# Patient Record
Sex: FEMALE | Race: WHITE | NOT HISPANIC OR LATINO | Employment: PART TIME | URBAN - METROPOLITAN AREA
[De-identification: names, ages, dates, MRNs, and addresses within clinical notes are randomized per-mention and may not be internally consistent; named-entity substitution may affect disease eponyms.]

---

## 2017-03-30 ENCOUNTER — GENERIC CONVERSION - ENCOUNTER (OUTPATIENT)
Dept: OTHER | Facility: OTHER | Age: 52
End: 2017-03-30

## 2017-08-15 ENCOUNTER — GENERIC CONVERSION - ENCOUNTER (OUTPATIENT)
Dept: OTHER | Facility: OTHER | Age: 52
End: 2017-08-15

## 2017-08-23 ENCOUNTER — ALLSCRIPTS OFFICE VISIT (OUTPATIENT)
Dept: OTHER | Facility: OTHER | Age: 52
End: 2017-08-23

## 2017-08-23 DIAGNOSIS — Z12.31 ENCOUNTER FOR SCREENING MAMMOGRAM FOR MALIGNANT NEOPLASM OF BREAST: ICD-10-CM

## 2017-08-23 DIAGNOSIS — R79.89 OTHER SPECIFIED ABNORMAL FINDINGS OF BLOOD CHEMISTRY: ICD-10-CM

## 2017-08-23 DIAGNOSIS — R32 URINARY INCONTINENCE: ICD-10-CM

## 2017-08-23 DIAGNOSIS — E03.9 HYPOTHYROIDISM: ICD-10-CM

## 2017-08-23 DIAGNOSIS — Z13.6 ENCOUNTER FOR SCREENING FOR CARDIOVASCULAR DISORDERS: ICD-10-CM

## 2017-09-05 ENCOUNTER — ALLSCRIPTS OFFICE VISIT (OUTPATIENT)
Dept: OTHER | Facility: OTHER | Age: 52
End: 2017-09-05

## 2017-09-05 PROCEDURE — G0145 SCR C/V CYTO,THINLAYER,RESCR: HCPCS | Performed by: NURSE PRACTITIONER

## 2017-09-05 PROCEDURE — 87624 HPV HI-RISK TYP POOLED RSLT: CPT | Performed by: NURSE PRACTITIONER

## 2017-09-06 ENCOUNTER — LAB REQUISITION (OUTPATIENT)
Dept: LAB | Facility: HOSPITAL | Age: 52
End: 2017-09-06
Payer: COMMERCIAL

## 2017-09-06 DIAGNOSIS — Z01.419 ENCOUNTER FOR GYNECOLOGICAL EXAMINATION WITHOUT ABNORMAL FINDING: ICD-10-CM

## 2017-09-08 LAB — HPV RRNA GENITAL QL NAA+PROBE: NORMAL

## 2017-09-12 LAB
LAB AP GYN PRIMARY INTERPRETATION: NORMAL
Lab: NORMAL

## 2017-09-13 ENCOUNTER — GENERIC CONVERSION - ENCOUNTER (OUTPATIENT)
Dept: OTHER | Facility: OTHER | Age: 52
End: 2017-09-13

## 2017-09-13 ENCOUNTER — LAB CONVERSION - ENCOUNTER (OUTPATIENT)
Dept: OTHER | Facility: OTHER | Age: 52
End: 2017-09-13

## 2017-09-13 LAB
25(OH)D3 SERPL-MCNC: 34 NG/ML (ref 30–100)
A/G RATIO (HISTORICAL): 1.7 (CALC) (ref 1–2.5)
ALBUMIN SERPL BCP-MCNC: 4.5 G/DL (ref 3.6–5.1)
ALP SERPL-CCNC: 116 U/L (ref 33–130)
ALT SERPL W P-5'-P-CCNC: 22 U/L (ref 6–29)
AST SERPL W P-5'-P-CCNC: 22 U/L (ref 10–35)
BILIRUB SERPL-MCNC: 0.6 MG/DL (ref 0.2–1.2)
BUN SERPL-MCNC: 14 MG/DL (ref 7–25)
BUN/CREA RATIO (HISTORICAL): NORMAL (CALC) (ref 6–22)
CALCIUM SERPL-MCNC: 10.2 MG/DL (ref 8.6–10.4)
CHLORIDE SERPL-SCNC: 103 MMOL/L (ref 98–110)
CO2 SERPL-SCNC: 31 MMOL/L (ref 20–31)
CREAT SERPL-MCNC: 0.76 MG/DL (ref 0.5–1.05)
EGFR AFRICAN AMERICAN (HISTORICAL): 105 ML/MIN/1.73M2
EGFR-AMERICAN CALC (HISTORICAL): 91 ML/MIN/1.73M2
GAMMA GLOBULIN (HISTORICAL): 2.7 G/DL (CALC) (ref 1.9–3.7)
GLUCOSE (HISTORICAL): 90 MG/DL (ref 65–99)
POTASSIUM SERPL-SCNC: 4.1 MMOL/L (ref 3.5–5.3)
SODIUM SERPL-SCNC: 139 MMOL/L (ref 135–146)
TOTAL PROTEIN (HISTORICAL): 7.2 G/DL (ref 6.1–8.1)

## 2017-09-14 ENCOUNTER — GENERIC CONVERSION - ENCOUNTER (OUTPATIENT)
Dept: OTHER | Facility: OTHER | Age: 52
End: 2017-09-14

## 2018-01-12 NOTE — RESULT NOTES
Discussion/Summary   Normal labs     Verified Results  (1) COMPREHENSIVE METABOLIC PANEL [id number] 91:04YY Jaime Brandon     Test Name Result Flag Reference   GLUCOSE 90 mg/dL  65-99   Fasting reference interval   UREA NITROGEN (BUN) 14 mg/dL  7-25   CREATININE 0 76 mg/dL  0 50-1 05   For patients >52years of age, the reference limit  for Creatinine is approximately 13% higher for people  identified as -American  eGFR NON-AFR  AMERICAN 91 mL/min/1 73m2  > OR = 60   eGFR AFRICAN AMERICAN 105 mL/min/1 73m2  > OR = 60   BUN/CREATININE RATIO   9-04   NOT APPLICABLE (calc)   SODIUM 139 mmol/L  135-146   POTASSIUM 4 1 mmol/L  3 5-5 3   CHLORIDE 103 mmol/L     CARBON DIOXIDE 31 mmol/L  20-31   CALCIUM 10 2 mg/dL  8 6-10 4   PROTEIN, TOTAL 7 2 g/dL  6 1-8 1   ALBUMIN 4 5 g/dL  3 6-5 1   GLOBULIN 2 7 g/dL (calc)  1 9-3 7   ALBUMIN/GLOBULIN RATIO 1 7 (calc)  1 0-2 5   BILIRUBIN, TOTAL 0 6 mg/dL  0 2-1 2   ALKALINE PHOSPHATASE 116 U/L     AST 22 U/L  10-35   ALT 22 U/L  6-29     (Q) CBC (H/H, RBC, INDICES, WBC, PLT) 12Sep2017 08:44AM Jade Everett     Test Name Result Flag Reference   WHITE BLOOD CELL COUNT 6 2 Thousand/uL  3 8-10 8   RED BLOOD CELL COUNT 4 53 Million/uL  3 80-5 10   HEMOGLOBIN 13 9 g/dL  11 7-15 5   HEMATOCRIT 42 0 %  35 0-45 0   MCV 92 7 fL  80 0-100 0   MCH 30 7 pg  27 0-33 0   MCHC 33 1 g/dL  32 0-36 0   RDW 12 5 %  11 0-15 0   PLATELET COUNT 098 Thousand/uL  140-400   WE RECEIVED YOUR HANDWRITTEN TEST ORDER AND  PERFORMED A HEMOGRAM WITH A PLATELET WITHOUT  A DIFFERENTIAL  IF THIS IS NOT WHAT YOU INTENDED  TO ORDER, PLEASE CONTACT YOUR LOCAL CLIENT SERVICE  REPRESENTATIVE IMMEDIATELY SO THAT WE CAN   ADJUST OUR BILLING APPROPRIATELY  YOU MAY ALSO   INQUIRE ABOUT ALTERNATIVE OR ADDITIONAL TESTING     MPV 10 2 fL  7 5-12 5           *(Q) VITAMIN D, 25-HYDROXY, LC/MS/MS 12Sep2017 08:44AM Jade Everett   REPORT COMMENT:  FASTING:YES     Test Name Result Flag Reference   VITAMIN D, 25-OH, TOTAL 34 ng/mL     Vitamin D Status         25-OH Vitamin D:     Deficiency:                    <20 ng/mL  Insufficiency:             20 - 29 ng/mL  Optimal:                 > or = 30 ng/mL     For 25-OH Vitamin D testing on patients on   D2-supplementation and patients for whom quantitation   of D2 and D3 fractions is required, the QuestAssureD(TM)  25-OH VIT D, (D2,D3), LC/MS/MS is recommended: order   code 61385 (patients >2yrs)  For more information on this test, go to:  http://education  [employer]/faq/YTY545  (This link is being provided for   informational/educational purposes only )

## 2018-01-13 VITALS
HEIGHT: 65 IN | OXYGEN SATURATION: 96 % | HEART RATE: 75 BPM | DIASTOLIC BLOOD PRESSURE: 74 MMHG | BODY MASS INDEX: 23.82 KG/M2 | WEIGHT: 143 LBS | SYSTOLIC BLOOD PRESSURE: 122 MMHG

## 2018-01-15 NOTE — RESULT NOTES
Verified Results  (1) THIN PREP PAP WITH IMAGING [id number] 11:32AM Mor Schofield     Test Name Result Flag Reference   LAB AP CASE REPORT (Report)     Gynecologic Cytology Report            Case: [id number]                  Authorizing Provider: UMM Payne   Collected:      09/05/2017           First Screen:     RANDI Infante    Received:      09/07/2017 6102        Specimen:  LIQUID-BASED PAP, SCREENING, Cervix   HPV HIGH RISK RESULT (Report)     HPV, High Risk: HPV NEG, HPV16 NEG, HPV18 NEG      Other High Risk HPV Negative, HPV 16 Negative, HPV 18 Negative  HPV types: 16,18,31,33,35,39,45,51,52,56,58,59,66 and 68 DNA are undetectable or below the pre-set threshold  [employer]???s FDA approved Leesa 4800 is utilized with strict adherence to the ???s instruction  manual to test for the presence of High-Risk HPV DNA, as well as HPV 16 and HPV 18  This instrument  has been validated by our laboratory and/or by the   A negative result does not preclude the presence of HPV infection because results depend on adequate  specimen collection, absence of inhibitors and sufficient DNA to be detected  Additionally, HPV negative  results are not intended to prevent women from proceeding to colposcopy if clinically warranted  Positive HPV test results indicate the presence of any one or more of the high risk types, but since patients  are often co-infected with low-risk types it does not rule out the presence of low-risk types in patients  with mixed infections  LAB AP GYN PRIMARY INTERPRETATION      Negative for intraepithelial lesion or malignancy  Electronically signed by RANDI Infante on 9/12/2017 at 11:32 AM   LAB AP GYN SPECIMEN ADEQUACY      Satisfactory for evaluation  Endocervical/transformation zone component present     LAB AP GYN ADDITIONAL INFORMATION (Report)     [employer]'s FDA approved ,  and ThinPrep Imaging System are   utilized with strict adherence to the 's instruction manual to   prepare gynecologic and non-gynecologic cytology specimens for the   production of ThinPrep slides as well as for gynecologic ThinPrep imaging  These processes have been validated by our laboratory and/or by the     The Pap test is not a diagnostic procedure and should not be used as the   sole means to detect cervical cancer  It is only a screening procedure to   aid in the detection of cervical cancer and its precursors  Both   false-negative and false-positive results have been experienced  Your   patient's test result should be interpreted in this context together with   the history and clinical findings

## 2018-01-16 NOTE — PROGRESS NOTES
Assessment    1  Annual physical exam (V70 0) (Z00 00)   2  Screening for colon cancer (V76 51) (Z12 11)   3  Hypothyroidism (244 9) (E03 9)   4  Encounter for screening for cardiovascular disorders (V81 2) (Z13 6)   5  Low serum vitamin D (790 6) (R79 89)   6  History of allergic rhinitis (V12 69) (Z87 09)   7  Allergic rhinitis (477 9) (J30 9)   8  Right foot pain (729 5) (M79 671)    Plan  Allergic rhinitis    · Montelukast Sodium 10 MG Oral Tablet (Singulair); 1 Tab daily  Annual physical exam    · Begin a limited exercise program ; Status:Complete;   Done: 17AFU8295   · Drink plenty of fluids ; Status:Complete;   Done: 21LLT6355   · Eat a low fat and low cholesterol diet ; Status:Complete;   Done: 47ZUE4209   · Eat foods that are high in calcium ; Status:Complete;   Done: 10URW7500   · Stretch and warm up your muscles during the first 10 minutes , then cool down your  muscles for the last 10 minutes of exercise ; Status:Complete;   Done: 08VBI6754   · We recommend a colonoscopy ; Status:Complete;   Done: 03WFH7995   · We recommend routine visits to a dentist ; Status:Complete;   Done: 35OEC7195   · We recommend that you follow these steps to lower your risk of osteoporosis  ;  Status:Complete;   Done: 04LRD2770   · We recommend that you get 400 IU of Vitamin D each day ; Status:Complete;   Done:  91HMC4020  Encounter for screening for cardiovascular disorders    · (1) COMPREHENSIVE METABOLIC PANEL; Status:Active; Requested for:75Bsh8454;    · (1) LIPID PANEL, FASTING; Status:Canceled;   Encounter for screening for cardiovascular disorders, Hypothyroidism    · (1) CBC/ PLT (NO DIFF); Status:Active; Requested for:69Jsl3300;    · (1) TSH; Status:Canceled; Low serum vitamin D, Right foot pain    · (1) VITAMIN D 25-HYDROXY; Status:Active; Requested for:75Val6779;   Screening for colon cancer    · COLONOSCOPY; Status:Active;  Requested for:60Sgd8073;    · *1 -  [facility]  * Status: Active   Requested for: 87Zuo7060  Care Summary provided  : Yes    Discussion/Summary  health maintenance visit Currently, she eats a healthy diet  cervical cancer screening is current Breast cancer screening: mammogram is current  Colorectal cancer screening: the risks and benefits of colorectal cancer screening were discussed and colonoscopy has been ordered  Screening lab work includes hemoglobin, glucose and 25-hydroxyvitamin D  The immunizations are up to date  She was advised to be evaluated by [facility] ophthalmologist regularly  Advice and education were given regarding nutrition, aerobic exercise, calcium supplements, vitamin D supplements and sunscreen use  Patient discussion: discussed with the patient  She has allergies which are not well controlled with nasal spray and her nasal mucosa is inflamed  And she has watery eyes  I will start her on Singulair and continue nasal spray  She will have her labs, TSH will be done through endocrinologist and lipid through her insurance  Her labs reviewed from last year which are normal    The patient was counseled regarding instructions for management, risk factor reductions, impressions, importance of compliance with treatment  Chief Complaint  NEW PT ESTABLSIH      History of Present Illness  HM, Adult Female: The patient is being seen for a health maintenance evaluation  Social History: Work status: working part-time and occupation: Pediatric nurse  The patient has never smoked cigarettes  She reports never drinking alcohol  She has never used illicit drugs  General Health:   Screening:   HPI: New patient physical  She follows endocrinologist Dr Bashir Thomas, for hypothyroidism  She has been evaluated from cardiologist for PVCs  And had a normal stress test and does not need any treatment    She has seasonal allergies which are now constant all year round, she has tried over-the-counter Claritin and Allegra and now she is using nasal spray and she still has itchy watery eyes and nasal congestion  She is up-to-date on mammogram and Pap smear   she follows gynecologist   She gets her lipid panel and blood sugar through her 's insurance, and she get her TSH checked from endocrinologist, and they are stable  She complains of bilateral feet pain on and off and in the past she saw a orthopedic and was diagnosed vitamin D deficiency and was treated with high-dose of vitamin D,  Currently she takes multivitamin for age 48 and above  And still has some on and off bilateral feet pain      Review of Systems    Constitutional: No fever, no chills, feels well, no tiredness, no recent weight gain or weight loss  Eyes: No complaints of eye pain, no red eyes, no eyesight problems, no discharge, no dry eyes, no itching of eyes  ENT: as noted in HPI  Cardiovascular: No complaints of slow heart rate, no fast heart rate, no chest pain, no palpitations, no leg claudication, no lower extremity edema  Respiratory: No complaints of shortness of breath, no wheezing, no cough, no SOB on exertion, no orthopnea, no PND  Gastrointestinal: No complaints of abdominal pain, no constipation, no nausea or vomiting, no diarrhea, no bloody stools  Genitourinary: No complaints of dysuria, no incontinence, no pelvic pain, no dysmenorrhea, no vaginal discharge or bleeding  Musculoskeletal: No complaints of arthralgias, no myalgias, no joint swelling or stiffness, no limb pain or swelling  Integumentary: No complaints of skin rash or lesions, no itching, no skin wounds, no breast pain or lump  Neurological: No complaints of headache, no confusion, no convulsions, no numbness, no dizziness or fainting, no tingling, no limb weakness, no difficulty walking  Psychiatric: Not suicidal, no sleep disturbance, no anxiety or depression, no change in personality, no emotional problems     Endocrine: No complaints of proptosis, no hot flashes, no muscle weakness, no deepening of the voice, no feelings of weakness  Hematologic/Lymphatic: No complaints of swollen glands, no swollen glands in the neck, does not bleed easily, does not bruise easily  ROS reviewed  Active Problems    1  Acute upper respiratory infection (465 9) (J06 9)   2  Dysuria (788 1) (R30 0)   3  Flank pain (789 09) (R10 9)   4  Hypothyroidism (244 9) (E03 9)   5  Irregular heart beats (427 9) (I49 9)   6  Joint pain, knee (719 46) (M25 569)   7  Microscopic hematuria (599 72) (R31 29)   8  Nontoxic Nodular Goiter (241 9)   9  Palpitations (785 1) (R00 2)   10  Premature atrial complex (427 61) (I49 1)   11  Premature ventricular contraction (427 69) (I49 3)   12  Screening for breast cancer (V76 10) (Z12 31)   13  Shortness of breath (786 05) (R06 02)   14  Tear Of Lateral Cartilage Of The Left Knee (836 1)   15  Vaginal burning (625 8) (N94 9)   16  Vertigo (780 4) (R42)   17  Visit for routine gyn exam (V72 31) (Z01 419)   18   Vulvar irritation (624 8) (N90 89)    Past Medical History    · History of Cervical neuritis (723 4) (M54 12)   · History of allergic rhinitis (V12 69) (Z87 09)   · History of Hashimoto thyroiditis (V12 29) (Z86 39)   · History of Vitamin D deficiency (268 9) (E55 9)    Surgical History    · History of Arthrotomy Of Knee With Open Meniscus Repair   · History of  Section    Family History  Mother    · Family history of Hyperlipidemia   · Family history of Hypertension (V17 49)   · Family history of Hyperthyroidism  Father    · Family history of acute myocardial infarction (V17 3) (Z80 55)  Son    · Family history of heart block (V17 49) (Z82 49)  Family History    · Family history of Arthritis (V17 7)   · Family history of Osteoporosis (V17 81)    Social History    · Daily Cola Consumption (___ Cans/Day)   · Daily Tea Consumption (1  Cups/Day)   · Does not drink alcohol (V49 89) (Z78 9)   · Does not exercise (V69 0) (Z72 3)   · Educational Level - Completed Bachelors Degree   · Marital History - Currently    · 2 children   · Never A Smoker   · Not currently sexually active   · 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   · Two children    Current Meds   1  Flonase Allergy Relief 50 MCG/ACT Nasal Suspension; 2 Sprays in each nostril daily at   night; Therapy: 12Kau8989 to Recorded   2  Synthroid 112 MCG Oral Tablet; Therapy: 98GHO0207 to (Last Rx:10Jun2010)  Requested for: 30WVR5286 Ordered   3  Viactiv Multi-Vitamin CHEW;   Therapy: (Recorded:02Ipu8984) to Recorded    Allergies    1  No Known Drug Allergies    Vitals   Recorded: 23Aug2017 09:42AM Recorded: 23Aug2017 09:08AM   Heart Rate 70 103   Respiration  16   Systolic  333   Diastolic  72   Height  5 ft 5 in   Weight  143 lb    BMI Calculated  23 8   BSA Calculated  1 72     Physical Exam    Constitutional   General appearance: No acute distress, well appearing and well nourished  Head and Face   Head and face: Normal     Palpation of the face and sinuses: No sinus tenderness  Eyes   Conjunctiva and lids: No swelling, erythema or discharge  Pupils and irises: Equal, round, reactive to light  Ears, Nose, Mouth, and Throat   External inspection of ears and nose: Normal     Otoscopic examination: Tympanic membranes translucent with normal light reflex  Canals patent without erythema  Hearing: Normal     Nasal mucosa, septum, and turbinates: Abnormal   The bilateral nasal mucosa was red  Lips, teeth, and gums: Normal, good dentition  Oropharynx: Normal with no erythema, edema, exudate or lesions  Neck   Neck: Supple, symmetric, trachea midline, no masses  Thyroid: Normal, no thyromegaly  Pulmonary   Respiratory effort: No increased work of breathing or signs of respiratory distress  Percussion of chest: Normal     Auscultation of lungs: Clear to auscultation  Cardiovascular   Auscultation of heart: Normal rate and rhythm, normal S1 and S2, no murmurs  Abdomen   Abdomen: Non-tender, no masses      Liver and spleen: No hepatomegaly or splenomegaly  Lymphatic   Palpation of lymph nodes in neck: No lymphadenopathy  Musculoskeletal   Gait and station: Normal     Digits and nails: Normal without clubbing or cyanosis  Joints, bones, and muscles: Normal     Range of motion: Normal     Stability: Normal     Muscle strength/tone: Normal     Skin   Skin and subcutaneous tissue: Normal without rashes or lesions  Palpation of skin and subcutaneous tissue: Normal turgor  Neurologic   Cranial nerves: Cranial nerves II-XII intact  Sensation: No sensory loss  Psychiatric   Judgment and insight: Normal     Orientation to person, place, and time: Normal     Recent and remote memory: Intact  Mood and affect: Normal        Results/Data  PHQ-2 Adult Depression Screening 90Qhc6444 09:12AM User, Ahs     Test Name Result Flag Reference   PHQ-2 Adult Depression Score 0     Over the last two weeks, how often have you been bothered by any of the following problems?   Little interest or pleasure in doing things: Not at all - 0  Feeling down, depressed, or hopeless: Not at all - 0   PHQ-2 Adult Depression Screening Negative         Procedure    Procedure:   Results: 20/20 in the right eye with corrective device, 20/20 in the left eye with corrective device SEES DR STEEN      Future Appointments    Date/Time Provider Specialty Site   09/05/2017 10:00 AM Haim Pinto, [address] [facility]  [facility]   Electronically signed by : ARIS Narayan ; Aug 23 2017  9:50AM EST                       (Author)

## 2018-01-22 VITALS
RESPIRATION RATE: 16 BRPM | HEIGHT: 65 IN | HEART RATE: 70 BPM | WEIGHT: 143 LBS | SYSTOLIC BLOOD PRESSURE: 122 MMHG | BODY MASS INDEX: 23.82 KG/M2 | DIASTOLIC BLOOD PRESSURE: 72 MMHG

## 2018-07-31 ENCOUNTER — OFFICE VISIT (OUTPATIENT)
Dept: OBGYN CLINIC | Facility: CLINIC | Age: 53
End: 2018-07-31
Payer: COMMERCIAL

## 2018-07-31 ENCOUNTER — APPOINTMENT (OUTPATIENT)
Dept: RADIOLOGY | Facility: CLINIC | Age: 53
End: 2018-07-31
Payer: COMMERCIAL

## 2018-07-31 VITALS
WEIGHT: 144 LBS | DIASTOLIC BLOOD PRESSURE: 67 MMHG | SYSTOLIC BLOOD PRESSURE: 127 MMHG | BODY MASS INDEX: 23.14 KG/M2 | HEART RATE: 76 BPM | HEIGHT: 66 IN

## 2018-07-31 DIAGNOSIS — M71.21 BAKER'S CYST OF KNEE, RIGHT: ICD-10-CM

## 2018-07-31 DIAGNOSIS — M17.11 PRIMARY LOCALIZED OSTEOARTHRITIS OF RIGHT KNEE: ICD-10-CM

## 2018-07-31 DIAGNOSIS — M25.561 RIGHT KNEE PAIN, UNSPECIFIED CHRONICITY: ICD-10-CM

## 2018-07-31 DIAGNOSIS — M25.561 RIGHT KNEE PAIN, UNSPECIFIED CHRONICITY: Primary | ICD-10-CM

## 2018-07-31 PROCEDURE — 73562 X-RAY EXAM OF KNEE 3: CPT

## 2018-07-31 PROCEDURE — 99204 OFFICE O/P NEW MOD 45 MIN: CPT | Performed by: ORTHOPAEDIC SURGERY

## 2018-07-31 RX ORDER — LEVOTHYROXINE SODIUM 88 MCG
TABLET ORAL
COMMUNITY
Start: 2018-07-27 | End: 2019-10-03

## 2018-07-31 RX ORDER — MELOXICAM 7.5 MG/1
7.5 TABLET ORAL DAILY
Qty: 30 TABLET | Refills: 1 | Status: SHIPPED | OUTPATIENT
Start: 2018-07-31 | End: 2018-10-04 | Stop reason: ALTCHOICE

## 2018-07-31 NOTE — PROGRESS NOTES
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 for a complaint of right knee fullness and stiffness  After thorough history, clinical exam, review of her x-rays I feel that she has localized patellofemoral osteoarthritis that is mild in her right knee  We discussed use of a daily anti-inflammatory as currently she is taking 3-4 doses of Motrin a day and this has helped her swelling since she noticed its onset  Today she does not have an effusion but she does have a small Baker cyst as well  We discussed the relationship between patellofemoral osteoarthritis and a Baker cyst   She does have some calf sharpness that is likely due to the radiation of pain from the Baker cyst as it is not associated with activity and does not raise clinical concern for a DVT at this point  She has no identifiable risk factors for DVT  To address her knee issues I recommended that she start taking a daily anti-inflammatory for Mobic, use a lateral J brace and modify her activities  If these are ineffective at relieving her discomfort we may consider adding physical therapy for her knee  All of her questions were addressed  Like to see her back in approximately 6 weeks time we will further delineate her plan of care from there  The patient may call the office at anytime if any questions or concerns should arise  Subjective:  Right knee pain    Patient ID: Fermín Valentine is a 47 y o  female  HPI  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 of approximately 1 weeks worth of anterior knee discomfort and tightness    She states that she got home from vacation in [place] last week and a few days towards the end of her vacation and after she got home she noticed a increase in right knee swelling and stiffness  She also had an intermittent sharp calf pain that could not be related to activity that was present after her return home  She has this calf pain that is intermittent as well as a fullness behind the knee  She states the pain can be 8/10 on the pain scale and is not affected by activity or rest   She states the frequency of this discomfort has been decreasing since she has got home  Her other complaint is mostly in her knee and she states that it has been swollen over the last few days and has been feeling tight  She states today is least that is been swollen and she is actually feeling slightly better  She denies any mechanical symptoms  She has been taking Motrin approximately 3 to 4 times a day to help with the swelling in her right knee  She did have a similar incident before in her left knee which underwent meniscectomy and a Baker cyst was also present during that time  This was managed by Dr Kong Chand 5 years ago  Review of Systems   Constitutional: Positive for activity change  HENT: Negative  Eyes: Negative  Respiratory: Negative  Cardiovascular: Negative  Gastrointestinal: Negative  Endocrine: Negative  Musculoskeletal: Positive for arthralgias  Skin: Negative  Neurological: Negative  Psychiatric/Behavioral: Negative  Past Medical History:   Diagnosis Date    Disease of thyroid gland     Primary localized osteoarthritis of right knee 2018       Past Surgical History:   Procedure Laterality Date     SECTION      KNEE SURGERY         Family History   Problem Relation Age of Onset    Hypertension Mother     Hyperlipidemia Mother     Osteoarthritis Mother     Thyroid disease Mother     Heart attack Father        Social History     Occupational History    Not on file       Social History Main Topics  Smoking status: Never Smoker    Smokeless tobacco: Never Used    Alcohol use No    Drug use: No    Sexual activity: Not on file         Current Outpatient Prescriptions:     Fluticasone Propionate (FLONASE NA), into each nostril, Disp: , Rfl:     Multiple Vitamins-Calcium (VIACTIV MULTI-VITAMIN) CHEW, Chew, Disp: , Rfl:     SYNTHROID 88 MCG tablet, , Disp: , Rfl:     meloxicam (MOBIC) 7 5 mg tablet, Take 1 tablet (7 5 mg total) by mouth daily, Disp: 30 tablet, Rfl: 1    No Known Allergies    Objective:  Vitals:    07/31/18 1412   BP: 127/67   Pulse: 76       Body mass index is 23 24 kg/m²  Right Knee Exam     Tenderness   The patient is experiencing tenderness in the medial retinaculum, patella and lateral retinaculum (Tenderness palpation in lateral facet of the patella)  Range of Motion   Extension: 0   Flexion: 130     Tests   Sandy:  Medial - negative Lateral - negative  Drawer:       Anterior - negative      Varus: negative  Valgus: negative  Patellar Apprehension: negative    Other   Erythema: absent  Sensation: normal  Pulse: present  Swelling: none  Other tests: no effusion present    Comments:  Positive patellofemoral grind  No erythema effusion or warmth associated with the knee  Small posterior Baker cyst palpable  No calf pain on compression  Negative Brendan sign  Knee is stable ligamentous exam with mild parapatellar crepitance          Observations     Right Knee   Negative for effusion  Physical Exam   Constitutional: She is oriented to person, place, and time  She appears well-developed  HENT:   Head: Atraumatic  Eyes: EOM are normal    Neck: Neck supple  Cardiovascular: Normal rate  Pulmonary/Chest: Effort normal    Musculoskeletal:        Right knee: She exhibits no effusion  See orthopedic exam   Neurological: She is alert and oriented to person, place, and time  Skin: Skin is warm and dry  Psychiatric: She has a normal mood and affect     Nursing note and vitals reviewed  I have personally reviewed pertinent films in PACS  X-ray of the right knee obtained here today demonstrates mild patellofemoral osteoarthritis with slight lateral joint space asymmetry and sclerosis  Tibial femoral joint spaces are well maintained  No significant osteophytes present at this point  No lytic or blastic lesions  Iron CURTIS    Division of Adult Reconstruction  Department of [facility]

## 2018-08-01 ENCOUNTER — TELEPHONE (OUTPATIENT)
Dept: OBGYN CLINIC | Facility: CLINIC | Age: 53
End: 2018-08-01

## 2018-08-01 NOTE — TELEPHONE ENCOUNTER
Call pt to inform that Friday she can come in to be fitted for a brace for her right knee  She complied

## 2018-08-13 ENCOUNTER — TELEPHONE (OUTPATIENT)
Dept: OBGYN CLINIC | Facility: HOSPITAL | Age: 53
End: 2018-08-13

## 2018-08-13 DIAGNOSIS — M71.21 BAKER'S CYST OF KNEE, RIGHT: ICD-10-CM

## 2018-08-13 DIAGNOSIS — M25.561 RIGHT KNEE PAIN, UNSPECIFIED CHRONICITY: ICD-10-CM

## 2018-08-13 DIAGNOSIS — M17.11 PRIMARY LOCALIZED OSTEOARTHRITIS OF RIGHT KNEE: Primary | ICD-10-CM

## 2018-08-13 NOTE — TELEPHONE ENCOUNTER
Patient sees Dr Mike Zimmer   [phone]    Patient is calling to let you know that she is still having pain in her right knee  Patient saw you on 7/31/18  Patient was told to let you know if it was not feeling better so that you could order further testing  Patient states that the sharp calf pain is gone but the knee pain is worse  She is stating that she cannot fully extend the knee & it feels weak, above & below  Patient feels that it won't support her  Patient also states that she has numbness in her foot  Please advise

## 2018-08-13 NOTE — TELEPHONE ENCOUNTER
MRI ordered  She can follow up in the office after it's completed  If it is not approved, she will have to do PT and wear the brace before we resubmit, or we can try a cortisone injection in the office

## 2018-08-13 NOTE — TELEPHONE ENCOUNTER
The next step for her is formal physical therapy to help with her patellofemoral arthritis and symptoms  She should continue with the knee brace while active and the anti-inflammatories as well, as it often takes a few weeks for these treatments to be effective  Physical therapy will help the quadriceps function in unison, which will help take stress off of the knee  She can set up an appointment at her convenience and we will see her back as planned

## 2018-08-13 NOTE — TELEPHONE ENCOUNTER
Called patient to advise, she stated that she believes she has torn her meniscus and doesn't want to start PT until she is sure there is no further damage, possibly going for an MRI  Sabaelsukumar Vasquez also stated she has not been using her knee brace instead she has been wrapping the knee in ace bandage as the brace was putting too much pressure on her calf  She does have a appointment scheduled for 9/11/18, please advise if she should schedule a closer appointment

## 2018-08-16 ENCOUNTER — HOSPITAL ENCOUNTER (OUTPATIENT)
Dept: MRI IMAGING | Facility: HOSPITAL | Age: 53
Discharge: HOME | End: 2018-08-16
Payer: COMMERCIAL

## 2018-08-16 DIAGNOSIS — M25.561 RIGHT KNEE PAIN, UNSPECIFIED CHRONICITY: ICD-10-CM

## 2018-08-16 DIAGNOSIS — M71.21 BAKER'S CYST OF KNEE, RIGHT: ICD-10-CM

## 2018-08-16 PROCEDURE — 73721 MRI JNT OF LWR EXTRE W/O DYE: CPT

## 2018-08-20 ENCOUNTER — TELEPHONE (OUTPATIENT)
Dept: OBGYN CLINIC | Facility: CLINIC | Age: 53
End: 2018-08-20

## 2018-08-21 ENCOUNTER — OFFICE VISIT (OUTPATIENT)
Dept: OBGYN CLINIC | Facility: CLINIC | Age: 53
End: 2018-08-21
Payer: COMMERCIAL

## 2018-08-21 VITALS
HEIGHT: 66 IN | BODY MASS INDEX: 22.98 KG/M2 | WEIGHT: 143 LBS | HEART RATE: 76 BPM | SYSTOLIC BLOOD PRESSURE: 122 MMHG | DIASTOLIC BLOOD PRESSURE: 76 MMHG

## 2018-08-21 DIAGNOSIS — M17.11 PATELLOFEMORAL ARTHRITIS OF RIGHT KNEE: Primary | ICD-10-CM

## 2018-08-21 DIAGNOSIS — M25.561 CHRONIC PAIN OF RIGHT KNEE: ICD-10-CM

## 2018-08-21 DIAGNOSIS — G89.29 CHRONIC PAIN OF RIGHT KNEE: ICD-10-CM

## 2018-08-21 DIAGNOSIS — M17.11 PRIMARY LOCALIZED OSTEOARTHRITIS OF RIGHT KNEE: ICD-10-CM

## 2018-08-21 PROCEDURE — 99213 OFFICE O/P EST LOW 20 MIN: CPT | Performed by: ORTHOPAEDIC SURGERY

## 2018-08-21 RX ORDER — LEVOTHYROXINE SODIUM 0.1 MG/1
88 TABLET ORAL
COMMUNITY
Start: 2018-08-08 | End: 2020-10-12
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
## 2018-08-23 ENCOUNTER — EVALUATION (OUTPATIENT)
Dept: PHYSICAL THERAPY | Facility: CLINIC | Age: 53
End: 2018-08-23
Payer: COMMERCIAL

## 2018-08-23 DIAGNOSIS — M25.561 CHRONIC PAIN OF RIGHT KNEE: ICD-10-CM

## 2018-08-23 DIAGNOSIS — G89.29 CHRONIC PAIN OF RIGHT KNEE: ICD-10-CM

## 2018-08-23 DIAGNOSIS — M17.11 PRIMARY LOCALIZED OSTEOARTHRITIS OF RIGHT KNEE: ICD-10-CM

## 2018-08-23 DIAGNOSIS — M17.11 PATELLOFEMORAL ARTHRITIS OF RIGHT KNEE: Primary | ICD-10-CM

## 2018-08-23 PROCEDURE — G8990 OTHER PT/OT CURRENT STATUS: HCPCS | Performed by: PHYSICAL THERAPIST

## 2018-08-23 PROCEDURE — G8991 OTHER PT/OT GOAL STATUS: HCPCS | Performed by: PHYSICAL THERAPIST

## 2018-08-23 PROCEDURE — 97161 PT EVAL LOW COMPLEX 20 MIN: CPT | Performed by: PHYSICAL THERAPIST

## 2018-08-23 NOTE — PROGRESS NOTES
PT Evaluation     Today's date: 2018  Patient name: Libra Camilo  : 1965  MRN: [id number]  Referring provider: Matthew Salas  Dx:   Encounter Diagnosis     ICD-10-CM    1  Patellofemoral arthritis of right knee M17 11 Ambulatory referral to Physical Therapy   2  Primary localized osteoarthritis of right knee M17 11 Ambulatory referral to Physical Therapy   3  Chronic pain of right knee M25 561 Ambulatory referral to Physical Therapy    G89 29        Start Time: 0800  Stop Time: 0845  Total time in clinic (min): 45 minutes    Assessment  Impairments: abnormal muscle firing, abnormal or restricted ROM, abnormal movement, activity intolerance, impaired physical strength, lacks appropriate home exercise program, pain with function, weight-bearing intolerance and poor body mechanics    Assessment details: Libra Camilo is a 47 y o  female who presents to the clinic with signs and symptoms consistent with right patella femoral pain syndrome  The patient reports increased pain while seated, going up and down stairs, and with prolonged standing  The patient presents with the above listed impairments  She is highly motivated to return to her prior function and should benefit from skilled physical therapy  Thank you for the referral       Understanding of Dx/Px/POC: good   Prognosis: good    Goals  Impairment Goals:  1  Patient will decrease complaints of pain to 5/10 at worst in 3 weeks  2  Patient will increase LE MMT grossly to 4/5    Functional Goals:  1  Patient will be independent with HEP in 6 weeks  2  Patient will safely ascend/descend a flight of stairs in 6 weeks  3  Patient will sit for longer than 1 hour with pain complaints of 5/10 or less in 6 weeks  4  Patient will stand for longer than 1 hour with pain complaints of 5/10 or less in 6 weeks  5  Patient will return to gardening in 6 weeks  6    Patient will safely complete a squat and complete it with her exercise routine in 3 weeks  7  Patient will return to her walking exercise program in 6 weeks      Plan  Patient would benefit from: skilled physical therapy  Planned modality interventions: cryotherapy, TENS and thermotherapy: hydrocollator packs  Planned therapy interventions: abdominal trunk stabilization, activity modification, ADL training, balance/weight bearing training, flexibility, functional ROM exercises, gait training, graded activity, graded exercise, graded motor, home exercise program, work reintegration, transfer training, therapeutic training, therapeutic exercise, therapeutic activities, stretching, strengthening, patient education, postural training, neuromuscular re-education, muscle pump exercises, Fisher taping, massage, manual therapy, joint mobilization, body mechanics training and community reintegration  Frequency: 2x week  Duration in weeks: 6  Treatment plan discussed with: patient        Subjective Evaluation    History of Present Illness  Mechanism of injury: Patient reported having knee pain mid July with increased swelling  A week later patient noted some pain in her calf which has resolved  She still has complaints of right knee pain  X-ray was (+) for arthritis and bakers cyst   MRI displayed medial meniscal tear  Patient was then referred to skilled physical therapy    Patient notes some paresthesia into the right foot in both sitting and standing  Pain Location:  Generalized weakness right knee with some increased sensitivity medial patella   Occupation:  Pediatric Nurse   Prior Functional Limitations:  No prior limitations, gardened and walked 2-3 miles 3x per week     AGG:  Going up and down stairs, standing for > 30 minutes , sitting > 1 hour, squatting, gardening, walking > 1 mile  Ease:  Elevate, Ice, Rest   Patient Goals:  "I would love to get back a good range of motion and get back to my prior function and walking "    Pain  Current pain ratin  At best pain ratin  At worst pain ratin  Quality: throbbing ("Pressure and unsteady")    Patient Goals  Patient goal: I would love to get back a good range of motion and get back to my prior function and walking        Objective     Special Questions  Negative for night pain, disturbed sleep, bladder dysfunction, bowel dysfunction and saddle (S4) numbness    Tenderness     Right Knee   Tenderness in the medial patella  Neurological Testing     Sensation     Knee   Left Knee   Intact: light touch    Right Knee   Intact: light touch     Additional Neurological Details  Neurovascular Intact     Active Range of Motion   Left Knee   Flexion: 145 degrees   Extension: 0 degrees     Right Knee   Flexion: 142 degrees   Extension: 0 degrees     Additional Active Range of Motion Details  Unable to assess prone flexion ROM due to patient having pain while prone  Strength/Myotome Testing     Left Hip   Planes of Motion   Flexion: 4  Extension: 3  Abduction: 3+    Right Hip   Planes of Motion   Flexion: 4  Extension: 3  Abduction: 3+    Left Knee   Flexion: 4  Extension: 4+    Right Knee   Flexion: 4  Extension: 4-    Left Ankle/Foot   Dorsiflexion: 4+    Right Ankle/Foot   Dorsiflexion: 4+    Tests     Right Knee   Positive patellar compression  Negative lateral Sandy and medial Sandy         Flowsheet Rows      Most Recent Value   PT/OT G-Codes   Current Score  50   Projected Score  62   FOTO information reviewed  Yes   Assessment Type  Evaluation   G code set  Other PT/OT Primary   Other PT Primary Current Status ()  CK   Other PT Primary Goal Status ()  CH          Precautions:  Possible Meniscus Tear right knee     Daily Treatment Diary     Manuals             PROM             Mobs                                       Exercise Diary              Bike             Recumbent Bike             Quad Sets x10            SLR             SL Hip ABD             Clamshells             Hamstring Curls             Deondre Ray Mini-Squats             Terminal Knee Extension             Hamstring S             Prone Quad S                                                                                                                                               Modalities             CP PRN

## 2018-08-28 ENCOUNTER — OFFICE VISIT (OUTPATIENT)
Dept: PHYSICAL THERAPY | Facility: CLINIC | Age: 53
End: 2018-08-28
Payer: COMMERCIAL

## 2018-08-28 DIAGNOSIS — M17.11 PATELLOFEMORAL ARTHRITIS OF RIGHT KNEE: Primary | ICD-10-CM

## 2018-08-28 DIAGNOSIS — G89.29 CHRONIC PAIN OF RIGHT KNEE: ICD-10-CM

## 2018-08-28 DIAGNOSIS — M25.561 CHRONIC PAIN OF RIGHT KNEE: ICD-10-CM

## 2018-08-28 DIAGNOSIS — M17.11 PRIMARY LOCALIZED OSTEOARTHRITIS OF RIGHT KNEE: ICD-10-CM

## 2018-08-28 PROCEDURE — 97110 THERAPEUTIC EXERCISES: CPT | Performed by: PHYSICAL THERAPIST

## 2018-08-28 PROCEDURE — 97140 MANUAL THERAPY 1/> REGIONS: CPT | Performed by: PHYSICAL THERAPIST

## 2018-08-28 PROCEDURE — 97112 NEUROMUSCULAR REEDUCATION: CPT | Performed by: PHYSICAL THERAPIST

## 2018-08-28 NOTE — PROGRESS NOTES
Daily Note     Today's date: 2018  Patient name: Marlyn Karimi  : 1965  MRN: [id number]  Referring provider: Anabel Bartlett  Dx:   Encounter Diagnosis     ICD-10-CM    1  Patellofemoral arthritis of right knee M17 11    2  Primary localized osteoarthritis of right knee M17 11    3  Chronic pain of right knee M25 561     G89 29        Start Time: 0800  Stop Time: 0842  Total time in clinic (min): 42 minutes    Subjective:  "The pain is more in my kneecap than in the inside of the knee  Sitting is the worst for me and I have the most pain when I stand up "      Objective: See treatment diary below      Assessment: Tolerated treatment well  Patient would benefit from continued PT  Patient with fatigue during all exercises today  Patient needs increased control of her quadriceps and hip rotators  Patient with more focal pain underneath patella, indicative of PFPS  Patient will see increased progress and performance with continued strengthening of her R LE  Plan: Progress treatment as tolerated          Precautions:  Possible Meniscus Tear right knee     Daily Treatment Diary     Manuals            PROM  Hamstring S           Mobs  Patellar Mobs                                      Exercise Diary              Bike             Recumbent Bike             Quad Sets x10            SLR  2x10           SL Hip ABD  2x10           Clamshells  Red 3x10           Hamstring Curls             X-Walks  Red 6' x 2           Mini-Squats             Terminal Knee Extension             Hamstring S             Prone Quad S             Bridges   2x10           Lateral Step Down   2x10                                                                                                                   Modalities             CP PRN

## 2018-08-30 ENCOUNTER — OFFICE VISIT (OUTPATIENT)
Dept: PHYSICAL THERAPY | Facility: CLINIC | Age: 53
End: 2018-08-30
Payer: COMMERCIAL

## 2018-08-30 DIAGNOSIS — M17.11 PRIMARY LOCALIZED OSTEOARTHRITIS OF RIGHT KNEE: ICD-10-CM

## 2018-08-30 DIAGNOSIS — G89.29 CHRONIC PAIN OF RIGHT KNEE: ICD-10-CM

## 2018-08-30 DIAGNOSIS — M17.11 PATELLOFEMORAL ARTHRITIS OF RIGHT KNEE: Primary | ICD-10-CM

## 2018-08-30 DIAGNOSIS — M25.561 CHRONIC PAIN OF RIGHT KNEE: ICD-10-CM

## 2018-08-30 PROCEDURE — 97140 MANUAL THERAPY 1/> REGIONS: CPT | Performed by: PHYSICAL THERAPIST

## 2018-08-30 PROCEDURE — 97110 THERAPEUTIC EXERCISES: CPT | Performed by: PHYSICAL THERAPIST

## 2018-08-30 PROCEDURE — 97112 NEUROMUSCULAR REEDUCATION: CPT | Performed by: PHYSICAL THERAPIST

## 2018-08-30 NOTE — PROGRESS NOTES
Daily Note     Today's date: 2018  Patient name: Sonam Romero  : 1965  MRN: [id number]  Referring provider: Hanna Parra  Dx:   Encounter Diagnosis     ICD-10-CM    1  Patellofemoral arthritis of right knee M17 11    2  Primary localized osteoarthritis of right knee M17 11    3  Chronic pain of right knee M25 561     G89 29        Start Time: 0800  Stop Time: 0845  Total time in clinic (min): 45 minutes    Subjective:  "My pain is a 4 today  I still feel it the most going down the steps  It was pretty sore after last visit "      Objective: See treatment diary below      Assessment: Tolerated treatment well  Patient would benefit from continued PT  Patient with complaints of numbness in her foot during hamstring stretching  Patient was shown seated sciatic nerve glide  Patient had complaints of soreness after last session which could be attributed to muscle soreness  Patient's knee was taped today which should hopefully provide relief as patient still has complaints of pain when going down steps  Patient with increased crepitus during attempted recumbent bicycle which was stopped after 30 seconds  We will continue to help decrease patient's pain while increasing function  Plan: Potential discharge next visit  Progress treatment as tolerated          Precautions:  Possible Meniscus Tear right knee     Daily Treatment Diary     Manuals           PROM  Hamstring S Hamstring S / Calf S          Mobs  Patellar Mobs  Patellar Mobs           Taping    Fat Pad                       Exercise Diary              Bike             Recumbent Bike             Quad Sets x10  2x10          SLR  2x10 NP          SL Hip ABD  2x10 2x10          Clamshells  Red 3x10 Red 2x10          Hamstring Curls             X-Walks  Red 6' x 2 NP          Mini-Squats             Terminal Knee Extension             Hamstring S             Prone Quad S             Bridges   2x10 NP          Lateral Step Down   2x10 NP          Step Ups   x5                                                                                                     Modalities             CP PRN

## 2018-09-04 ENCOUNTER — OFFICE VISIT (OUTPATIENT)
Dept: PHYSICAL THERAPY | Facility: CLINIC | Age: 53
End: 2018-09-04
Payer: COMMERCIAL

## 2018-09-04 DIAGNOSIS — G89.29 CHRONIC PAIN OF RIGHT KNEE: ICD-10-CM

## 2018-09-04 DIAGNOSIS — M17.11 PRIMARY LOCALIZED OSTEOARTHRITIS OF RIGHT KNEE: ICD-10-CM

## 2018-09-04 DIAGNOSIS — M25.561 CHRONIC PAIN OF RIGHT KNEE: ICD-10-CM

## 2018-09-04 DIAGNOSIS — M17.11 PATELLOFEMORAL ARTHRITIS OF RIGHT KNEE: Primary | ICD-10-CM

## 2018-09-04 PROCEDURE — 97112 NEUROMUSCULAR REEDUCATION: CPT | Performed by: PHYSICAL THERAPIST

## 2018-09-04 PROCEDURE — 97140 MANUAL THERAPY 1/> REGIONS: CPT | Performed by: PHYSICAL THERAPIST

## 2018-09-04 PROCEDURE — 97110 THERAPEUTIC EXERCISES: CPT | Performed by: PHYSICAL THERAPIST

## 2018-09-04 NOTE — PROGRESS NOTES
Daily Note     Today's date: 2018  Patient name: Saray Soto  : 1965  MRN: [id number]  Referring provider: Monica Mo  Dx:   Encounter Diagnosis     ICD-10-CM    1  Patellofemoral arthritis of right knee M17 11    2  Primary localized osteoarthritis of right knee M17 11    3  Chronic pain of right knee M25 561     G89 29        Start Time: 845  Stop Time: 930  Total time in clinic (min): 45 minutes    Subjective:  "My knee feels good today  My foot still goes numb a decent amount when I'm either sitting or standing "      Objective: See treatment diary below      Assessment: Tolerated treatment well  Patient would benefit from continued PT  Patient with most pain complaints today medial aspect of the knee and joint line with biggest functional deficit being descending stairs  She continues to have most of her pain with prolonged weight bearing and prolonged sitting  Patient remains weak in her hip musculature which may be contributing to her symptoms  We will continue to increase PREs as able  Plan: Progress treatment as tolerated          Precautions:  Possible Meniscus Tear right knee     Daily Treatment Diary     Manuals          PROM  Hamstring S Hamstring S / Calf S Hamstring S / Calf S         Mobs  Patellar Mobs  Patellar Mobs  NP         Taping    Fat Pad NP                      Exercise Diary              Bike             Recumbent Bike             Quad Sets x10  2x10 3x10         SLR  2x10 NP NP         SL Hip ABD  2x10 2x10 3x10, 1#         Clamshells  Red 3x10 Red 2x10 Red 3x10         Hamstring Curls    Seated, red tband 3x10         X-Walks  Red 6' x 2 NP NP         Mini-Squats             Terminal Knee Extension             Hamstring S             Prone Quad S             Bridges   2x10 NP 3x10         Lateral Step Down   2x10 NP NP         Step Ups   x5 NP Modalities             CP PRN

## 2018-09-06 ENCOUNTER — APPOINTMENT (OUTPATIENT)
Dept: PHYSICAL THERAPY | Facility: CLINIC | Age: 53
End: 2018-09-06
Payer: COMMERCIAL

## 2018-09-07 ENCOUNTER — APPOINTMENT (OUTPATIENT)
Dept: PHYSICAL THERAPY | Facility: CLINIC | Age: 53
End: 2018-09-07
Payer: COMMERCIAL

## 2018-09-11 ENCOUNTER — OFFICE VISIT (OUTPATIENT)
Dept: PHYSICAL THERAPY | Facility: CLINIC | Age: 53
End: 2018-09-11
Payer: COMMERCIAL

## 2018-09-11 DIAGNOSIS — G89.29 CHRONIC PAIN OF RIGHT KNEE: ICD-10-CM

## 2018-09-11 DIAGNOSIS — M17.11 PRIMARY LOCALIZED OSTEOARTHRITIS OF RIGHT KNEE: ICD-10-CM

## 2018-09-11 DIAGNOSIS — M17.11 PATELLOFEMORAL ARTHRITIS OF RIGHT KNEE: Primary | ICD-10-CM

## 2018-09-11 DIAGNOSIS — M25.561 CHRONIC PAIN OF RIGHT KNEE: ICD-10-CM

## 2018-09-11 PROCEDURE — 97140 MANUAL THERAPY 1/> REGIONS: CPT | Performed by: PHYSICAL THERAPIST

## 2018-09-11 PROCEDURE — 97110 THERAPEUTIC EXERCISES: CPT | Performed by: PHYSICAL THERAPIST

## 2018-09-11 PROCEDURE — 97112 NEUROMUSCULAR REEDUCATION: CPT | Performed by: PHYSICAL THERAPIST

## 2018-09-11 NOTE — PROGRESS NOTES
Daily Note     Today's date: 2018  Patient name: Dell Hassan  : 1965  MRN: [id number]  Referring provider: Michael Healy  Dx:   Encounter Diagnosis     ICD-10-CM    1  Patellofemoral arthritis of right knee M17 11    2  Primary localized osteoarthritis of right knee M17 11    3  Chronic pain of right knee M25 561     G89 29        Start Time: 1015  Stop Time: 1100  Total time in clinic (min): 45 minutes    Subjective:  "My knee is actually feeling much better  I was able to stand at work and not have much pain  It's definitely getting better "      Objective: See treatment diary below      Assessment: Tolerated treatment well  Patient would benefit from continued PT  Patient tender to palpation at medial joint line with some possible pes anserinus tenderness  Patient did not report any pain during therapeutic exercise today  Some occasional complaints of tenderness underneath her kneecap  She should continue to do well with therapy  Plan: Progress treatment as tolerated          Precautions:  Possible Meniscus Tear right knee     Daily Treatment Diary     Manuals         PROM  Hamstring S Hamstring S / Calf S Hamstring S / Calf S Hamstring / Adductor         Mobs  Patellar Mobs  Patellar Mobs  NP Patellar Mobs         Taping    Fat Pad NP NP                     Exercise Diary              Bike             Recumbent Bike             Quad Sets x10  2x10 3x10 NP        SLR  2x10 NP NP 3x10        SL Hip ABD  2x10 2x10 3x10, 1# 1 5# 2x10        Clamshells  Red 3x10 Red 2x10 Red 3x10 Green 3x10        Hamstring Curls    Seated, red tband 3x10 NP        X-Walks  Red 6' x 2 NP NP NP        Mini-Squats             Terminal Knee Extension             Hamstring S             Prone Quad S             Bridges   2x10 NP 3x10 1x10; 2x10 on ball / 2x5 hamstring on ball        Lateral Step Down   2x10 NP NP         Step Ups   x5 NP Modalities             CP PRN

## 2018-09-13 ENCOUNTER — APPOINTMENT (OUTPATIENT)
Dept: PHYSICAL THERAPY | Facility: CLINIC | Age: 53
End: 2018-09-13
Payer: COMMERCIAL

## 2018-09-14 ENCOUNTER — OFFICE VISIT (OUTPATIENT)
Dept: PHYSICAL THERAPY | Facility: CLINIC | Age: 53
End: 2018-09-14
Payer: COMMERCIAL

## 2018-09-14 DIAGNOSIS — M17.11 PATELLOFEMORAL ARTHRITIS OF RIGHT KNEE: Primary | ICD-10-CM

## 2018-09-14 DIAGNOSIS — M17.11 PRIMARY LOCALIZED OSTEOARTHRITIS OF RIGHT KNEE: ICD-10-CM

## 2018-09-14 DIAGNOSIS — G89.29 CHRONIC PAIN OF RIGHT KNEE: ICD-10-CM

## 2018-09-14 DIAGNOSIS — M25.561 CHRONIC PAIN OF RIGHT KNEE: ICD-10-CM

## 2018-09-14 PROCEDURE — 97110 THERAPEUTIC EXERCISES: CPT | Performed by: PHYSICAL THERAPIST

## 2018-09-14 PROCEDURE — 97140 MANUAL THERAPY 1/> REGIONS: CPT | Performed by: PHYSICAL THERAPIST

## 2018-09-14 PROCEDURE — 97112 NEUROMUSCULAR REEDUCATION: CPT | Performed by: PHYSICAL THERAPIST

## 2018-09-14 NOTE — PROGRESS NOTES
Daily Note     Today's date: 2018  Patient name: Autumn Pinto  : 1965  MRN: [id number]  Referring provider: Gelacio Coyle  Dx:   Encounter Diagnosis     ICD-10-CM    1  Patellofemoral arthritis of right knee M17 11    2  Primary localized osteoarthritis of right knee M17 11    3  Chronic pain of right knee M25 561     G89 29        Start Time: 0850  Stop Time: 935  Total time in clinic (min): 45 minutes    Subjective:  "My knee is feeling really good  I don't have any pain today  I definitely think it's getting better "      Objective: See treatment diary below      Assessment: Tolerated treatment well  Patient would benefit from continued PT  Patient was able to tolerate increased PREs today without any increase in pain  She was able to achieve muscular fatigue s/p session today  Good form present with all exercises including leg press and x-walks  Patient will be re-evaluated at the end of next week  Plan: Progress treatment as tolerated          Precautions:  Possible Meniscus Tear right knee     Daily Treatment Diary     Manuals        PROM  Hamstring S Hamstring S / Calf S Hamstring S / Calf S Hamstring / Adductor  Hamstrings       Mobs  Patellar Mobs  Patellar Mobs  NP Patellar Mobs  NP       Taping    Fat Pad NP NP NP                    Exercise Diary              Bike             Recumbent Bike             Quad Sets x10  2x10 3x10 NP NP       SLR  2x10 NP NP 3x10 2# 2x10       SL Hip ABD  2x10 2x10 3x10, 1# 1 5# 2x10 2# 2x10       Clamshells  Red 3x10 Red 2x10 Red 3x10 Green 3x10 Green 3x10       Hamstring Curls    Seated, red tband 3x10 NP NP       X-Walks  Red 6' x 2 NP NP NP Yellow 20' x 1       Mini-Squats             Leg Press       30# 2x10       Terminal Knee Extension             Hamstring S             Prone Quad S             Bridges   2x10 NP 3x10 1x10; 2x10 on ball / 2x5 hamstring on ball 3x10 glutes / 3x10 hamstrings       Lateral Step Down   2x10 NP NP  NP       Step Ups   x5 NP  NP                                                                                                  Modalities             CP PRN

## 2018-09-18 ENCOUNTER — OFFICE VISIT (OUTPATIENT)
Dept: PHYSICAL THERAPY | Facility: CLINIC | Age: 53
End: 2018-09-18
Payer: COMMERCIAL

## 2018-09-18 DIAGNOSIS — M25.561 CHRONIC PAIN OF RIGHT KNEE: ICD-10-CM

## 2018-09-18 DIAGNOSIS — M17.11 PRIMARY LOCALIZED OSTEOARTHRITIS OF RIGHT KNEE: ICD-10-CM

## 2018-09-18 DIAGNOSIS — M17.11 PATELLOFEMORAL ARTHRITIS OF RIGHT KNEE: Primary | ICD-10-CM

## 2018-09-18 DIAGNOSIS — G89.29 CHRONIC PAIN OF RIGHT KNEE: ICD-10-CM

## 2018-09-18 PROCEDURE — 97140 MANUAL THERAPY 1/> REGIONS: CPT | Performed by: PHYSICAL THERAPIST

## 2018-09-18 PROCEDURE — 97110 THERAPEUTIC EXERCISES: CPT | Performed by: PHYSICAL THERAPIST

## 2018-09-18 PROCEDURE — 97112 NEUROMUSCULAR REEDUCATION: CPT | Performed by: PHYSICAL THERAPIST

## 2018-09-18 NOTE — PROGRESS NOTES
Daily Note     Today's date: 2018  Patient name: Merlinda Grimes  : 1965  MRN: [id number]  Referring provider: Rm Garcia  Dx:   Encounter Diagnosis     ICD-10-CM    1  Patellofemoral arthritis of right knee M17 11    2  Primary localized osteoarthritis of right knee M17 11    3  Chronic pain of right knee M25 561     G89 29        Start Time: 3512  Stop Time: 0742  Total time in clinic (min): 44 minutes    Subjective:  "I walked a mile over the weekend and it felt OK  It was a little achey the next day, but I took some motrin and it felt fine  The knee held up well over the weekend "      Objective: See treatment diary below      Assessment: Tolerated treatment well  Patient would benefit from continued PT  Patient's reps were increased today without any increased complaints of pain, just muscular fatigue  The patient was able to demonstrate proper step ups and step downs without any increase in pain  Patient reports general buildup of pressure behind the patella which may very well be stemming from increased arthritis of the patellafemoral joint  Patient will be re-evaluated next visit with a possible discharge  Plan: Potential discharge next visit  Progress treatment as tolerated          Precautions:  Possible Meniscus Tear right knee     Daily Treatment Diary     Manuals       PROM  Hamstring S Hamstring S / Calf S Hamstring S / Calf S Hamstring / Adductor  Hamstrings Hamstrings      Mobs  Patellar Mobs  Patellar Mobs  NP Patellar Mobs  NP NP      Taping    Fat Pad NP NP NP NP                   Exercise Diary              Bike             Recumbent Bike             Quad Sets x10  2x10 3x10 NP NP NP      SLR  2x10 NP NP 3x10 2# 2x10 2# 3x10      SL Hip ABD  2x10 2x10 3x10, 1# 1 5# 2x10 2# 2x10 2# 3x10      Clamshells  Red 3x10 Red 2x10 Red 3x10 Green 3x10 Green 3x10 Green 3x10      Hamstring Curls    Seated, red tband 3x10 NP NP Red 3x10 X-Walks  Red 6' x 2 NP NP NP Yellow 20' x 1 Yellow 20' x 1      Mini-Squats             Leg Press       30# 2x10 40# 2x10      IT Band S       10" x 10      Terminal Knee Extension             Hamstring S             Prone Quad S             Bridges   2x10 NP 3x10 1x10; 2x10 on ball / 2x5 hamstring on ball 3x10 glutes / 3x10 hamstrings NP      Lateral Step Down   2x10 NP NP  NP NP      Step Ups   x5 NP  NP x5                                                                                                 Modalities             CP PRN

## 2018-09-21 ENCOUNTER — EVALUATION (OUTPATIENT)
Dept: PHYSICAL THERAPY | Facility: CLINIC | Age: 53
End: 2018-09-21
Payer: COMMERCIAL

## 2018-09-21 DIAGNOSIS — M25.561 CHRONIC PAIN OF RIGHT KNEE: ICD-10-CM

## 2018-09-21 DIAGNOSIS — M17.11 PRIMARY LOCALIZED OSTEOARTHRITIS OF RIGHT KNEE: ICD-10-CM

## 2018-09-21 DIAGNOSIS — M17.11 PATELLOFEMORAL ARTHRITIS OF RIGHT KNEE: Primary | ICD-10-CM

## 2018-09-21 DIAGNOSIS — G89.29 CHRONIC PAIN OF RIGHT KNEE: ICD-10-CM

## 2018-09-21 PROCEDURE — 97112 NEUROMUSCULAR REEDUCATION: CPT | Performed by: PHYSICAL THERAPIST

## 2018-09-21 PROCEDURE — G8991 OTHER PT/OT GOAL STATUS: HCPCS | Performed by: PHYSICAL THERAPIST

## 2018-09-21 PROCEDURE — 97110 THERAPEUTIC EXERCISES: CPT | Performed by: PHYSICAL THERAPIST

## 2018-09-21 PROCEDURE — G8992 OTHER PT/OT  D/C STATUS: HCPCS | Performed by: PHYSICAL THERAPIST

## 2018-09-21 NOTE — PROGRESS NOTES
PT Discharge    Today's date: 2018  Patient name: Valery Perdue  : 1965  MRN: [id number]  Referring provider: Dinorah Cast  Dx:   Encounter Diagnosis     ICD-10-CM    1  Patellofemoral arthritis of right knee M17 11    2  Primary localized osteoarthritis of right knee M17 11    3  Chronic pain of right knee M25 561     G89 29        Start Time: 1018  Stop Time: 1100  Total time in clinic (min): 42 minutes    Assessment  Impairments: abnormal movement, pain with function and weight-bearing intolerance    Assessment details: Valery Perdue is a 47 y o  female who has been very consistent with attending and participating in skilled physical therapy  Prior the patient had significant difficulty ascending and descending steps as well as standing and sitting for prolonged periods  With physical therapy, the patient has been able to increase her lower extremity strength as well as increase her pain free ROM of her knee  She has noticed a significant increase in functional ability at home and she has been able to resume her walking program   Patient agreed that today would be her last day of therapy and was eager to continue with her HEP which she has shown independence with  At this time, the patient will discharge to home with her HEP  Understanding of Dx/Px/POC: good   Prognosis: good    Goals  Impairment Goals:  1  Patient will decrease complaints of pain to 5/10 at worst in 3 weeks - PARTIALLY MET   2  Patient will increase LE MMT grossly to 4/5 - MET     Functional Goals:  1  Patient will be independent with HEP in 6 weeks - MET   2  Patient will safely ascend/descend a flight of stairs in 6 weeks - MET   3  Patient will sit for longer than 1 hour with pain complaints of 5/10 or less in 6 weeks - MET   4  Patient will stand for longer than 1 hour with pain complaints of 5/10 or less in 6 weeks - MET   5  Patient will return to gardening in 6 weeks - MET   6    Patient will safely complete a squat and complete it with her exercise routine in 3 weeks - MET   7  Patient will return to her walking exercise program in 6 weeks - MET       Plan  Patient would benefit from: skilled physical therapy  Planned modality interventions: cryotherapy, TENS and thermotherapy: hydrocollator packs  Planned therapy interventions: activity modification, balance/weight bearing training, flexibility, functional ROM exercises, graded activity, graded exercise, work reintegration, therapeutic training, therapeutic exercise, therapeutic activities, patient education, neuromuscular re-education, community reintegration, home exercise program and graded motor  Treatment plan discussed with: patient        Subjective Evaluation    History of Present Illness  Mechanism of injury: Patient reports that she has improved "about 50% probably even more" since she has started physical therapy  She notes being able to go up and down stairs easier and that my "daily activities are much better"  She reports that her knee feels very stable now      Pain Location:  Generalized weakness right knee with some increased sensitivity medial patella   Occupation:  Pediatric Nurse   Prior Functional Limitations:  No prior limitations, gardened and walked 2-3 miles 3x per week     AGG:  Going up and down stairs, standing for > 30 minutes , sitting > 1 hour, squatting, gardening, walking > 1 mile  Ease:  Elevate, Ice, Rest   Patient Goals:  "I would love to get back a good range of motion and get back to my prior function and walking "    Pain  Current pain ratin  At best pain ratin  At worst pain ratin  Quality: pressure ("soreness")    Patient Goals  Patient goal: I would love to get back a good range of motion and get back to my prior function and walking        Objective     Special Questions  Negative for night pain, disturbed sleep, bladder dysfunction, bowel dysfunction and saddle (S4) numbness    Tenderness     Right Knee   Tenderness in the medial patella  Neurological Testing     Sensation     Knee   Left Knee   Intact: light touch    Right Knee   Intact: light touch     Additional Neurological Details  Neurovascular Intact     Active Range of Motion   Left Knee   Flexion: 145 degrees   Extension: 0 degrees     Right Knee   Flexion: 145 degrees   Extension: 0 degrees     Strength/Myotome Testing     Left Hip   Planes of Motion   Flexion: 5  Extension: 4  Abduction: 4    Right Hip   Planes of Motion   Flexion: 5  Extension: 4  Abduction: 4    Left Knee   Flexion: 4+  Extension: 5    Right Knee   Flexion: 4+  Extension: 5    Left Ankle/Foot   Dorsiflexion: 5    Right Ankle/Foot   Dorsiflexion: 5    Tests     Right Knee   Positive patellar compression  Negative lateral Sandy and medial Sandy         Flowsheet Rows      Most Recent Value   PT/OT G-Codes   Current Score  62   Projected Score  62   Assessment Type  Discharge   G code set  Other PT/OT Primary   Other PT Primary Goal Status ()  [address]   Other PT Primary Discharge Status ()  CJ          Precautions:  Possible Meniscus Tear right knee     Daily Treatment Diary     Manuals 8/23 8/28 8/30 9/4 9/11 9/14 9/18 9/21     PROM  Hamstring S Hamstring S / Calf S Hamstring S / Calf S Hamstring / Adductor  Hamstrings Hamstrings NP     Mobs  Patellar Mobs  Patellar Mobs  NP Patellar Mobs  NP NP NP     Taping    Fat Pad NP NP NP NP NP                  Exercise Diary              Bike             Recumbent Bike             Quad Sets x10  2x10 3x10 NP NP NP NP     SLR  2x10 NP NP 3x10 2# 2x10 2# 3x10 NP     SL Hip ABD  2x10 2x10 3x10, 1# 1 5# 2x10 2# 2x10 2# 3x10 NP     Clamshells  Red 3x10 Red 2x10 Red 3x10 Green 3x10 Green 3x10 Green 3x10 NP     Hamstring Curls    Seated, red tband 3x10 NP NP Red 3x10 NP     X-Walks  Red 6' x 2 NP NP NP Yellow 20' x 1 Yellow 20' x 1 NP     Mini-Squats             Leg Press       30# 2x10 40# 2x10 NP     IT Band S       10" x 10 NP Terminal Knee Extension             Hamstring S             Prone Quad S        NP     Bridges   2x10 NP 3x10 1x10; 2x10 on ball / 2x5 hamstring on ball 3x10 glutes / 3x10 hamstrings NP NP     Lateral Step Down   2x10 NP NP  NP NP NP     Step Ups   x5 NP  NP x5 NP                  Review of HEP        RAT     Review of FOTO / Re-Evaluation / Discharge         RAT                                                         Modalities             CP PRN

## 2018-10-03 NOTE — PROGRESS NOTES
Assessment/Plan:     Calcium 1200-1500mg + 600-1000 IU Vit D daily  Pap with HR HPV q 3 years-due 2020  Annual mammogram Colonoscopy-referral given  Monthly BSE  Exercise 150 minutes per week minimum  Kegels 20 times twice daily  Silicone based lubricant with sex  Rx given for estrace  Neg depression screen     Diagnoses and all orders for this visit:    Encntr for gyn exam (general) (routine) w abnormal findings    Vaginal atrophy  -     estradiol (ESTRACE) 0 1 mg/g vaginal cream; Use  1 gm twice weekly    Encounter for screening mammogram for breast cancer  -     Mammo screening bilateral w 3d & cad; Future    Health care maintenance  -     Ambulatory referral to Gastroenterology; Future              Subjective:      Patient ID: Tony Vasquez is a 47 y o  female  Tony Vasquez is a 47 y o  female who is here today for her annual visit  No health concerns  Right knee bakers cyst and torn mensicus around 8/18  Finished PT one week ago  No menses x 2 years  Exercising 3 times per week  Had stabilization of her thyroid medication  Tony Vasquez is not sexually active for 2 years with male partner/ of 26 years   plans on having brain surgery to lessen his nighttime seizures  He has difficulty remembering from one day to the next  She is leaving it up to him  Urinary symptoms have resolved  She realized it was positional and did not need to follow up with PF PT  She rocks side to side and front and back while sitting on the toilet post voiding and now has no leakage  [facility] [address] [place] [address] weekends  Normal pap with  Negative HR HPV 9/17  Needs colonoscopy- asking for referral  Recent normal mammogram          The following portions of the patient's history were reviewed and updated as appropriate: allergies, current medications, past family history, past medical history, past social history, past surgical history and problem list     Review of Systems   Constitutional: Negative  Negative for activity change, appetite change, chills, diaphoresis, fatigue, fever and unexpected weight change  HENT: Negative for congestion, dental problem, sneezing, sore throat and trouble swallowing  Eyes: Negative for visual disturbance  Respiratory: Negative for chest tightness and shortness of breath  Cardiovascular: Negative for chest pain and leg swelling  Gastrointestinal: Negative for abdominal pain, constipation, diarrhea, nausea and vomiting  Genitourinary: Negative for difficulty urinating, dyspareunia, dysuria, frequency, hematuria, menstrual problem, pelvic pain, urgency, vaginal bleeding, vaginal discharge and vaginal pain  Musculoskeletal: Negative for back pain and neck pain  Skin: Negative  Allergic/Immunologic: Negative  Neurological: Negative for weakness and headaches  Hematological: Negative for adenopathy  Psychiatric/Behavioral: Negative  Objective:      /62 (BP Location: Left arm, Patient Position: Sitting)   Pulse 72   Ht 5' 6" (1 676 m)   Wt 64 3 kg (141 lb 12 8 oz)   LMP  (LMP Unknown)   BMI 22 89 kg/m²          Physical Exam   Constitutional: She is oriented to person, place, and time  She appears well-developed and well-nourished  HENT:   Head: Normocephalic and atraumatic  Eyes: Right eye exhibits no discharge  Left eye exhibits no discharge  Neck: Normal range of motion  Neck supple  Cardiovascular: Normal rate, regular rhythm, normal heart sounds and intact distal pulses  Pulmonary/Chest: Effort normal and breath sounds normal    Abdominal: Soft  Genitourinary: Vagina normal and uterus normal  Rectal exam shows no external hemorrhoid  No breast swelling, tenderness, discharge or bleeding  No labial fusion  There is no rash, tenderness, lesion or injury on the right labia  There is no rash, tenderness, lesion or injury on the left labia  Cervix exhibits no motion tenderness, no discharge and no friability   Right adnexum displays no mass, no tenderness and no fullness  Left adnexum displays no mass, no tenderness and no fullness  No erythema, tenderness or bleeding in the vagina  No foreign body in the vagina  No signs of injury around the vagina  No vaginal discharge found  Genitourinary Comments: Vulvovaginal atrophy; slight discomfort with insertion of speculum and guarded during pelvic exam   Musculoskeletal: Normal range of motion  Lymphadenopathy:     She has no cervical adenopathy  Right: No inguinal adenopathy present  Left: No inguinal adenopathy present  Neurological: She is alert and oriented to person, place, and time  Skin: Skin is warm and dry  Psychiatric: She has a normal mood and affect  Nursing note and vitals reviewed

## 2018-10-04 ENCOUNTER — ANNUAL EXAM (OUTPATIENT)
Dept: GYNECOLOGY | Facility: CLINIC | Age: 53
End: 2018-10-04
Payer: COMMERCIAL

## 2018-10-04 VITALS
SYSTOLIC BLOOD PRESSURE: 104 MMHG | DIASTOLIC BLOOD PRESSURE: 62 MMHG | HEART RATE: 72 BPM | HEIGHT: 66 IN | WEIGHT: 141.8 LBS | BODY MASS INDEX: 22.79 KG/M2

## 2018-10-04 DIAGNOSIS — N95.2 VAGINAL ATROPHY: ICD-10-CM

## 2018-10-04 DIAGNOSIS — Z01.411 ENCNTR FOR GYN EXAM (GENERAL) (ROUTINE) W ABNORMAL FINDINGS: Primary | ICD-10-CM

## 2018-10-04 DIAGNOSIS — Z00.00 HEALTH CARE MAINTENANCE: ICD-10-CM

## 2018-10-04 DIAGNOSIS — Z12.31 ENCOUNTER FOR SCREENING MAMMOGRAM FOR BREAST CANCER: ICD-10-CM

## 2018-10-04 PROCEDURE — S0612 ANNUAL GYNECOLOGICAL EXAMINA: HCPCS | Performed by: NURSE PRACTITIONER

## 2018-10-04 RX ORDER — ESTRADIOL 0.1 MG/G
CREAM VAGINAL
Qty: 42.5 G | Refills: 1 | Status: SHIPPED | OUTPATIENT
Start: 2018-10-04 | End: 2019-10-03

## 2018-10-04 NOTE — PATIENT INSTRUCTIONS
Calcium 1200-1500mg + 600-1000 IU Vit D daily  Pap with HR HPV q 3 years-due 2020  Annual mammogram Colonoscopy-referral given  Monthly BSE  Exercise 150 minutes per week minimum  Kegels 20 times twice daily  Silicone based lubricant with sex  Rx given for estrace

## 2018-10-23 ENCOUNTER — OFFICE VISIT (OUTPATIENT)
Dept: OBGYN CLINIC | Facility: CLINIC | Age: 53
End: 2018-10-23
Payer: COMMERCIAL

## 2018-10-23 VITALS
SYSTOLIC BLOOD PRESSURE: 116 MMHG | HEIGHT: 66 IN | WEIGHT: 143 LBS | BODY MASS INDEX: 22.98 KG/M2 | DIASTOLIC BLOOD PRESSURE: 77 MMHG | HEART RATE: 76 BPM

## 2018-10-23 DIAGNOSIS — G89.29 CHRONIC PAIN OF RIGHT KNEE: Primary | ICD-10-CM

## 2018-10-23 DIAGNOSIS — M17.11 PRIMARY LOCALIZED OSTEOARTHRITIS OF RIGHT KNEE: ICD-10-CM

## 2018-10-23 DIAGNOSIS — M25.561 CHRONIC PAIN OF RIGHT KNEE: Primary | ICD-10-CM

## 2018-10-23 PROCEDURE — 99213 OFFICE O/P EST LOW 20 MIN: CPT | Performed by: ORTHOPAEDIC SURGERY

## 2018-10-23 RX ORDER — MELOXICAM 7.5 MG/1
7.5 TABLET ORAL DAILY
Qty: 30 TABLET | Refills: 1 | Status: SHIPPED | OUTPATIENT
Start: 2018-10-23 | End: 2019-10-03

## 2018-10-23 NOTE — PROGRESS NOTES
Assessment/Plan:  1  Chronic pain of right knee  meloxicam (MOBIC) 7 5 mg tablet   2  Primary localized osteoarthritis of right knee  meloxicam (MOBIC) 7 5 mg tablet     Patient is here for follow-up regarding her anterior knee pain  She states that overall her pain is improved however she still does have an intermittent popping over the anterior portion of the knee  On examination today this was reproduced and this is parapatellar crepitance  And I reassured her that this is a normal part of her arthritic process and that this is not a sign of injury  I recommended she continue her home exercise program, wearing her lateral J brace for activity, and I have also refilled her meloxicam prescription as she feels that this may have helped her in the past   We also discussed activity modifications and avoidance of activities that exacerbate forces across the patellofemoral joint  She demonstrates understanding of this  I will see her back on an as-needed basis at which point I would expect escalate care when the above conservative measures are no longer provide her comfort as she increases her overall activity level  All of her questions were addressed today  Subjective: Follow-up anterior right knee pain    Patient ID: Diana Marroquin is a 47 y o  female  HPI  Patient is here for follow-up regarding her activity related right knee pain  She states that physical therapy and wearing the brace has significantly improved her discomfort and she is currently without pain  She does report today that she has an intermittent anterior like knee pressure that [employer] in a crack and a pop over the anterior lateral aspect of her knee  She states that is not associated with pain  She states that she notices most at the end of a long day after which she did a significant amount of stairs  She denies any instability  She denies any swelling   She states overall her pain is improved she is here concerned that the pop and cracking over the anterior lateral portion of her knee is a sign of subsequent injury  Review of Systems   Constitutional: Positive for activity change  HENT: Negative  Eyes: Negative  Respiratory: Negative  Cardiovascular: Negative  Gastrointestinal: Negative  Endocrine: Negative  Musculoskeletal: Positive for arthralgias  Skin: Negative  Neurological: Negative  Psychiatric/Behavioral: Negative  Past Medical History:   Diagnosis Date    Disease of thyroid gland     Primary localized osteoarthritis of right knee 2018       Past Surgical History:   Procedure Laterality Date     SECTION      DILATION AND CURETTAGE OF UTERUS      KNEE SURGERY         Family History   Problem Relation Age of Onset    Hypertension Mother     Hyperlipidemia Mother     Osteoarthritis Mother     Thyroid disease Mother     Heart attack Father     No Known Problems Sister     No Known Problems Brother     No Known Problems Daughter     Heart block Son        Social History     Occupational History    Not on file  Social History Main Topics    Smoking status: Never Smoker    Smokeless tobacco: Never Used    Alcohol use No    Drug use: No    Sexual activity: No      Comment: not sexually active and acceptable to both         Current Outpatient Prescriptions:     estradiol (ESTRACE) 0 1 mg/g vaginal cream, Use  1 gm twice weekly, Disp: 42 5 g, Rfl: 1    Fluticasone Propionate (FLONASE NA), into each nostril, Disp: , Rfl:     levothyroxine 100 mcg tablet, , Disp: , Rfl:     Multiple Vitamins-Calcium (VIACTIV MULTI-VITAMIN) CHEW, Chew, Disp: , Rfl:     SYNTHROID 88 MCG tablet, , Disp: , Rfl:     meloxicam (MOBIC) 7 5 mg tablet, Take 1 tablet (7 5 mg total) by mouth daily, Disp: 30 tablet, Rfl: 1    No Known Allergies    Objective:  Vitals:    10/23/18 0855   BP: 116/77   Pulse: 76       Body mass index is 23 08 kg/m²      Right Knee Exam Tenderness   The patient is experiencing tenderness in the patella  Range of Motion   Extension: 0   Flexion: 130     Tests   Sandy:  Medial - negative Lateral - negative  Lachman:  Anterior - negative      Varus: negative  Valgus: negative  Patellar Apprehension: negative    Other   Erythema: absent  Scars: absent  Sensation: normal  Pulse: present  Swelling: none  Other tests: no effusion present    Comments:  Parapatellar crepitance on active and passive range of motion, positive patellofemoral grind  Observations     Right Knee   Negative for effusion  Physical Exam   Constitutional: She is oriented to person, place, and time  She appears well-developed  HENT:   Head: Atraumatic  Eyes: EOM are normal    Neck: Neck supple  Cardiovascular: Normal rate  Pulmonary/Chest: Effort normal    Musculoskeletal:        Right knee: She exhibits no effusion  See orthopedic exam   Neurological: She is alert and oriented to person, place, and time  Skin: Skin is warm and dry  Psychiatric: She has a normal mood and affect  Nursing note and vitals reviewed  Jolly CURTIS    Division of Adult Reconstruction  Department of [facility]

## 2019-09-18 DIAGNOSIS — Z12.31 ENCOUNTER FOR SCREENING MAMMOGRAM FOR BREAST CANCER: ICD-10-CM

## 2019-10-03 ENCOUNTER — OFFICE VISIT (OUTPATIENT)
Dept: FAMILY MEDICINE CLINIC | Facility: CLINIC | Age: 54
End: 2019-10-03
Payer: COMMERCIAL

## 2019-10-03 VITALS
BODY MASS INDEX: 23.3 KG/M2 | HEART RATE: 80 BPM | SYSTOLIC BLOOD PRESSURE: 120 MMHG | DIASTOLIC BLOOD PRESSURE: 66 MMHG | OXYGEN SATURATION: 98 % | RESPIRATION RATE: 16 BRPM | WEIGHT: 145 LBS | HEIGHT: 66 IN

## 2019-10-03 DIAGNOSIS — Z13.6 SCREENING FOR CARDIOVASCULAR CONDITION: ICD-10-CM

## 2019-10-03 DIAGNOSIS — Z13.1 SCREENING FOR DIABETES MELLITUS: ICD-10-CM

## 2019-10-03 DIAGNOSIS — E55.9 VITAMIN D DEFICIENCY: ICD-10-CM

## 2019-10-03 DIAGNOSIS — Z12.11 SCREEN FOR COLON CANCER: ICD-10-CM

## 2019-10-03 DIAGNOSIS — Z00.00 ANNUAL PHYSICAL EXAM: Primary | ICD-10-CM

## 2019-10-03 PROCEDURE — 99396 PREV VISIT EST AGE 40-64: CPT | Performed by: FAMILY MEDICINE

## 2019-10-03 NOTE — PROGRESS NOTES
ADULT ANNUAL [address]  [facility]    NAME: Kayley Conti  AGE: 49 y o  SEX: female  : 1965     DATE: 10/3/2019     Assessment and Plan:     Problem List Items Addressed This Visit        Other    Screening for cardiovascular condition - Primary    Relevant Orders    Lipid panel    CBC and Platelet    Comprehensive metabolic panel    Vitamin D deficiency    Relevant Orders    Vitamin D 25 hydroxy         She follows gynecologist, she is up-to-date on mammogram Pap smears, she will see ENT next week, she has seen rheumatologist for dry eyes and dry mouth, she was told she might have Sjogren's but she is not on any medication she tries to drink more fluids  She is up-to-date on tetanus vaccine    Immunizations and preventive care screenings were discussed with patient today  Appropriate education was printed on patient's after visit summary  Counseling:  Alcohol/drug use: discussed moderation in alcohol intake, the recommendations for healthy alcohol use, and avoidance of illicit drug use  Dental Health: discussed importance of regular tooth brushing, flossing, and dental visits  · Exercise: the importance of regular exercise/physical activity was discussed  Recommend exercise 3-5 times per week for at least 30 minutes  Chief Complaint:     Chief Complaint   Patient presents with    Physical Exam      History of Present Illness:     Adult Annual Physical   Patient here for a comprehensive physical exam  The patient reports problems - dry mouth , dry eyes , post nasal drip  Diet and Physical Activity  · Diet/Nutrition: well balanced diet  · Exercise: no formal exercise        Depression Screening  PHQ-9 Depression Screening    PHQ-9:    Frequency of the following problems over the past two weeks:       Little interest or pleasure in doing things:  0 - not at all  Feeling down, depressed, or hopeless:  0 - not at all  PHQ-2 Score:  0       General Health  · Sleep: sleeps well  · Hearing: normal - bilateral   · Vision: wears contacts  · Dental: regular dental visits  /GYN Health  · Patient is: postmenopausal  ·      Review of Systems:     Review of Systems   Constitutional: Negative for activity change, appetite change, chills, diaphoresis, fatigue, fever and unexpected weight change  HENT: Positive for congestion  Negative for dental problem, ear discharge, ear pain, facial swelling, hearing loss, mouth sores, nosebleeds, postnasal drip, rhinorrhea, sinus pressure, sinus pain, sneezing, sore throat, trouble swallowing and voice change  Eyes: Negative for photophobia, pain, discharge, redness and itching  Respiratory: Negative for cough, chest tightness, shortness of breath and wheezing  Cardiovascular: Negative for chest pain, palpitations and leg swelling  Gastrointestinal: Negative for abdominal distention, abdominal pain, blood in stool, constipation, diarrhea and nausea  Endocrine: Negative for cold intolerance, heat intolerance, polydipsia, polyphagia and polyuria  Genitourinary: Negative for dysuria, flank pain, frequency, hematuria and urgency  Musculoskeletal: Negative for arthralgias, back pain, myalgias and neck pain  Skin: Negative for color change and pallor  Allergic/Immunologic: Negative for environmental allergies and food allergies  Neurological: Negative for dizziness, weakness, light-headedness, numbness and headaches  Hematological: Negative for adenopathy  Does not bruise/bleed easily  Psychiatric/Behavioral: Negative for behavioral problems, sleep disturbance and suicidal ideas  The patient is not nervous/anxious         Past Medical History:     Past Medical History:   Diagnosis Date    Allergic rhinitis     Resolved 8/23/2017     Cervical neuritis     Disease of thyroid gland     Hashimoto's thyroiditis     Primary localized osteoarthritis of right knee 7/31/2018    Vitamin D deficiency       Past Surgical History:     Past Surgical History:   Procedure Laterality Date     SECTION      DILATION AND CURETTAGE OF UTERUS      KNEE SURGERY        Social History:     Social History     Socioeconomic History    Marital status: /Civil Union     Spouse name: Not on file    Number of children: 2    Years of education: Not on file    Highest education level:  Bachelor's degree (e g , BA, AB, BS)   Occupational History    Occupation: RN, weekends    Social Needs    Financial resource strain: Not on file    Food insecurity:     Worry: Not on file     Inability: Not on file   Tandem needs:     Medical: Not on file     Non-medical: Not on file   Tobacco Use    Smoking status: Never Smoker    Smokeless tobacco: Never Used   Substance and Sexual Activity    Alcohol use: No    Drug use: No    Sexual activity: Not Currently     Comment: not sexually active and acceptable to both   Lifestyle    Physical activity:     Days per week: Not on file     Minutes per session: Not on file    Stress: Not on file   Relationships    Social connections:     Talks on phone: Not on file     Gets together: Not on file     Attends Buddhist service: Not on file     Active member of club or organization: Not on file     Attends meetings of clubs or organizations: Not on file     Relationship status: Not on file    Intimate partner violence:     Fear of current or ex partner: Not on file     Emotionally abused: Not on file     Physically abused: Not on file     Forced sexual activity: Not on file   Other Topics Concern    Not on file   Social History Narrative    Daily cola consumption (__cans/day)    Daily tea consumption (1 cup/day)    Does not exercise     x25 years      Family History:     Family History   Problem Relation Age of Onset    Hypertension Mother     Hyperlipidemia Mother     Osteoarthritis Mother     Thyroid disease Mother    Kenneth Macias Hyperthyroidism Mother     Heart attack Father     No Known Problems Sister     No Known Problems Brother     No Known Problems Daughter     Heart block Son     Arthritis Family     Osteoporosis Family       Current Medications:     Current Outpatient Medications   Medication Sig Dispense Refill    Fluticasone Propionate (FLONASE NA) into each nostril      levothyroxine 100 mcg tablet       Multiple Vitamins-Calcium (VIACTIV MULTI-VITAMIN) CHEW Chew       No current facility-administered medications for this visit  Allergies:     No Known Allergies   Physical Exam:     /66   Pulse 80   Resp 16   Ht 5' 6" (1 676 m)   Wt 65 8 kg (145 lb)   LMP  (LMP Unknown)   SpO2 98%   BMI 23 40 kg/m²     Physical Exam   Constitutional: She is oriented to person, place, and time  She appears well-developed and well-nourished  HENT:   Head: Normocephalic and atraumatic  Right Ear: External ear normal    Left Ear: External ear normal    Nose: Nose normal    Mouth/Throat: Oropharynx is clear and moist  No oropharyngeal exudate  Eyes: Pupils are equal, round, and reactive to light  Conjunctivae and EOM are normal  Right eye exhibits no discharge  Left eye exhibits no discharge  No scleral icterus  Neck: Normal range of motion  Neck supple  No tracheal deviation present  No thyromegaly present  Cardiovascular: Normal rate, regular rhythm and normal heart sounds  No murmur heard  Pulmonary/Chest: Effort normal and breath sounds normal  No respiratory distress  She has no wheezes  She has no rales  Abdominal: Soft  Bowel sounds are normal  She exhibits no distension and no mass  There is no tenderness  There is no rebound  Musculoskeletal: Normal range of motion  She exhibits no edema  Lymphadenopathy:     She has no cervical adenopathy  Neurological: She is alert and oriented to person, place, and time  She has normal reflexes  No cranial nerve deficit  Skin: Skin is warm  No rash noted  No erythema  No pallor  Psychiatric: She has a normal mood and affect  Her behavior is normal  Judgment and thought content normal    Nursing note and vitals reviewed        Tarik Rodney MD  [place] [id number]

## 2019-10-03 NOTE — PATIENT INSTRUCTIONS

## 2019-10-10 ENCOUNTER — ANNUAL EXAM (OUTPATIENT)
Dept: OBGYN CLINIC | Facility: CLINIC | Age: 54
End: 2019-10-10
Payer: COMMERCIAL

## 2019-10-10 VITALS
SYSTOLIC BLOOD PRESSURE: 112 MMHG | BODY MASS INDEX: 23.15 KG/M2 | WEIGHT: 143.4 LBS | HEART RATE: 84 BPM | DIASTOLIC BLOOD PRESSURE: 78 MMHG

## 2019-10-10 DIAGNOSIS — Z12.31 ENCOUNTER FOR SCREENING MAMMOGRAM FOR BREAST CANCER: ICD-10-CM

## 2019-10-10 DIAGNOSIS — N95.2 VAGINAL ATROPHY: ICD-10-CM

## 2019-10-10 DIAGNOSIS — Z01.411 ENCNTR FOR GYN EXAM (GENERAL) (ROUTINE) W ABNORMAL FINDINGS: Primary | ICD-10-CM

## 2019-10-10 PROCEDURE — 99396 PREV VISIT EST AGE 40-64: CPT | Performed by: NURSE PRACTITIONER

## 2019-10-10 RX ORDER — ESTRADIOL 10 UG/1
INSERT VAGINAL
Qty: 18 TABLET | Refills: 0 | Status: SHIPPED | OUTPATIENT
Start: 2019-10-10 | End: 2020-10-12

## 2019-10-10 RX ORDER — ESTRADIOL 0.1 MG/G
CREAM VAGINAL
Qty: 42.5 G | Refills: 0 | Status: CANCELLED | OUTPATIENT
Start: 2019-10-10

## 2019-10-10 NOTE — PROGRESS NOTES
Assessment/Plan:     Calcium 1200-1500mg + 600-1000 IU Vit D daily  Exercise 150 minutes per week minimum including weight bearing exercises  Pap with high risk HPV Q 5 years-due 2023  Annual mammogram and monthly breast self exam recommended  Colonoscopy- due and scheduled  Kegels 20 times twice daily  Silicone based lubricant with sex  Vaginal moisturizers twice weekly as needed  Rx for yuvafen and given instruction for use  Negative depression screen  Diagnoses and all orders for this visit:    Encntr for gyn exam (general) (routine) w abnormal findings    Vaginal atrophy  -     estradiol (VAGIFEM, YUVAFEM) 10 MCG TABS vaginal tablet; Use one tablet intravaginally daily x 2 weeks then one tablet twice weekly    Encounter for screening mammogram for breast cancer  -     Mammo screening bilateral w 3d & cad; Future    Other orders  -     Cancel: estradiol (ESTRACE) 0 1 mg/g vaginal cream; Use 0 5 gm daily x 2 weeks then 0 5 gm twice weekly  -     Cancel: Ambulatory referral to Colorectal Surgery; Future              Subjective:      Patient ID: Мария Landry is a 49 y o  female  Мария Landry is a 49 y o  female who is here today for her annual visit  Recently had an elevated TSH around 6 2 so her endocrinologist adjusted her medication  She feel better already  She discontinued the estrace due to feeling "wet down there  I'm no having sex " She was quite guarded with last years pelvic exam  LMP 3 years ago  Мария Landry is not sexually active with  of 27 years  He did not complete his brain surgery for epilepsy treatment because he lost his job  She did not like the idea of this surgery anyway  He is now employed again  Currently due for colonoscopy  Currently following with PCP for history of Vit D deficiency  Vit D 9/17 ws 34  Due for lab work currently  Working PT for [employer]  Normal pap with negative HR HPV 9/17  Normal mammogram 9/19  Does not currently exercise         The following portions of the patient's history were reviewed and updated as appropriate: allergies, current medications, past family history, past medical history, past social history, past surgical history and problem list     Review of Systems   Constitutional: Negative  Negative for activity change, appetite change, chills, diaphoresis, fatigue, fever and unexpected weight change  HENT: Negative for congestion, dental problem, sneezing, sore throat and trouble swallowing  Eyes: Negative for visual disturbance  Respiratory: Negative for chest tightness and shortness of breath  Cardiovascular: Negative for chest pain and leg swelling  Gastrointestinal: Negative for abdominal pain, constipation, diarrhea, nausea and vomiting  Genitourinary: Negative for difficulty urinating, dyspareunia, frequency, hematuria, pelvic pain, urgency, vaginal bleeding, vaginal discharge and vaginal pain  Musculoskeletal: Negative for back pain and neck pain  Skin: Negative  Allergic/Immunologic: Negative  Neurological: Negative for weakness and headaches  Hematological: Negative for adenopathy  Psychiatric/Behavioral: Negative  Objective:      /78 (BP Location: Left arm, Patient Position: Sitting, Cuff Size: Standard)   Pulse 84   Wt 65 kg (143 lb 6 4 oz)   LMP  (LMP Unknown)   BMI 23 15 kg/m²          Physical Exam   Constitutional: She is oriented to person, place, and time  Vital signs are normal  She appears well-developed and well-nourished  HENT:   Head: Normocephalic and atraumatic  Eyes: Right eye exhibits no discharge  Left eye exhibits no discharge  Neck: Trachea normal and normal range of motion  Neck supple  No thyromegaly present  Cardiovascular: Normal rate, regular rhythm, normal heart sounds and intact distal pulses     Pulmonary/Chest: Effort normal and breath sounds normal  Right breast exhibits no inverted nipple, no mass, no nipple discharge, no skin change and no tenderness  Left breast exhibits no inverted nipple, no mass, no nipple discharge, no skin change and no tenderness  No breast tenderness, discharge or bleeding  Breasts are symmetrical    Abdominal: Soft  Normal appearance  Genitourinary: Vagina normal and uterus normal  Rectal exam shows no external hemorrhoid  No breast tenderness, discharge or bleeding  Pelvic exam was performed with patient supine  No labial fusion  There is no rash, tenderness, lesion or injury on the right labia  There is no rash, tenderness, lesion or injury on the left labia  Cervix exhibits no motion tenderness, no discharge and no friability  Right adnexum displays no mass, no tenderness and no fullness  Left adnexum displays no mass, no tenderness and no fullness  No erythema, tenderness or bleeding in the vagina  No foreign body in the vagina  No signs of injury around the vagina  No vaginal discharge found  Genitourinary Comments: Vulvovaginal atrophy   Musculoskeletal: Normal range of motion  Lymphadenopathy:        Head (right side): No submental, no submandibular and no tonsillar adenopathy present  Head (left side): No submental, no submandibular and no tonsillar adenopathy present  She has no cervical adenopathy  She has no axillary adenopathy  No inguinal adenopathy noted on the right or left side  Right: No inguinal adenopathy present  Left: No inguinal adenopathy present  Neurological: She is alert and oriented to person, place, and time  Skin: Skin is warm and dry  Psychiatric: She has a normal mood and affect  Nursing note and vitals reviewed

## 2019-10-10 NOTE — PATIENT INSTRUCTIONS
Calcium 1200-1500mg + 600-1000 IU Vit D daily  Exercise 150 minutes per week minimum including weight bearing exercises  Pap with high risk HPV Q 5 years-due 2023  Annual mammogram and monthly breast self exam recommended  Colonoscopy- due and scheduled  Kegels 20 times twice daily  Silicone based lubricant with sex  Vaginal moisturizers twice weekly as needed  Rx for yuvafen and given instruction for use

## 2019-11-08 LAB
25(OH)D3 SERPL-MCNC: 31 NG/ML (ref 30–100)
ALBUMIN SERPL-MCNC: 4.5 G/DL (ref 3.6–5.1)
ALBUMIN/GLOB SERPL: 1.7 (CALC) (ref 1–2.5)
ALP SERPL-CCNC: 79 U/L (ref 33–130)
ALT SERPL-CCNC: 17 U/L (ref 6–29)
AST SERPL-CCNC: 19 U/L (ref 10–35)
BASOPHILS # BLD AUTO: 49 CELLS/UL (ref 0–200)
BASOPHILS NFR BLD AUTO: 0.8 %
BILIRUB SERPL-MCNC: 0.4 MG/DL (ref 0.2–1.2)
BUN SERPL-MCNC: 14 MG/DL (ref 7–25)
BUN/CREAT SERPL: NORMAL (CALC) (ref 6–22)
CALCIUM SERPL-MCNC: 9.9 MG/DL (ref 8.6–10.4)
CHLORIDE SERPL-SCNC: 104 MMOL/L (ref 98–110)
CHOLEST SERPL-MCNC: 206 MG/DL
CHOLEST/HDLC SERPL: 3.1 (CALC)
CO2 SERPL-SCNC: 31 MMOL/L (ref 20–32)
CREAT SERPL-MCNC: 0.9 MG/DL (ref 0.5–1.05)
EOSINOPHIL # BLD AUTO: 67 CELLS/UL (ref 15–500)
EOSINOPHIL NFR BLD AUTO: 1.1 %
ERYTHROCYTE [DISTWIDTH] IN BLOOD BY AUTOMATED COUNT: 12.1 % (ref 11–15)
GLOBULIN SER CALC-MCNC: 2.6 G/DL (CALC) (ref 1.9–3.7)
GLUCOSE SERPL-MCNC: 89 MG/DL (ref 65–99)
HBA1C MFR BLD: 5.4 % OF TOTAL HGB
HCT VFR BLD AUTO: 42.6 % (ref 35–45)
HDLC SERPL-MCNC: 67 MG/DL
HGB BLD-MCNC: 14 G/DL (ref 11.7–15.5)
LDLC SERPL CALC-MCNC: 123 MG/DL (CALC)
LYMPHOCYTES # BLD AUTO: 2312 CELLS/UL (ref 850–3900)
LYMPHOCYTES NFR BLD AUTO: 37.9 %
MCH RBC QN AUTO: 30.9 PG (ref 27–33)
MCHC RBC AUTO-ENTMCNC: 32.9 G/DL (ref 32–36)
MCV RBC AUTO: 94 FL (ref 80–100)
MONOCYTES # BLD AUTO: 433 CELLS/UL (ref 200–950)
MONOCYTES NFR BLD AUTO: 7.1 %
NEUTROPHILS # BLD AUTO: 3239 CELLS/UL (ref 1500–7800)
NEUTROPHILS NFR BLD AUTO: 53.1 %
NONHDLC SERPL-MCNC: 139 MG/DL (CALC)
PLATELET # BLD AUTO: 336 THOUSAND/UL (ref 140–400)
PMV BLD REES-ECKER: 10.2 FL (ref 7.5–12.5)
POTASSIUM SERPL-SCNC: 4.1 MMOL/L (ref 3.5–5.3)
PROT SERPL-MCNC: 7.1 G/DL (ref 6.1–8.1)
RBC # BLD AUTO: 4.53 MILLION/UL (ref 3.8–5.1)
SL AMB EGFR AFRICAN AMERICAN: 85 ML/MIN/1.73M2
SL AMB EGFR NON AFRICAN AMERICAN: 73 ML/MIN/1.73M2
SODIUM SERPL-SCNC: 140 MMOL/L (ref 135–146)
TRIGL SERPL-MCNC: 72 MG/DL
WBC # BLD AUTO: 6.1 THOUSAND/UL (ref 3.8–10.8)

## 2019-11-18 ENCOUNTER — OFFICE VISIT (OUTPATIENT)
Dept: FAMILY MEDICINE CLINIC | Facility: CLINIC | Age: 54
End: 2019-11-18
Payer: COMMERCIAL

## 2019-11-18 ENCOUNTER — TELEPHONE (OUTPATIENT)
Dept: OBGYN CLINIC | Facility: CLINIC | Age: 54
End: 2019-11-18

## 2019-11-18 ENCOUNTER — APPOINTMENT (OUTPATIENT)
Dept: LAB | Facility: CLINIC | Age: 54
End: 2019-11-18
Payer: COMMERCIAL

## 2019-11-18 VITALS
TEMPERATURE: 97.9 F | BODY MASS INDEX: 23.66 KG/M2 | DIASTOLIC BLOOD PRESSURE: 72 MMHG | HEIGHT: 66 IN | OXYGEN SATURATION: 98 % | WEIGHT: 147.2 LBS | RESPIRATION RATE: 16 BRPM | HEART RATE: 87 BPM | SYSTOLIC BLOOD PRESSURE: 120 MMHG

## 2019-11-18 DIAGNOSIS — R20.0 FACIAL NUMBNESS: ICD-10-CM

## 2019-11-18 DIAGNOSIS — R51.9 PERSISTENT HEADACHES: ICD-10-CM

## 2019-11-18 DIAGNOSIS — R51.9 PERSISTENT HEADACHES: Primary | ICD-10-CM

## 2019-11-18 DIAGNOSIS — R53.83 FATIGUE, UNSPECIFIED TYPE: ICD-10-CM

## 2019-11-18 LAB
BASOPHILS # BLD AUTO: 0.03 THOUSANDS/ΜL (ref 0–0.1)
BASOPHILS NFR BLD AUTO: 0 % (ref 0–1)
EOSINOPHIL # BLD AUTO: 0.09 THOUSAND/ΜL (ref 0–0.61)
EOSINOPHIL NFR BLD AUTO: 1 % (ref 0–6)
ERYTHROCYTE [DISTWIDTH] IN BLOOD BY AUTOMATED COUNT: 12.4 % (ref 11.6–15.1)
ERYTHROCYTE [SEDIMENTATION RATE] IN BLOOD: 9 MM/HOUR (ref 0–20)
HCT VFR BLD AUTO: 41 % (ref 34.8–46.1)
HGB BLD-MCNC: 13.5 G/DL (ref 11.5–15.4)
IMM GRANULOCYTES # BLD AUTO: 0.02 THOUSAND/UL (ref 0–0.2)
IMM GRANULOCYTES NFR BLD AUTO: 0 % (ref 0–2)
LYMPHOCYTES # BLD AUTO: 2.9 THOUSANDS/ΜL (ref 0.6–4.47)
LYMPHOCYTES NFR BLD AUTO: 38 % (ref 14–44)
MCH RBC QN AUTO: 31.4 PG (ref 26.8–34.3)
MCHC RBC AUTO-ENTMCNC: 32.9 G/DL (ref 31.4–37.4)
MCV RBC AUTO: 95 FL (ref 82–98)
MONOCYTES # BLD AUTO: 0.4 THOUSAND/ΜL (ref 0.17–1.22)
MONOCYTES NFR BLD AUTO: 5 % (ref 4–12)
NEUTROPHILS # BLD AUTO: 4.12 THOUSANDS/ΜL (ref 1.85–7.62)
NEUTS SEG NFR BLD AUTO: 56 % (ref 43–75)
NRBC BLD AUTO-RTO: 0 /100 WBCS
PLATELET # BLD AUTO: 326 THOUSANDS/UL (ref 149–390)
PMV BLD AUTO: 10.1 FL (ref 8.9–12.7)
RBC # BLD AUTO: 4.3 MILLION/UL (ref 3.81–5.12)
WBC # BLD AUTO: 7.56 THOUSAND/UL (ref 4.31–10.16)

## 2019-11-18 PROCEDURE — 36415 COLL VENOUS BLD VENIPUNCTURE: CPT

## 2019-11-18 PROCEDURE — 85025 COMPLETE CBC W/AUTO DIFF WBC: CPT

## 2019-11-18 PROCEDURE — 86618 LYME DISEASE ANTIBODY: CPT

## 2019-11-18 PROCEDURE — 85652 RBC SED RATE AUTOMATED: CPT

## 2019-11-18 PROCEDURE — 1036F TOBACCO NON-USER: CPT | Performed by: NURSE PRACTITIONER

## 2019-11-18 PROCEDURE — 99213 OFFICE O/P EST LOW 20 MIN: CPT | Performed by: NURSE PRACTITIONER

## 2019-11-18 RX ORDER — SODIUM, POTASSIUM,MAG SULFATES 17.5-3.13G
SOLUTION, RECONSTITUTED, ORAL ORAL
Refills: 0 | COMMUNITY
Start: 2019-10-23 | End: 2020-10-12

## 2019-11-18 NOTE — PROGRESS NOTES
Assessment/Plan:      Diagnoses and all orders for this visit:    Persistent headaches  -     Sedimentation rate, automated; Future  -     CBC and differential; Future  -     Lyme Antibody Profile with reflex to WB; Future  -     MRI brain wo contrast; Future  -     Ambulatory referral to Neurology; Future    Facial numbness  -     Sedimentation rate, automated; Future  -     CBC and differential; Future  -     Lyme Antibody Profile with reflex to WB; Future  -     MRI brain wo contrast; Future  -     Ambulatory referral to Neurology; Future    Fatigue, unspecified type  -     Sedimentation rate, automated; Future  -     CBC and differential; Future  -     Lyme Antibody Profile with reflex to WB; Future        Neuro exam wnl  Lab work and MRI ordered  Patient referred to neurology for further evaluation  Patient instructed to follow-up with her PCP, Dr Freedom Herrera in 2 weeks or sooner prn  Subjective:     Patient ID: Carli Gonzalez is a 49 y o  female  Patient reports occasional facial numbness in different areas of her face for the past 2 weeks  Patient reports that sometimes it occurs on the one side of the face and sometimes the other  Patient also reports occasional facial twitching that started about the same time and resolved about 1 week ago  Patient reports a HA for the past 8 days in different areas of her head  Patient reports that sometimes the HA occurs in her forehead area and sometimes the base of her skull  Patient reports that the HA is dull  Patient reports that she has woken up with the HA every morning for the past 8 days  Patient reports that motrin helps but the HA is still there  Patient reports that she was diagnosed with glaucoma last week; however she did not notice any vision changes  Patient denies any weakness or vomiting  Denies any chest pain or SOB  Patient reports that she follows up with rheumatology due to dryness and difficulty swallowing   Patient reports that she started the vagifem about a month ago  Review of Systems   Constitutional: Positive for fatigue  Negative for appetite change, chills and fever  HENT: Negative for congestion, ear pain and sore throat  Eyes: Negative for pain, discharge and redness  Respiratory: Negative for cough, shortness of breath and wheezing  Cardiovascular: Negative for chest pain  Gastrointestinal: Negative for abdominal pain, blood in stool, diarrhea and vomiting  Genitourinary: Negative for dysuria, frequency and hematuria  Skin: Negative for rash  Neurological: Positive for headaches  Negative for seizures and syncope  Objective:     Physical Exam   Constitutional: She is oriented to person, place, and time  No distress  HENT:   Right Ear: External ear normal    Left Ear: External ear normal    Nose: Nose normal    Mouth/Throat: Oropharynx is clear and moist    Tympanic membranes and ear canals wnl  Eyes: Pupils are equal, round, and reactive to light  Conjunctivae are normal    Cardiovascular: Normal rate, regular rhythm and normal heart sounds  Radial pulses +2 bilaterally  Pulmonary/Chest: Effort normal and breath sounds normal    Musculoskeletal:   Gait wnl  Lymphadenopathy:     She has no cervical adenopathy  Neurological: She is alert and oriented to person, place, and time  No cranial nerve deficit  Coordination normal    Patient reports a tingling sensation on her left cheek  No tenderness noted on palpation  Patient is able to feel my palpation  Skin: No rash noted  Psychiatric: She has a normal mood and affect  Vitals reviewed

## 2019-11-18 NOTE — TELEPHONE ENCOUNTER
Patient was prescribed Estradiol  She was seen by PCP today for headaches and some facial numbness  She was wondering if this could be from taking this medication  It has been going on for 8 days now

## 2019-11-19 LAB — B BURGDOR IGG+IGM SER-ACNC: <0.91 ISR (ref 0–0.9)

## 2019-11-21 ENCOUNTER — TELEPHONE (OUTPATIENT)
Dept: GASTROENTEROLOGY | Facility: MEDICAL CENTER | Age: 54
End: 2019-11-21

## 2019-11-25 ENCOUNTER — HOSPITAL ENCOUNTER (OUTPATIENT)
Dept: RADIOLOGY | Facility: HOSPITAL | Age: 54
Discharge: HOME | End: 2019-11-25
Payer: COMMERCIAL

## 2019-11-25 DIAGNOSIS — R20.0 FACIAL NUMBNESS: ICD-10-CM

## 2019-11-25 DIAGNOSIS — R51.9 PERSISTENT HEADACHES: ICD-10-CM

## 2019-11-25 PROCEDURE — 70551 MRI BRAIN STEM W/O DYE: CPT

## 2020-10-12 ENCOUNTER — ANNUAL EXAM (OUTPATIENT)
Dept: OBGYN CLINIC | Facility: CLINIC | Age: 55
End: 2020-10-12
Payer: COMMERCIAL

## 2020-10-12 VITALS
SYSTOLIC BLOOD PRESSURE: 110 MMHG | WEIGHT: 146.4 LBS | TEMPERATURE: 96.9 F | BODY MASS INDEX: 23.63 KG/M2 | DIASTOLIC BLOOD PRESSURE: 66 MMHG

## 2020-10-12 DIAGNOSIS — G89.29 CHRONIC NONINTRACTABLE HEADACHE, UNSPECIFIED HEADACHE TYPE: ICD-10-CM

## 2020-10-12 DIAGNOSIS — Z01.419 WELL WOMAN EXAM WITH ROUTINE GYNECOLOGICAL EXAM: Primary | ICD-10-CM

## 2020-10-12 DIAGNOSIS — R51.9 CHRONIC NONINTRACTABLE HEADACHE, UNSPECIFIED HEADACHE TYPE: ICD-10-CM

## 2020-10-12 DIAGNOSIS — Z12.31 ENCOUNTER FOR SCREENING MAMMOGRAM FOR MALIGNANT NEOPLASM OF BREAST: ICD-10-CM

## 2020-10-12 PROBLEM — Z13.6 SCREENING FOR CARDIOVASCULAR CONDITION: Status: RESOLVED | Noted: 2018-10-04 | Resolved: 2020-10-12

## 2020-10-12 PROBLEM — M25.561 RIGHT KNEE PAIN: Status: RESOLVED | Noted: 2018-07-31 | Resolved: 2020-10-12

## 2020-10-12 PROBLEM — Z01.411 ENCNTR FOR GYN EXAM (GENERAL) (ROUTINE) W ABNORMAL FINDINGS: Status: RESOLVED | Noted: 2018-10-04 | Resolved: 2020-10-12

## 2020-10-12 PROBLEM — R32 URINARY INCONTINENCE: Status: ACTIVE | Noted: 2017-09-05

## 2020-10-12 PROBLEM — E04.2 MULTINODULAR THYROID: Status: ACTIVE | Noted: 2020-10-04

## 2020-10-12 PROBLEM — E06.3 HASHIMOTO'S THYROIDITIS: Status: ACTIVE | Noted: 2020-10-12

## 2020-10-12 PROCEDURE — G0145 SCR C/V CYTO,THINLAYER,RESCR: HCPCS | Performed by: OBSTETRICS & GYNECOLOGY

## 2020-10-12 PROCEDURE — 87624 HPV HI-RISK TYP POOLED RSLT: CPT | Performed by: OBSTETRICS & GYNECOLOGY

## 2020-10-12 PROCEDURE — S0612 ANNUAL GYNECOLOGICAL EXAMINA: HCPCS | Performed by: OBSTETRICS & GYNECOLOGY

## 2020-10-12 RX ORDER — LEVOTHYROXINE SODIUM 88 UG/1
100 TABLET ORAL DAILY
COMMUNITY
Start: 2020-10-05 | End: 2021-05-27 | Stop reason: ALTCHOICE

## 2020-10-14 LAB
HPV HR 12 DNA CVX QL NAA+PROBE: NEGATIVE
HPV16 DNA CVX QL NAA+PROBE: NEGATIVE
HPV18 DNA CVX QL NAA+PROBE: NEGATIVE

## 2020-10-20 LAB
LAB AP GYN PRIMARY INTERPRETATION: NORMAL
Lab: NORMAL

## 2020-12-07 ENCOUNTER — HOSPITAL ENCOUNTER (OUTPATIENT)
Dept: MAMMOGRAPHY | Facility: HOSPITAL | Age: 55
Discharge: HOME | End: 2020-12-07
Attending: OBSTETRICS & GYNECOLOGY
Payer: COMMERCIAL

## 2020-12-07 VITALS — HEIGHT: 66 IN | BODY MASS INDEX: 23.46 KG/M2 | WEIGHT: 146 LBS

## 2020-12-07 DIAGNOSIS — Z12.31 ENCOUNTER FOR SCREENING MAMMOGRAM FOR MALIGNANT NEOPLASM OF BREAST: ICD-10-CM

## 2020-12-07 PROCEDURE — 77063 BREAST TOMOSYNTHESIS BI: CPT

## 2020-12-07 PROCEDURE — 77067 SCR MAMMO BI INCL CAD: CPT

## 2020-12-13 ENCOUNTER — NURSE TRIAGE (OUTPATIENT)
Dept: OTHER | Facility: OTHER | Age: 55
End: 2020-12-13

## 2020-12-13 DIAGNOSIS — Z20.828 SARS-ASSOCIATED CORONAVIRUS EXPOSURE: Primary | ICD-10-CM

## 2020-12-13 DIAGNOSIS — Z20.828 SARS-ASSOCIATED CORONAVIRUS EXPOSURE: ICD-10-CM

## 2020-12-13 PROBLEM — Z20.822 PERSON UNDER INVESTIGATION FOR COVID-19: Status: ACTIVE | Noted: 2020-12-13

## 2020-12-13 PROCEDURE — U0003 INFECTIOUS AGENT DETECTION BY NUCLEIC ACID (DNA OR RNA); SEVERE ACUTE RESPIRATORY SYNDROME CORONAVIRUS 2 (SARS-COV-2) (CORONAVIRUS DISEASE [COVID-19]), AMPLIFIED PROBE TECHNIQUE, MAKING USE OF HIGH THROUGHPUT TECHNOLOGIES AS DESCRIBED BY CMS-2020-01-R: HCPCS | Performed by: FAMILY MEDICINE

## 2020-12-14 ENCOUNTER — TELEMEDICINE (OUTPATIENT)
Dept: FAMILY MEDICINE CLINIC | Facility: CLINIC | Age: 55
End: 2020-12-14
Payer: COMMERCIAL

## 2020-12-14 DIAGNOSIS — U07.1 COVID-19 VIRUS INFECTION: Primary | ICD-10-CM

## 2020-12-14 LAB — SARS-COV-2 RNA SPEC QL NAA+PROBE: DETECTED

## 2020-12-14 PROCEDURE — 3725F SCREEN DEPRESSION PERFORMED: CPT | Performed by: FAMILY MEDICINE

## 2020-12-14 PROCEDURE — 99213 OFFICE O/P EST LOW 20 MIN: CPT | Performed by: FAMILY MEDICINE

## 2020-12-14 PROCEDURE — 1036F TOBACCO NON-USER: CPT | Performed by: FAMILY MEDICINE

## 2020-12-22 ENCOUNTER — TELEMEDICINE (OUTPATIENT)
Dept: FAMILY MEDICINE CLINIC | Facility: CLINIC | Age: 55
End: 2020-12-22
Payer: COMMERCIAL

## 2020-12-22 VITALS — TEMPERATURE: 97.4 F

## 2020-12-22 DIAGNOSIS — U07.1 COVID-19 VIRUS INFECTION: Primary | ICD-10-CM

## 2020-12-22 PROCEDURE — 99212 OFFICE O/P EST SF 10 MIN: CPT | Performed by: FAMILY MEDICINE

## 2020-12-26 ENCOUNTER — NURSE TRIAGE (OUTPATIENT)
Dept: OTHER | Facility: OTHER | Age: 55
End: 2020-12-26

## 2020-12-28 ENCOUNTER — OFFICE VISIT (OUTPATIENT)
Dept: URGENT CARE | Age: 55
End: 2020-12-28
Payer: COMMERCIAL

## 2020-12-28 ENCOUNTER — TELEMEDICINE (OUTPATIENT)
Dept: FAMILY MEDICINE CLINIC | Facility: CLINIC | Age: 55
End: 2020-12-28
Payer: COMMERCIAL

## 2020-12-28 VITALS
HEIGHT: 66 IN | BODY MASS INDEX: 22.82 KG/M2 | WEIGHT: 142 LBS | OXYGEN SATURATION: 97 % | RESPIRATION RATE: 18 BRPM | HEART RATE: 77 BPM | TEMPERATURE: 97.5 F

## 2020-12-28 VITALS — TEMPERATURE: 97.4 F | OXYGEN SATURATION: 98 % | SYSTOLIC BLOOD PRESSURE: 153 MMHG | DIASTOLIC BLOOD PRESSURE: 90 MMHG

## 2020-12-28 DIAGNOSIS — U07.1 COVID-19: ICD-10-CM

## 2020-12-28 DIAGNOSIS — R05.9 COUGH: Primary | ICD-10-CM

## 2020-12-28 DIAGNOSIS — U07.1 COVID-19 VIRUS INFECTION: Primary | ICD-10-CM

## 2020-12-28 PROCEDURE — 99213 OFFICE O/P EST LOW 20 MIN: CPT | Performed by: FAMILY MEDICINE

## 2020-12-28 PROCEDURE — 1036F TOBACCO NON-USER: CPT | Performed by: FAMILY MEDICINE

## 2020-12-28 PROCEDURE — 99213 OFFICE O/P EST LOW 20 MIN: CPT | Performed by: PHYSICIAN ASSISTANT

## 2020-12-28 RX ORDER — AZITHROMYCIN 250 MG/1
TABLET, FILM COATED ORAL
Qty: 6 TABLET | Refills: 0 | Status: SHIPPED | OUTPATIENT
Start: 2020-12-28 | End: 2021-01-01

## 2020-12-31 ENCOUNTER — TELEMEDICINE (OUTPATIENT)
Dept: FAMILY MEDICINE CLINIC | Facility: CLINIC | Age: 55
End: 2020-12-31
Payer: COMMERCIAL

## 2020-12-31 VITALS — BODY MASS INDEX: 22.82 KG/M2 | TEMPERATURE: 97.4 F | WEIGHT: 142 LBS | HEIGHT: 66 IN

## 2020-12-31 DIAGNOSIS — R03.0 ELEVATED BLOOD PRESSURE READING: ICD-10-CM

## 2020-12-31 DIAGNOSIS — U07.1 COVID-19 VIRUS INFECTION: Primary | ICD-10-CM

## 2020-12-31 PROCEDURE — 99213 OFFICE O/P EST LOW 20 MIN: CPT | Performed by: FAMILY MEDICINE

## 2020-12-31 PROCEDURE — 3008F BODY MASS INDEX DOCD: CPT | Performed by: FAMILY MEDICINE

## 2020-12-31 RX ORDER — MULTIVITAMIN
1 CAPSULE ORAL DAILY
COMMUNITY
End: 2022-04-11

## 2021-01-05 ENCOUNTER — TELEPHONE (OUTPATIENT)
Dept: FAMILY MEDICINE CLINIC | Facility: CLINIC | Age: 56
End: 2021-01-05

## 2021-01-05 NOTE — TELEPHONE ENCOUNTER
Patient called stated that doctor told her to call when she needed a note to return to work  She stated that her BP is normal and her cxray came back ok, so she is ready to go back to work  She needs a note to return this week  The note can be emailed to her

## 2021-01-30 ENCOUNTER — NURSE TRIAGE (OUTPATIENT)
Dept: OTHER | Facility: OTHER | Age: 56
End: 2021-01-30

## 2021-01-30 ENCOUNTER — APPOINTMENT (EMERGENCY)
Dept: CT IMAGING | Facility: HOSPITAL | Age: 56
End: 2021-01-30
Payer: COMMERCIAL

## 2021-01-30 ENCOUNTER — HOSPITAL ENCOUNTER (EMERGENCY)
Facility: HOSPITAL | Age: 56
Discharge: HOME | End: 2021-01-30
Attending: EMERGENCY MEDICINE
Payer: COMMERCIAL

## 2021-01-30 VITALS
HEART RATE: 80 BPM | SYSTOLIC BLOOD PRESSURE: 130 MMHG | WEIGHT: 145.94 LBS | BODY MASS INDEX: 23.56 KG/M2 | TEMPERATURE: 98.6 F | DIASTOLIC BLOOD PRESSURE: 70 MMHG | OXYGEN SATURATION: 100 % | RESPIRATION RATE: 18 BRPM

## 2021-01-30 DIAGNOSIS — R10.9 ABDOMINAL PAIN: Primary | ICD-10-CM

## 2021-01-30 DIAGNOSIS — K80.20 GALLSTONES: ICD-10-CM

## 2021-01-30 LAB
ALBUMIN SERPL BCP-MCNC: 3.9 G/DL (ref 3.5–5)
ALP SERPL-CCNC: 93 U/L (ref 46–116)
ALT SERPL W P-5'-P-CCNC: 31 U/L (ref 12–78)
ANION GAP SERPL CALCULATED.3IONS-SCNC: 9 MMOL/L (ref 4–13)
AST SERPL W P-5'-P-CCNC: 16 U/L (ref 5–45)
BASOPHILS # BLD AUTO: 0.04 THOUSANDS/ΜL (ref 0–0.1)
BASOPHILS NFR BLD AUTO: 0 % (ref 0–1)
BILIRUB SERPL-MCNC: 0.25 MG/DL (ref 0.2–1)
BILIRUB UR QL STRIP: NEGATIVE
BUN SERPL-MCNC: 18 MG/DL (ref 5–25)
CALCIUM SERPL-MCNC: 9.7 MG/DL (ref 8.3–10.1)
CHLORIDE SERPL-SCNC: 105 MMOL/L (ref 100–108)
CLARITY UR: CLEAR
CO2 SERPL-SCNC: 28 MMOL/L (ref 21–32)
COLOR UR: YELLOW
CREAT SERPL-MCNC: 0.77 MG/DL (ref 0.6–1.3)
EOSINOPHIL # BLD AUTO: 0.1 THOUSAND/ΜL (ref 0–0.61)
EOSINOPHIL NFR BLD AUTO: 1 % (ref 0–6)
ERYTHROCYTE [DISTWIDTH] IN BLOOD BY AUTOMATED COUNT: 12.1 % (ref 11.6–15.1)
GFR SERPL CREATININE-BSD FRML MDRD: 88 ML/MIN/1.73SQ M
GLUCOSE SERPL-MCNC: 92 MG/DL (ref 65–140)
GLUCOSE UR STRIP-MCNC: NEGATIVE MG/DL
HCT VFR BLD AUTO: 43.8 % (ref 34.8–46.1)
HGB BLD-MCNC: 14.3 G/DL (ref 11.5–15.4)
HGB UR QL STRIP.AUTO: NEGATIVE
IMM GRANULOCYTES # BLD AUTO: 0.02 THOUSAND/UL (ref 0–0.2)
IMM GRANULOCYTES NFR BLD AUTO: 0 % (ref 0–2)
KETONES UR STRIP-MCNC: NEGATIVE MG/DL
LEUKOCYTE ESTERASE UR QL STRIP: NEGATIVE
LIPASE SERPL-CCNC: 133 U/L (ref 73–393)
LYMPHOCYTES # BLD AUTO: 3.38 THOUSANDS/ΜL (ref 0.6–4.47)
LYMPHOCYTES NFR BLD AUTO: 38 % (ref 14–44)
MCH RBC QN AUTO: 30.9 PG (ref 26.8–34.3)
MCHC RBC AUTO-ENTMCNC: 32.6 G/DL (ref 31.4–37.4)
MCV RBC AUTO: 95 FL (ref 82–98)
MONOCYTES # BLD AUTO: 0.6 THOUSAND/ΜL (ref 0.17–1.22)
MONOCYTES NFR BLD AUTO: 7 % (ref 4–12)
NEUTROPHILS # BLD AUTO: 4.76 THOUSANDS/ΜL (ref 1.85–7.62)
NEUTS SEG NFR BLD AUTO: 54 % (ref 43–75)
NITRITE UR QL STRIP: NEGATIVE
NRBC BLD AUTO-RTO: 0 /100 WBCS
PH UR STRIP.AUTO: 7 [PH]
PLATELET # BLD AUTO: 341 THOUSANDS/UL (ref 149–390)
PMV BLD AUTO: 9.6 FL (ref 8.9–12.7)
POTASSIUM SERPL-SCNC: 3.8 MMOL/L (ref 3.5–5.3)
PROT SERPL-MCNC: 7.3 G/DL (ref 6.4–8.2)
PROT UR STRIP-MCNC: NEGATIVE MG/DL
RBC # BLD AUTO: 4.63 MILLION/UL (ref 3.81–5.12)
SODIUM SERPL-SCNC: 142 MMOL/L (ref 136–145)
SP GR UR STRIP.AUTO: <=1.005 (ref 1–1.03)
UROBILINOGEN UR QL STRIP.AUTO: 0.2 E.U./DL
WBC # BLD AUTO: 8.9 THOUSAND/UL (ref 4.31–10.16)

## 2021-01-30 PROCEDURE — 81003 URINALYSIS AUTO W/O SCOPE: CPT | Performed by: EMERGENCY MEDICINE

## 2021-01-30 PROCEDURE — 99284 EMERGENCY DEPT VISIT MOD MDM: CPT

## 2021-01-30 PROCEDURE — 36415 COLL VENOUS BLD VENIPUNCTURE: CPT | Performed by: EMERGENCY MEDICINE

## 2021-01-30 PROCEDURE — 83690 ASSAY OF LIPASE: CPT | Performed by: EMERGENCY MEDICINE

## 2021-01-30 PROCEDURE — 74177 CT ABD & PELVIS W/CONTRAST: CPT

## 2021-01-30 PROCEDURE — 99285 EMERGENCY DEPT VISIT HI MDM: CPT | Performed by: EMERGENCY MEDICINE

## 2021-01-30 PROCEDURE — 80053 COMPREHEN METABOLIC PANEL: CPT | Performed by: EMERGENCY MEDICINE

## 2021-01-30 PROCEDURE — 85025 COMPLETE CBC W/AUTO DIFF WBC: CPT | Performed by: EMERGENCY MEDICINE

## 2021-01-30 RX ORDER — ONDANSETRON 2 MG/ML
4 INJECTION INTRAMUSCULAR; INTRAVENOUS ONCE
Status: DISCONTINUED | OUTPATIENT
Start: 2021-01-30 | End: 2021-01-31 | Stop reason: HOSPADM

## 2021-01-30 RX ORDER — KETOROLAC TROMETHAMINE 30 MG/ML
15 INJECTION, SOLUTION INTRAMUSCULAR; INTRAVENOUS ONCE
Status: DISCONTINUED | OUTPATIENT
Start: 2021-01-30 | End: 2021-01-31 | Stop reason: HOSPADM

## 2021-01-30 RX ADMIN — IOHEXOL 100 ML: 350 INJECTION, SOLUTION INTRAVENOUS at 19:33

## 2021-01-30 NOTE — TELEPHONE ENCOUNTER
Regarding: Abdominal Pain  ----- Message from Leelee Sanders sent at 1/30/2021  1:21 PM EST -----  "I am having abdominal pain on the right side of my stomach and had loose bowel movements yesterday "

## 2021-01-30 NOTE — TELEPHONE ENCOUNTER
Called [facility], does not have ultrasound services  Attempted to reach [facility] but no answer x3  Directed patient to ED but she is hesitant to go relative to pain level  Reason for Disposition   [1] MILD-MODERATE pain AND [2] constant AND [3] present > 2 hours    Answer Assessment - Initial Assessment Questions  1  LOCATION: "Where does it hurt?"       RLQ  2  RADIATION: "Does the pain shoot anywhere else?" (e g , chest, back)      Denies  3  ONSET: "When did the pain begin?" (e g , minutes, hours or days ago)       Yesterday afternoon  4  SUDDEN: "Gradual or sudden onset?"      Gradual  5  PATTERN "Does the pain come and go, or is it constant?"     - If constant: "Is it getting better, staying the same, or worsening?"       (Note: Constant means the pain never goes away completely; most serious pain is constant and it progresses)      - If intermittent: "How long does it last?" "Do you have pain now?"      (Note: Intermittent means the pain goes away completely between bouts)      Constant  6  SEVERITY: "How bad is the pain?"  (e g , Scale 1-10; mild, moderate, or severe)    - MILD (1-3): doesn't interfere with normal activities, abdomen soft and not tender to touch     - MODERATE (4-7): interferes with normal activities or awakens from sleep, tender to touch     - SEVERE (8-10): excruciating pain, doubled over, unable to do any normal activities       4  7  RECURRENT SYMPTOM: "Have you ever had this type of abdominal pain before?" If so, ask: "When was the last time?" and "What happened that time?"       First time  8  CAUSE: "What do you think is causing the abdominal pain?"      Unknown  9  RELIEVING/AGGRAVATING FACTORS: "What makes it better or worse?" (e g , movement, antacids, bowel movement)      Notice more with sitting  10  OTHER SYMPTOMS: "Has there been any vomiting, diarrhea, constipation, or urine problems?"        Loose bowel movements yesterday  11   PREGNANCY: "Is there any chance you are pregnant?" "When was your last menstrual period?"        Denies; four years ago    Protocols used: ABDOMINAL PAIN - [facility]

## 2021-01-30 NOTE — ED PROVIDER NOTES
History  Chief Complaint   Patient presents with    Abdominal Pain     RLQ, described at pressure, x2 days  denies N/V/D, however states her stools have been loose     60-year-old female comes in for evaluation of right lower quadrant abdominal pain  Patient states that yesterday she noticed some right-sided abdominal pain and it persisted throughout the day today  Patient states the pain is worse with palpation and with movement  Patient denies any nausea vomiting or fever  Patient states that her stools have been looser than usual       History provided by:  Patient   used: No    Abdominal Pain  Pain location:  RLQ  Pain quality: pressure    Pain radiates to:  Does not radiate  Onset quality:  Gradual  Duration:  1 day  Timing:  Constant  Progression:  Worsening  Chronicity:  New  Context: not alcohol use, not recent illness and not trauma    Ineffective treatments:  None tried  Associated symptoms: no anorexia, no chest pain, no cough, no diarrhea, no fatigue, no fever, no hematuria, no shortness of breath and no vomiting    Risk factors: no alcohol abuse, no NSAID use and not pregnant        Prior to Admission Medications   Prescriptions Last Dose Informant Patient Reported? Taking?    Multiple Vitamin (multivitamin) capsule   Yes Yes   Sig: Take 1 capsule by mouth daily   levothyroxine 88 mcg tablet   Yes Yes   Sig: Take 100 mcg by mouth daily       Facility-Administered Medications: None       Past Medical History:   Diagnosis Date    Allergic rhinitis     Resolved 2017     Cervical neuritis     Disease of thyroid gland     Hashimoto's thyroiditis     Migraine 2020    Daily headaches    Miscarriage 1996    Primary localized osteoarthritis of right knee 2018    Varicella     As a child    Vitamin D deficiency        Past Surgical History:   Procedure Laterality Date     SECTION      DILATION AND CURETTAGE OF UTERUS  1996    KNEE SURGERY Family History   Problem Relation Age of Onset    Hypertension Mother     Hyperlipidemia Mother     Osteoarthritis Mother     Thyroid disease Mother     Hyperthyroidism Mother     Asthma Mother     Heart attack Father     Heart failure Father          from heart attack  at age 68   Jossy Christopher No Known Problems Sister     No Known Problems Brother     No Known Problems Daughter     Heart block Son     Arthritis Family     Osteoporosis Family      I have reviewed and agree with the history as documented  E-Cigarette/Vaping    E-Cigarette Use Never User      E-Cigarette/Vaping Substances    Nicotine No     THC No     CBD No     Flavoring No     Other No     Unknown No      Social History     Tobacco Use    Smoking status: Never Smoker    Smokeless tobacco: Never Used   Substance Use Topics    Alcohol use: No    Drug use: No       Review of Systems   Constitutional: Negative for fatigue and fever  HENT: Negative for congestion and ear pain  Eyes: Negative for discharge and redness  Respiratory: Negative for apnea, cough, shortness of breath and wheezing  Cardiovascular: Negative for chest pain  Gastrointestinal: Positive for abdominal pain  Negative for anorexia, diarrhea and vomiting  Endocrine: Negative for cold intolerance and polydipsia  Genitourinary: Negative for difficulty urinating and hematuria  Musculoskeletal: Negative for arthralgias and back pain  Skin: Negative for color change and rash  Allergic/Immunologic: Negative for environmental allergies and immunocompromised state  Neurological: Negative for numbness and headaches  Hematological: Negative for adenopathy  Does not bruise/bleed easily  Psychiatric/Behavioral: Negative for agitation and behavioral problems  Physical Exam  Physical Exam  Vitals signs and nursing note reviewed  Constitutional:       Appearance: Normal appearance  She is well-developed  She is not toxic-appearing  HENT:      Head: Normocephalic and atraumatic  Right Ear: Tympanic membrane and external ear normal       Left Ear: Tympanic membrane and external ear normal       Nose: Nose normal  No nasal deformity or rhinorrhea  Mouth/Throat:      Dentition: Normal dentition  Pharynx: Uvula midline  Eyes:      General: Lids are normal          Right eye: No discharge  Left eye: No discharge  Conjunctiva/sclera: Conjunctivae normal       Pupils: Pupils are equal, round, and reactive to light  Neck:      Musculoskeletal: Normal range of motion and neck supple  Vascular: No carotid bruit or JVD  Trachea: Trachea normal    Cardiovascular:      Rate and Rhythm: Normal rate and regular rhythm  No extrasystoles are present  Chest Wall: PMI is not displaced  Pulses: Normal pulses  Pulmonary:      Effort: Pulmonary effort is normal  No accessory muscle usage or respiratory distress  Breath sounds: Normal breath sounds  No wheezing, rhonchi or rales  Abdominal:      General: Bowel sounds are normal       Palpations: Abdomen is soft  Abdomen is not rigid  There is no mass  Tenderness: There is abdominal tenderness in the right lower quadrant  There is no guarding or rebound  Musculoskeletal:      Right shoulder: She exhibits normal range of motion, no bony tenderness, no swelling and no deformity  Cervical back: Normal  She exhibits normal range of motion, no tenderness, no bony tenderness and no deformity  Lymphadenopathy:      Cervical: No cervical adenopathy  Skin:     General: Skin is warm and dry  Findings: No rash  Neurological:      Mental Status: She is alert and oriented to person, place, and time  GCS: GCS eye subscore is 4  GCS verbal subscore is 5  GCS motor subscore is 6  Cranial Nerves: No cranial nerve deficit  Sensory: No sensory deficit  Deep Tendon Reflexes: Reflexes are normal and symmetric     Psychiatric: Speech: Speech normal          Behavior: Behavior normal          Vital Signs  ED Triage Vitals   Temperature Pulse Respirations Blood Pressure SpO2   01/30/21 1739 01/30/21 1739 01/30/21 1739 01/30/21 1739 01/30/21 1739   98 6 °F (37 °C) 91 18 (!) 189/97 98 %      Temp Source Heart Rate Source Patient Position - Orthostatic VS BP Location FiO2 (%)   01/30/21 1739 01/30/21 1739 01/30/21 1739 01/30/21 1739 --   Oral Monitor Sitting Right arm       Pain Score       01/30/21 1946       No Pain           Vitals:    01/30/21 1739 01/30/21 1946   BP: (!) 189/97 133/69   Pulse: 91 84   Patient Position - Orthostatic VS: Sitting Lying         Visual Acuity      ED Medications  Medications   ondansetron (ZOFRAN) injection 4 mg (4 mg Intravenous Not Given 1/30/21 1839)   ketorolac (TORADOL) injection 15 mg (15 mg Intravenous Not Given 1/30/21 1839)   iohexol (OMNIPAQUE) 350 MG/ML injection (MULTI-DOSE) 100 mL (100 mL Intravenous Given 1/30/21 1933)       Diagnostic Studies  Results Reviewed     Procedure Component Value Units Date/Time    UA w Reflex to Microscopic w Reflex to Culture [[id number]] Collected: 01/30/21 2052    Lab Status: Final result Specimen: Urine, Clean Catch Updated: 01/30/21 2103     Color, UA Yellow     Clarity, UA Clear     Specific Gravity, UA <=1 005     pH, UA 7 0     Leukocytes, UA Negative     Nitrite, UA Negative     Protein, UA Negative mg/dl      Glucose, UA Negative mg/dl      Ketones, UA Negative mg/dl      Urobilinogen, UA 0 2 E U /dl      Bilirubin, UA Negative     Blood, UA Negative    Comprehensive metabolic panel [[id number]] Collected: 01/30/21 1839    Lab Status: Final result Specimen: Blood from Arm, Right Updated: 01/30/21 1907     Sodium 142 mmol/L      Potassium 3 8 mmol/L      Chloride 105 mmol/L      CO2 28 mmol/L      ANION GAP 9 mmol/L      BUN 18 mg/dL      Creatinine 0 77 mg/dL      Glucose 92 mg/dL      Calcium 9 7 mg/dL      AST 16 U/L      ALT 31 U/L      Alkaline Phosphatase 93 U/L      Total Protein 7 3 g/dL      Albumin 3 9 g/dL      Total Bilirubin 0 25 mg/dL      eGFR 88 ml/min/1 73sq m     Narrative:      [place] guidelines for Chronic Kidney Disease (CKD):     Stage 1 with normal or high GFR (GFR > 90 mL/min/1 73 square meters)    Stage 2 Mild CKD (GFR = 60-89 mL/min/1 73 square meters)    Stage 3A Moderate CKD (GFR = 45-59 mL/min/1 73 square meters)    Stage 3B Moderate CKD (GFR = 30-44 mL/min/1 73 square meters)    Stage 4 Severe CKD (GFR = 15-29 mL/min/1 73 square meters)    Stage 5 End Stage CKD (GFR <15 mL/min/1 73 square meters)  Note: GFR calculation is accurate only with a steady state creatinine    Lipase [[id number]]  (Normal) Collected: 01/30/21 1839    Lab Status: Final result Specimen: Blood from Arm, Right Updated: 01/30/21 1907     Lipase 133 u/L     CBC and differential [[id number]] Collected: 01/30/21 1839    Lab Status: Final result Specimen: Blood from Arm, Right Updated: 01/30/21 1851     WBC 8 90 Thousand/uL      RBC 4 63 Million/uL      Hemoglobin 14 3 g/dL      Hematocrit 43 8 %      MCV 95 fL      MCH 30 9 pg      MCHC 32 6 g/dL      RDW 12 1 %      MPV 9 6 fL      Platelets 734 Thousands/uL      nRBC 0 /100 WBCs      Neutrophils Relative 54 %      Immat GRANS % 0 %      Lymphocytes Relative 38 %      Monocytes Relative 7 %      Eosinophils Relative 1 %      Basophils Relative 0 %      Neutrophils Absolute 4 76 Thousands/µL      Immature Grans Absolute 0 02 Thousand/uL      Lymphocytes Absolute 3 38 Thousands/µL      Monocytes Absolute 0 60 Thousand/µL      Eosinophils Absolute 0 10 Thousand/µL      Basophils Absolute 0 04 Thousands/µL                  CT abdomen pelvis with contrast   Final Result by Martin Jay MD (01/30 2019)      No acute intra-abdominal pathology  Specifically, no source of right lower quadrant pain identified  Cholelithiasis  No pericholecystic inflammatory changes              Workstation performed: [id number]                    Procedures  Procedures         ED Course                                           MDM  Number of Diagnoses or Management Options  Abdominal pain: new and requires workup  Gallstones: new and requires workup     Amount and/or Complexity of Data Reviewed  Clinical lab tests: ordered and reviewed  Tests in the radiology section of CPT®: ordered and reviewed  Tests in the medicine section of CPT®: ordered and reviewed  Independent visualization of images, tracings, or specimens: yes    Patient Progress  Patient progress: stable      Disposition  Final diagnoses:   Abdominal pain   Gallstones     Time reflects when diagnosis was documented in both MDM as applicable and the Disposition within this note     Time User Action Codes Description Comment    1/30/2021  9:47 PM Giovany HARRIS Add [R10 9] Abdominal pain     1/30/2021  9:47 PM Teodora Moss Add [K80 20] Gallstones       ED Disposition     ED Disposition Condition Date/Time Comment    Discharge Stable Sat Jan 30, 2021  9:47 PM Gurdeep Sow discharge to home/self care  Follow-up Information     Follow up With Specialties Details Why Contact Dae Keller MD Family Medicine Schedule an appointment as soon as possible for a visit   [address] [place] [address]  [phone]            Patient's Medications   Discharge Prescriptions    No medications on file     No discharge procedures on file      PDMP Review     None          ED Provider  Electronically Signed by           Raul Culver DO  01/30/21 3655 Bebeto Katz DO  01/30/21 9436

## 2021-02-18 DIAGNOSIS — Z23 ENCOUNTER FOR IMMUNIZATION: ICD-10-CM

## 2021-03-23 ENCOUNTER — IMMUNIZATIONS (OUTPATIENT)
Dept: FAMILY MEDICINE CLINIC | Facility: HOSPITAL | Age: 56
End: 2021-03-23

## 2021-03-23 DIAGNOSIS — Z23 ENCOUNTER FOR IMMUNIZATION: Primary | ICD-10-CM

## 2021-03-23 PROCEDURE — 0001A SARS-COV-2 / COVID-19 MRNA VACCINE (PFIZER-BIONTECH) 30 MCG: CPT

## 2021-03-23 PROCEDURE — 91300 SARS-COV-2 / COVID-19 MRNA VACCINE (PFIZER-BIONTECH) 30 MCG: CPT

## 2021-04-14 ENCOUNTER — IMMUNIZATIONS (OUTPATIENT)
Dept: FAMILY MEDICINE CLINIC | Facility: HOSPITAL | Age: 56
End: 2021-04-14

## 2021-04-14 DIAGNOSIS — Z23 ENCOUNTER FOR IMMUNIZATION: Primary | ICD-10-CM

## 2021-04-14 PROCEDURE — 91300 SARS-COV-2 / COVID-19 MRNA VACCINE (PFIZER-BIONTECH) 30 MCG: CPT

## 2021-04-14 PROCEDURE — 0002A SARS-COV-2 / COVID-19 MRNA VACCINE (PFIZER-BIONTECH) 30 MCG: CPT

## 2021-05-27 ENCOUNTER — HOSPITAL ENCOUNTER (EMERGENCY)
Facility: HOSPITAL | Age: 56
Discharge: HOME | End: 2021-05-27
Attending: EMERGENCY MEDICINE | Admitting: EMERGENCY MEDICINE
Payer: COMMERCIAL

## 2021-05-27 ENCOUNTER — APPOINTMENT (EMERGENCY)
Dept: RADIOLOGY | Facility: HOSPITAL | Age: 56
End: 2021-05-27
Payer: COMMERCIAL

## 2021-05-27 ENCOUNTER — TELEPHONE (OUTPATIENT)
Dept: FAMILY MEDICINE CLINIC | Facility: CLINIC | Age: 56
End: 2021-05-27

## 2021-05-27 VITALS
DIASTOLIC BLOOD PRESSURE: 63 MMHG | TEMPERATURE: 98.7 F | WEIGHT: 145.94 LBS | SYSTOLIC BLOOD PRESSURE: 121 MMHG | HEART RATE: 70 BPM | BODY MASS INDEX: 23.56 KG/M2 | OXYGEN SATURATION: 97 % | RESPIRATION RATE: 16 BRPM

## 2021-05-27 DIAGNOSIS — R42 VERTIGO: ICD-10-CM

## 2021-05-27 DIAGNOSIS — R00.2 PALPITATIONS: Primary | ICD-10-CM

## 2021-05-27 LAB
ALBUMIN SERPL BCP-MCNC: 4.2 G/DL (ref 3.5–5)
ALP SERPL-CCNC: 88 U/L (ref 46–116)
ALT SERPL W P-5'-P-CCNC: 25 U/L (ref 12–78)
ANION GAP SERPL CALCULATED.3IONS-SCNC: 12 MMOL/L (ref 4–13)
AST SERPL W P-5'-P-CCNC: 19 U/L (ref 5–45)
ATRIAL RATE: 79 BPM
BASOPHILS # BLD AUTO: 0.04 THOUSANDS/ΜL (ref 0–0.1)
BASOPHILS NFR BLD AUTO: 1 % (ref 0–1)
BILIRUB SERPL-MCNC: 0.42 MG/DL (ref 0.2–1)
BUN SERPL-MCNC: 15 MG/DL (ref 5–25)
CALCIUM SERPL-MCNC: 9 MG/DL (ref 8.3–10.1)
CHLORIDE SERPL-SCNC: 104 MMOL/L (ref 100–108)
CO2 SERPL-SCNC: 27 MMOL/L (ref 21–32)
CREAT SERPL-MCNC: 0.84 MG/DL (ref 0.6–1.3)
EOSINOPHIL # BLD AUTO: 0.06 THOUSAND/ΜL (ref 0–0.61)
EOSINOPHIL NFR BLD AUTO: 1 % (ref 0–6)
ERYTHROCYTE [DISTWIDTH] IN BLOOD BY AUTOMATED COUNT: 12.3 % (ref 11.6–15.1)
GFR SERPL CREATININE-BSD FRML MDRD: 78 ML/MIN/1.73SQ M
GLUCOSE SERPL-MCNC: 124 MG/DL (ref 65–140)
HCT VFR BLD AUTO: 46.8 % (ref 34.8–46.1)
HGB BLD-MCNC: 15.1 G/DL (ref 11.5–15.4)
IMM GRANULOCYTES # BLD AUTO: 0.01 THOUSAND/UL (ref 0–0.2)
IMM GRANULOCYTES NFR BLD AUTO: 0 % (ref 0–2)
LYMPHOCYTES # BLD AUTO: 2.67 THOUSANDS/ΜL (ref 0.6–4.47)
LYMPHOCYTES NFR BLD AUTO: 37 % (ref 14–44)
MCH RBC QN AUTO: 30.6 PG (ref 26.8–34.3)
MCHC RBC AUTO-ENTMCNC: 32.3 G/DL (ref 31.4–37.4)
MCV RBC AUTO: 95 FL (ref 82–98)
MONOCYTES # BLD AUTO: 0.41 THOUSAND/ΜL (ref 0.17–1.22)
MONOCYTES NFR BLD AUTO: 6 % (ref 4–12)
NEUTROPHILS # BLD AUTO: 3.95 THOUSANDS/ΜL (ref 1.85–7.62)
NEUTS SEG NFR BLD AUTO: 55 % (ref 43–75)
NRBC BLD AUTO-RTO: 0 /100 WBCS
P AXIS: 45 DEGREES
PLATELET # BLD AUTO: 321 THOUSANDS/UL (ref 149–390)
PMV BLD AUTO: 9.7 FL (ref 8.9–12.7)
POTASSIUM SERPL-SCNC: 3.9 MMOL/L (ref 3.5–5.3)
PR INTERVAL: 142 MS
PROT SERPL-MCNC: 8 G/DL (ref 6.4–8.2)
QRS AXIS: 12 DEGREES
QRSD INTERVAL: 88 MS
QT INTERVAL: 378 MS
QTC INTERVAL: 433 MS
RBC # BLD AUTO: 4.94 MILLION/UL (ref 3.81–5.12)
SODIUM SERPL-SCNC: 143 MMOL/L (ref 136–145)
T WAVE AXIS: 24 DEGREES
TROPONIN I SERPL-MCNC: <0.02 NG/ML
VENTRICULAR RATE: 79 BPM
WBC # BLD AUTO: 7.14 THOUSAND/UL (ref 4.31–10.16)

## 2021-05-27 PROCEDURE — 96360 HYDRATION IV INFUSION INIT: CPT

## 2021-05-27 PROCEDURE — 93010 ELECTROCARDIOGRAM REPORT: CPT | Performed by: INTERNAL MEDICINE

## 2021-05-27 PROCEDURE — 36415 COLL VENOUS BLD VENIPUNCTURE: CPT | Performed by: EMERGENCY MEDICINE

## 2021-05-27 PROCEDURE — 96361 HYDRATE IV INFUSION ADD-ON: CPT

## 2021-05-27 PROCEDURE — 71046 X-RAY EXAM CHEST 2 VIEWS: CPT

## 2021-05-27 PROCEDURE — 84484 ASSAY OF TROPONIN QUANT: CPT | Performed by: EMERGENCY MEDICINE

## 2021-05-27 PROCEDURE — 99285 EMERGENCY DEPT VISIT HI MDM: CPT | Performed by: EMERGENCY MEDICINE

## 2021-05-27 PROCEDURE — 93005 ELECTROCARDIOGRAM TRACING: CPT

## 2021-05-27 PROCEDURE — 80053 COMPREHEN METABOLIC PANEL: CPT | Performed by: EMERGENCY MEDICINE

## 2021-05-27 PROCEDURE — 85025 COMPLETE CBC W/AUTO DIFF WBC: CPT | Performed by: EMERGENCY MEDICINE

## 2021-05-27 PROCEDURE — 99285 EMERGENCY DEPT VISIT HI MDM: CPT

## 2021-05-27 RX ORDER — MECLIZINE HCL 12.5 MG/1
25 TABLET ORAL ONCE
Status: COMPLETED | OUTPATIENT
Start: 2021-05-27 | End: 2021-05-27

## 2021-05-27 RX ORDER — LEVOTHYROXINE SODIUM 0.07 MG/1
75 TABLET ORAL DAILY
COMMUNITY
Start: 2021-04-09

## 2021-05-27 RX ADMIN — MECLIZINE 25 MG: 12.5 TABLET ORAL at 13:13

## 2021-05-27 RX ADMIN — SODIUM CHLORIDE 1000 ML: 0.9 INJECTION, SOLUTION INTRAVENOUS at 12:11

## 2021-05-27 NOTE — TELEPHONE ENCOUNTER
PT CALLED AND STATES SHE IS HAVING PALPITATIONS AND ARRHYTHMIAS  SHE SAID IT IS WORSE WHEN SHE BENDS OVER    I INSTRUCTED HER TO GO TO ER ASAP FOR CARDIAC WORK UP

## 2021-05-27 NOTE — ED PROVIDER NOTES
History  Chief Complaint   Patient presents with    Shortness of Breath     pt reports she was feeling better after covid but last few days has been feeling worse and gets dizzy with bending over  54 y/o female presents today feeling dizziness and palpitations  She states the symptoms are worse with position change  She states she had similar symptoms about 6 years ago and had a full cardiac workup which was negative  She called her PCP today to try to get an appointment but she said she got nervous that she could not wait    She states she had COVID in January and this is how she felt after she recovered until about March  She does have a history of hypothyroidism  She had lab work done about 6 weeks ago and her Synthroid was decreased  Repeat lab work was done on Monday and her thyroid numbers are now normal       History provided by:  Patient  Palpitations  Palpitations quality:  Irregular  Timing:  Intermittent  Chronicity:  Recurrent  Context comment:  See HPI  Relieved by:  None tried  Exacerbated by: Position change  Ineffective treatments:  None tried  Associated symptoms: dizziness and shortness of breath    Associated symptoms: no back pain, no cough, no lower extremity edema, no nausea, no near-syncope, no PND and no weakness    Risk factors: hx of thyroid disease    Risk factors: no hx of atrial fibrillation        Prior to Admission Medications   Prescriptions Last Dose Informant Patient Reported? Taking?    Multiple Vitamin (multivitamin) capsule Not Taking at Unknown time  Yes No   Sig: Take 1 capsule by mouth daily   levothyroxine 75 mcg tablet 5/27/2021 at 0800  Yes Yes   Sig: Take 75 mcg by mouth daily      Facility-Administered Medications: None       Past Medical History:   Diagnosis Date    Allergic rhinitis     Resolved 8/23/2017     Cervical neuritis     Disease of thyroid gland     Hashimoto's thyroiditis     Migraine 08/24/2020    Daily headaches    Miscarriage 1996    Primary localized osteoarthritis of right knee 2018    Varicella     As a child    Vitamin D deficiency        Past Surgical History:   Procedure Laterality Date     SECTION      DILATION AND CURETTAGE OF UTERUS  1996    KNEE SURGERY         Family History   Problem Relation Age of Onset    Hypertension Mother     Hyperlipidemia Mother     Osteoarthritis Mother     Thyroid disease Mother     Hyperthyroidism Mother     Asthma Mother     Heart attack Father     Heart failure Father          from heart attack  at age 68   Anna Rubio No Known Problems Sister     No Known Problems Brother     No Known Problems Daughter     Heart block Son     Arthritis Family     Osteoporosis Family      I have reviewed and agree with the history as documented  E-Cigarette/Vaping    E-Cigarette Use Never User      E-Cigarette/Vaping Substances    Nicotine No     THC No     CBD No     Flavoring No     Other No     Unknown No      Social History     Tobacco Use    Smoking status: Never Smoker    Smokeless tobacco: Never Used   Substance Use Topics    Alcohol use: No    Drug use: No       Review of Systems   Constitutional: Negative for chills and fatigue  HENT: Negative for congestion, postnasal drip and trouble swallowing  Eyes: Negative for visual disturbance  Respiratory: Positive for shortness of breath  Negative for cough and chest tightness  Cardiovascular: Positive for palpitations  Negative for PND and near-syncope  Gastrointestinal: Negative for nausea  Genitourinary: Negative for dysuria  Musculoskeletal: Negative for back pain  Skin: Negative for pallor, rash and wound  Allergic/Immunologic: Negative for immunocompromised state  Neurological: Positive for dizziness  Negative for weakness and light-headedness  Psychiatric/Behavioral: Negative for confusion  Physical Exam  Physical Exam  Vitals signs and nursing note reviewed     Constitutional: Appearance: She is well-developed  HENT:      Head: Normocephalic and atraumatic  Mouth/Throat:      Mouth: Mucous membranes are moist       Pharynx: Uvula midline  Tonsils: No tonsillar exudate  Eyes:      Pupils: Pupils are equal, round, and reactive to light  Neck:      Musculoskeletal: Normal range of motion and neck supple  Cardiovascular:      Rate and Rhythm: Normal rate and regular rhythm  No extrasystoles are present  Pulses: No decreased pulses  Pulmonary:      Effort: Pulmonary effort is normal       Breath sounds: Normal breath sounds  Abdominal:      General: Bowel sounds are normal       Palpations: Abdomen is soft  Tenderness: There is no abdominal tenderness  There is no guarding or rebound  Musculoskeletal:         General: No tenderness or deformity  Skin:     General: Skin is warm and dry  Capillary Refill: Capillary refill takes less than 2 seconds  Neurological:      General: No focal deficit present  Mental Status: She is alert and oriented to person, place, and time  Comments: Patient moving all extremities equally, no focal neuro deficits noted         Psychiatric:         Mood and Affect: Mood normal          Behavior: Behavior normal          Vital Signs  ED Triage Vitals [05/27/21 1126]   Temperature Pulse Respirations Blood Pressure SpO2   98 7 °F (37 1 °C) 88 16 147/70 99 %      Temp Source Heart Rate Source Patient Position - Orthostatic VS BP Location FiO2 (%)   Oral Monitor Sitting Right arm --      Pain Score       No Pain           Vitals:    05/27/21 1126 05/27/21 1300 05/27/21 1330 05/27/21 1400   BP: 147/70 134/67  121/63   Pulse: 88 76 82 70   Patient Position - Orthostatic VS: Sitting            Visual Acuity      ED Medications  Medications   sodium chloride 0 9 % bolus 1,000 mL (0 mL Intravenous Stopped 5/27/21 1404)   meclizine (ANTIVERT) tablet 25 mg (25 mg Oral Given 5/27/21 1313)       Diagnostic Studies  Results Reviewed     Procedure Component Value Units Date/Time    Comprehensive metabolic panel [[id number]] Collected: 05/27/21 1201    Lab Status: Final result Specimen: Blood from Arm, Right Updated: 05/27/21 1252     Sodium 143 mmol/L      Potassium 3 9 mmol/L      Chloride 104 mmol/L      CO2 27 mmol/L      ANION GAP 12 mmol/L      BUN 15 mg/dL      Creatinine 0 84 mg/dL      Glucose 124 mg/dL      Calcium 9 0 mg/dL      AST 19 U/L      ALT 25 U/L      Alkaline Phosphatase 88 U/L      Total Protein 8 0 g/dL      Albumin 4 2 g/dL      Total Bilirubin 0 42 mg/dL      eGFR 78 ml/min/1 73sq m     Narrative:      National Kid[employer] Foundation guidelines for Chronic Kidney Disease (CKD):     Stage 1 with normal or high GFR (GFR > 90 mL/min/1 73 square meters)    Stage 2 Mild CKD (GFR = 60-89 mL/min/1 73 square meters)    Stage 3A Moderate CKD (GFR = 45-59 mL/min/1 73 square meters)    Stage 3B Moderate CKD (GFR = 30-44 mL/min/1 73 square meters)    Stage 4 Severe CKD (GFR = 15-29 mL/min/1 73 square meters)    Stage 5 End Stage CKD (GFR <15 mL/min/1 73 square meters)  Note: GFR calculation is accurate only with a steady state creatinine    Troponin I [[id number]]  (Normal) Collected: 05/27/21 1201    Lab Status: Final result Specimen: Blood from Arm, Right Updated: 05/27/21 1250     Troponin I <0 02 ng/mL     CBC and differential [[id number]]  (Abnormal) Collected: 05/27/21 1201    Lab Status: Final result Specimen: Blood from Arm, Right Updated: 05/27/21 1215     WBC 7 14 Thousand/uL      RBC 4 94 Million/uL      Hemoglobin 15 1 g/dL      Hematocrit 46 8 %      MCV 95 fL      MCH 30 6 pg      MCHC 32 3 g/dL      RDW 12 3 %      MPV 9 7 fL      Platelets 692 Thousands/uL      nRBC 0 /100 WBCs      Neutrophils Relative 55 %      Immat GRANS % 0 %      Lymphocytes Relative 37 %      Monocytes Relative 6 %      Eosinophils Relative 1 %      Basophils Relative 1 %      Neutrophils Absolute 3 95 Thousands/µL      Immature Grans Absolute 0 01 Thousand/uL      Lymphocytes Absolute 2 67 Thousands/µL      Monocytes Absolute 0 41 Thousand/µL      Eosinophils Absolute 0 06 Thousand/µL      Basophils Absolute 0 04 Thousands/µL                  XR chest 2 views   Final Result by Zulma He MD (05/27 2124)      No acute cardiopulmonary disease  Workstation performed: [id number]                    Procedures  ECG 12 Lead Documentation Only    Date/Time: 5/27/2021 12:24 PM  Performed by: Kim Romano DO  Authorized by: Lorrie Miranda DO     Indications / Diagnosis:  Palpitations  ECG reviewed by me, the ED Provider: yes    Patient location:  ED  Previous ECG:     Previous ECG:  Compared to current  Comments:      Normal sinus rhythm at 79 beats per minute  Normal axis, normal intervals, no ST T wave abnormalities suggestive of ischemia  QTC is normal   No significant change compared to prior from 02/11/2015  ED Course                                           MDM  Number of Diagnoses or Management Options  Palpitations: new and requires workup  Vertigo: new and requires workup  Diagnosis management comments: 1:53 PM  Labs, EKG and CXR are negative  When I inquired about stimulants the patient states she's been drinking more green tea than normal   I recommended she abstain from caffeine and other stimulants  Stable for discharge  Would recommend f/u with PCP for holter monitor as outpatient  RTED instructions reviewed           Amount and/or Complexity of Data Reviewed  Clinical lab tests: ordered and reviewed  Tests in the radiology section of CPT®: reviewed and ordered  Tests in the medicine section of CPT®: ordered and reviewed  Review and summarize past medical records: yes  Independent visualization of images, tracings, or specimens: yes    Risk of Complications, Morbidity, and/or Mortality  Presenting problems: high  Diagnostic procedures: high  Management options: high    Patient Progress  Patient progress: stable      Disposition  Final diagnoses:   Palpitations   Vertigo     Time reflects when diagnosis was documented in both MDM as applicable and the Disposition within this note     Time User Action Codes Description Comment    5/27/2021 12:31 PM Lois Espinozanelida Add [R00 2] Palpitations     5/27/2021  1:52 PM Ruben Bill Gordons Add [R42] Vertigo       ED Disposition     ED Disposition Condition Date/Time Comment    Discharge Stable Thu May 27, 2021  1:52 PM Yousif Reece discharge to home/self care  Follow-up Information     Follow up With Specialties Details Why Contact Info Additional Information    Ashlyn Velasquez MD Family Medicine Schedule an appointment as soon as possible for a visit  As needed [address] [facility]  [place] [address] [phone]       [place] [address] Emergency Department Emergency Medicine  If symptoms worsen [address] [facility] [id number] [place] [address] Emergency Department, [address] [facility], [facility]    Thierno Balbuena MD Cardiology Schedule an appointment as soon as possible for a visit   [address] [place] [address]  [phone]             Discharge Medication List as of 5/27/2021  1:53 PM      CONTINUE these medications which have NOT CHANGED    Details   levothyroxine 75 mcg tablet Take 75 mcg by mouth daily, Starting Fri 4/9/2021, Historical Med      Multiple Vitamin (multivitamin) capsule Take 1 capsule by mouth daily, Historical Med           No discharge procedures on file      PDMP Review     None          ED Provider  Electronically Signed by           Otto Yip DO  05/27/21 7205

## 2021-06-07 ENCOUNTER — OFFICE VISIT (OUTPATIENT)
Dept: CARDIOLOGY CLINIC | Facility: CLINIC | Age: 56
End: 2021-06-07
Payer: COMMERCIAL

## 2021-06-07 VITALS
HEART RATE: 82 BPM | HEIGHT: 66 IN | DIASTOLIC BLOOD PRESSURE: 70 MMHG | WEIGHT: 145 LBS | SYSTOLIC BLOOD PRESSURE: 122 MMHG | TEMPERATURE: 97.6 F | OXYGEN SATURATION: 99 % | RESPIRATION RATE: 18 BRPM | BODY MASS INDEX: 23.3 KG/M2

## 2021-06-07 DIAGNOSIS — R07.9 CHEST PAIN IN ADULT: ICD-10-CM

## 2021-06-07 DIAGNOSIS — R00.2 PALPITATIONS: Primary | ICD-10-CM

## 2021-06-07 DIAGNOSIS — R06.02 SHORTNESS OF BREATH ON EXERTION: ICD-10-CM

## 2021-06-07 DIAGNOSIS — Z82.49 FAMILY HISTORY OF HEART DISEASE: ICD-10-CM

## 2021-06-07 PROCEDURE — 99243 OFF/OP CNSLTJ NEW/EST LOW 30: CPT | Performed by: INTERNAL MEDICINE

## 2021-06-07 PROCEDURE — 3008F BODY MASS INDEX DOCD: CPT | Performed by: INTERNAL MEDICINE

## 2021-06-07 PROCEDURE — 93000 ELECTROCARDIOGRAM COMPLETE: CPT | Performed by: INTERNAL MEDICINE

## 2021-06-07 PROCEDURE — 1036F TOBACCO NON-USER: CPT | Performed by: INTERNAL MEDICINE

## 2021-06-07 NOTE — PROGRESS NOTES
Consultation - Cardiology Office   [facility]  Za Son 55 y o  female MRN: [id number]  : 1965  Unit/Bed#:  Encounter: [id number]      ASSESSMENT:  Shortness of breath on exertion  Was evaluated in the ED on  with normal cardiac troponin,   normal EKG without any arrhythmias    Chest Pressure  Occasional retrosternal chest pressure along with dyspnea    History of COVID-19 in 2020  At that time she had symptoms of dyspnea, chest pressure and palpitations which subsided and now have returned    ? Hypertension:  BP today is 122/70 with a heart rate of 82    Palpitations  Holter, 2015:  rare PACs, atrial tachycardia of 6 beats, and PVCs       Echo, 2015 EF 53%, possible mild anteroseptal hypokinesia  Stress test 2015:  EKG was negative for ischemia    Hypothyroidism  History of multinodular goiter and Hashimoto's thyroiditis      RECOMMENDATIONS:  48 hour Holter monitor  Echocardiogram  Nuclear stress test    Thank you for your consultation  If you have any question please call me at [phone]      Primary Care Physician Requesting Consult: Chaparro Rodriguez MD      Reason for Consult / Principal Problem:  Chest pain, palpitations, and dyspnea        HPI :     Za Son is a 55y o  year old female who was referred by primary care doctor for evaluation of cardiac symptoms  Patient had a COVID-19 infection in 2020 following which she had cardiac symptoms including chest pain, dyspnea and palpitations  The symptoms subsequently had subsided but have not returned with frequent palpitations dyspnea on mild exertion and retrosternal chest pressure and tightness  Her echocardiogram in  showed mild anteroseptal hypokinesia    REVIEW OF SYSTEMS:    Constitutional: Negative for activity change, appetite change, chills, fatigue, fever and unexpected weight change  HENT: Negative for congestion, sore throat and trouble swallowing      Eyes: Negative for discharge and redness  Respiratory: Negative for apnea, cough, positive for chest tightness, shortness of breath  Cardiovascular:  Positive for chest pain, shortness of breath, palpitations and negative for leg swelling  Gastrointestinal: Negative for abdominal distention, abdominal pain, anal bleeding, blood in stool, constipation, diarrhea, nausea and vomiting  Endocrine: Negative for polydipsia, polyphagia and polyuria  Genitourinary: Negative for difficulty urinating, dysuria, flank pain and hematuria  Musculoskeletal: Negative for arthralgias, myalgias and neck stiffness  Skin: Negative for pallor and rash  Allergic/Immunologic: Negative for environmental allergies  Neurological: Negative for dizziness, syncope, light-headedness, numbness and headaches  Hematological: Negative for adenopathy  Does not bruise/bleed easily  Psychiatric/Behavioral: Negative for confusion and hallucinations  The patient is not nervous/anxious        Historical Information   Past Medical History:   Diagnosis Date    Allergic rhinitis     Resolved 2017     Cervical neuritis     Disease of thyroid gland     Hashimoto's thyroiditis     Migraine 2020    Daily headaches    Miscarriage 1996    Primary localized osteoarthritis of right knee 2018    Varicella     As a child    Vitamin D deficiency      Past Surgical History:   Procedure Laterality Date     SECTION      DILATION AND CURETTAGE OF UTERUS  1996    KNEE SURGERY       Social History     Substance and Sexual Activity   Alcohol Use No     Social History     Substance and Sexual Activity   Drug Use No     Social History     Tobacco Use   Smoking Status Never Smoker   Smokeless Tobacco Never Used     Family History:   Family History   Problem Relation Age of Onset    Hypertension Mother     Hyperlipidemia Mother     Osteoarthritis Mother     Thyroid disease Mother     Hyperthyroidism Mother     Asthma Mother  Heart attack Father     Heart failure Father          from heart attack  at age 68    No Known Problems Sister     No Known Problems Brother     No Known Problems Daughter     Heart block Son     Arthritis Family     Osteoporosis Family        Meds/Allergies     No Known Allergies    Current Outpatient Medications:     levothyroxine 75 mcg tablet, Take 75 mcg by mouth daily, Disp: , Rfl:     Multiple Vitamin (multivitamin) capsule, Take 1 capsule by mouth daily, Disp: , Rfl:     Vitals: Resp  rate 18, height 5' 6" (1 676 m)  BP is 122/70 mmHg  HR 72/Min  Body mass index is 23 56 kg/m²  There were no vitals filed for this visit  BP Readings from Last 3 Encounters:   21 121/63   21 130/70   20 153/90         PHYSICAL EXAMINATION:  Neurologic:  Alert & oriented x 3, no new focal deficits, Not in any acute distress,  Constitutional:  Well developed, well nourished, non-toxic appearance   Eyes:  Pupil equal and reacting to light, conjunctiva normal, No JVP, No LNP   HENT:  Atraumatic, oropharynx moist, Neck- normal range of motion, no tenderness,  Neck supple   Respiratory:  Bilateral air entry, mostly clear to auscultation  Cardiovascular: S1-S2 regular with a I/VI systolic murmur   GI:  Soft, nondistended, normal bowel sounds, nontender, no hepatosplenomegaly appreciated  Musculoskeletal:  No edema, no tenderness, no deformities  Skin:  Well hydrated, no rash   Lymphatic:  No lymphadenopathy noted   Extremities:  No edema and distal pulses are present    Diagnostic Studies Review Cardio:      EKG:  Normal sinus rhythm, heart rate 72 per minute, nonspecific T-wave abnormality    Cardiac testing:   No results found for this or any previous visit  Imaging:  Chest X-Ray:   Xr Chest 2 Views    Result Date: 2021  Impression No acute cardiopulmonary disease   Workstation performed: [id number]     Xr Chest Pa & Lateral    Result Date: 2020  Impression No acute cardiopulmonary disease  Workstation performed: [id number]       CT-scan of the chest:     No CTA results available for this patient    Lab Review   Lab Results   Component Value Date    WBC 7 14 05/27/2021    HGB 15 1 05/27/2021    HCT 46 8 (H) 05/27/2021    MCV 95 05/27/2021    RDW 12 3 05/27/2021     05/27/2021     BMP:  Lab Results   Component Value Date    SODIUM 143 05/27/2021    K 3 9 05/27/2021     05/27/2021    CO2 27 05/27/2021    ANIONGAP 7 02/11/2015    BUN 15 05/27/2021    CREATININE 0 84 05/27/2021    GLUC 124 05/27/2021    CALCIUM 9 0 05/27/2021    EGFR 78 05/27/2021     LFT:  Lab Results   Component Value Date    AST 19 05/27/2021    ALT 25 05/27/2021    ALKPHOS 88 05/27/2021    TP 8 0 05/27/2021    ALB 4 2 05/27/2021      No results found for: [id number]  No components found for: [employer] [facility]  Lab Results   Component Value Date    HGBA1C 5 4 11/07/2019     Lipid Profile:   Lab Results   Component Value Date    CHOLESTEROL 206 (H) 11/07/2019    HDL 67 11/07/2019    LDLCALC 123 (H) 11/07/2019    TRIG 72 11/07/2019     Lab Results   Component Value Date    CHOLESTEROL 206 (H) 11/07/2019     Lab Results   Component Value Date    TROPONINI <0 02 05/27/2021     No results found for: NTBNP   Recent Results (from the past 672 hour(s))   CBC and differential    Collection Time: 05/27/21 12:01 PM   Result Value Ref Range    WBC 7 14 4 31 - 10 16 Thousand/uL    RBC 4 94 3 81 - 5 12 Million/uL    Hemoglobin 15 1 11 5 - 15 4 g/dL    Hematocrit 46 8 (H) 34 8 - 46 1 %    MCV 95 82 - 98 fL    MCH 30 6 26 8 - 34 3 pg    MCHC 32 3 31 4 - 37 4 g/dL    RDW 12 3 11 6 - 15 1 %    MPV 9 7 8 9 - 12 7 fL    Platelets 451 425 - 312 Thousands/uL    nRBC 0 /100 WBCs    Neutrophils Relative 55 43 - 75 %    Immat GRANS % 0 0 - 2 %    Lymphocytes Relative 37 14 - 44 %    Monocytes Relative 6 4 - 12 %    Eosinophils Relative 1 0 - 6 %    Basophils Relative 1 0 - 1 %    Neutrophils Absolute 3 95 1 85 - 7 62 Thousands/µL Immature Grans Absolute 0 01 0 00 - 0 20 Thousand/uL    Lymphocytes Absolute 2 67 0 60 - 4 47 Thousands/µL    Monocytes Absolute 0 41 0 17 - 1 22 Thousand/µL    Eosinophils Absolute 0 06 0 00 - 0 61 Thousand/µL    Basophils Absolute 0 04 0 00 - 0 10 Thousands/µL   Comprehensive metabolic panel    Collection Time: 05/27/21 12:01 PM   Result Value Ref Range    Sodium 143 136 - 145 mmol/L    Potassium 3 9 3 5 - 5 3 mmol/L    Chloride 104 100 - 108 mmol/L    CO2 27 21 - 32 mmol/L    ANION GAP 12 4 - 13 mmol/L    BUN 15 5 - 25 mg/dL    Creatinine 0 84 0 60 - 1 30 mg/dL    Glucose 124 65 - 140 mg/dL    Calcium 9 0 8 3 - 10 1 mg/dL    AST 19 5 - 45 U/L    ALT 25 12 - 78 U/L    Alkaline Phosphatase 88 46 - 116 U/L    Total Protein 8 0 6 4 - 8 2 g/dL    Albumin 4 2 3 5 - 5 0 g/dL    Total Bilirubin 0 42 0 20 - 1 00 mg/dL    eGFR 78 ml/min/1 73sq m   Troponin I    Collection Time: 05/27/21 12:01 PM   Result Value Ref Range    Troponin I <0 02 <=0 04 ng/mL   ECG 12 lead    Collection Time: 05/27/21 12:06 PM   Result Value Ref Range    Ventricular Rate 79 BPM    Atrial Rate 79 BPM    DC Interval 142 ms    QRSD Interval 88 ms    QT Interval 378 ms    QTC Interval 433 ms    P Axis 45 degrees    QRS Axis 12 degrees    T Wave Axis 24 degrees           Dr Wanda Bates MD, [facility]      "This note has been constructed using a voice recognition system  Therefore there may be syntax, spelling, and/or grammatical errors   Please call if you have any questions  "

## 2021-06-16 ENCOUNTER — HOSPITAL ENCOUNTER (OUTPATIENT)
Dept: NON INVASIVE DIAGNOSTICS | Facility: HOSPITAL | Age: 56
Discharge: HOME | End: 2021-06-16
Attending: INTERNAL MEDICINE
Payer: COMMERCIAL

## 2021-06-16 ENCOUNTER — HOSPITAL ENCOUNTER (OUTPATIENT)
Dept: RADIOLOGY | Facility: HOSPITAL | Age: 56
Discharge: HOME | End: 2021-06-16
Attending: INTERNAL MEDICINE
Payer: COMMERCIAL

## 2021-06-16 DIAGNOSIS — R00.2 PALPITATIONS: ICD-10-CM

## 2021-06-16 DIAGNOSIS — R06.02 SHORTNESS OF BREATH ON EXERTION: ICD-10-CM

## 2021-06-16 DIAGNOSIS — R07.9 CHEST PAIN IN ADULT: ICD-10-CM

## 2021-06-16 DIAGNOSIS — Z82.49 FAMILY HISTORY OF HEART DISEASE: ICD-10-CM

## 2021-06-16 PROCEDURE — G1004 CDSM NDSC: HCPCS

## 2021-06-16 PROCEDURE — 93306 TTE W/DOPPLER COMPLETE: CPT

## 2021-06-16 PROCEDURE — 93005 ELECTROCARDIOGRAM TRACING: CPT

## 2021-06-16 PROCEDURE — 93225 XTRNL ECG REC<48 HRS REC: CPT

## 2021-06-16 PROCEDURE — 78452 HT MUSCLE IMAGE SPECT MULT: CPT

## 2021-06-16 PROCEDURE — A9502 TC99M TETROFOSMIN: HCPCS

## 2021-06-16 PROCEDURE — 93017 CV STRESS TEST TRACING ONLY: CPT

## 2021-06-16 PROCEDURE — 93226 XTRNL ECG REC<48 HR SCAN A/R: CPT

## 2021-06-17 ENCOUNTER — TELEPHONE (OUTPATIENT)
Dept: CARDIOLOGY CLINIC | Facility: CLINIC | Age: 56
End: 2021-06-17

## 2021-06-17 PROCEDURE — 93016 CV STRESS TEST SUPVJ ONLY: CPT | Performed by: INTERNAL MEDICINE

## 2021-06-17 PROCEDURE — 93018 CV STRESS TEST I&R ONLY: CPT | Performed by: INTERNAL MEDICINE

## 2021-06-17 PROCEDURE — 93306 TTE W/DOPPLER COMPLETE: CPT | Performed by: INTERNAL MEDICINE

## 2021-06-17 PROCEDURE — 78452 HT MUSCLE IMAGE SPECT MULT: CPT | Performed by: INTERNAL MEDICINE

## 2021-06-17 NOTE — TELEPHONE ENCOUNTER
----- Message from Adrianna Morales MD sent at 6/17/2021 11:03 AM EDT -----  Please inform the patient that the stress test showed NO evidence of any significant blockage in the blood vessels of your heart

## 2021-06-17 NOTE — TELEPHONE ENCOUNTER
----- Message from Cathleen Ag MD sent at 6/17/2021 11:02 AM EDT -----   Please call and inform patient that the Echocardiogram showed normal pumping function of the heart  No significant valve abnormality was seen

## 2021-06-18 LAB
ATRIAL RATE: 108 BPM
P AXIS: 72 DEGREES
PR INTERVAL: 140 MS
QRS AXIS: 66 DEGREES
QRSD INTERVAL: 80 MS
QT INTERVAL: 320 MS
QTC INTERVAL: 428 MS
T WAVE AXIS: 50 DEGREES
VENTRICULAR RATE: 108 BPM

## 2021-06-18 PROCEDURE — 93010 ELECTROCARDIOGRAM REPORT: CPT | Performed by: INTERNAL MEDICINE

## 2021-06-21 LAB
CHEST PAIN STATEMENT: NORMAL
MAX DIASTOLIC BP: 90 MMHG
MAX HEART RATE: 157 BPM
MAX PREDICTED HEART RATE: 165 BPM
MAX. SYSTOLIC BP: 160 MMHG
PROTOCOL NAME: NORMAL
TARGET HR FORMULA: NORMAL
TEST INDICATION: NORMAL
TIME IN EXERCISE PHASE: NORMAL

## 2021-06-23 ENCOUNTER — TELEPHONE (OUTPATIENT)
Dept: CARDIOLOGY CLINIC | Facility: CLINIC | Age: 56
End: 2021-06-23

## 2021-06-23 NOTE — TELEPHONE ENCOUNTER
----- Message from Joao Chew MD sent at 6/23/2021 10:58 AM EDT -----   Please call and inform the patient that her blood test showed that  her cholesterol is in acceptable range  She should continue with low-cholesterol diet and regular cardiovascular exercise

## 2021-06-23 NOTE — TELEPHONE ENCOUNTER
I spoke with patient upon callback  Made aware of results  Patient inquired about holter results  Assured that once reviewed by doctor we would give her a call

## 2021-06-28 PROCEDURE — 93227 XTRNL ECG REC<48 HR R&I: CPT | Performed by: INTERNAL MEDICINE

## 2021-06-29 ENCOUNTER — TELEPHONE (OUTPATIENT)
Dept: CARDIOLOGY CLINIC | Facility: CLINIC | Age: 56
End: 2021-06-29

## 2021-06-29 NOTE — TELEPHONE ENCOUNTER
----- Message from Amie Wilkerson MD sent at 6/29/2021  8:14 AM EDT -----  Please call and inform patient that the Holter monitor was reviewed  This did not reveal any significant abnormality or arrhythmia  The symptoms correlated with normal rhythm and no abnormality on the monitor  No immediate treatment or action is needed    Will discuss further at next office visit

## 2021-09-01 ENCOUNTER — OFFICE VISIT (OUTPATIENT)
Dept: CARDIOLOGY CLINIC | Facility: CLINIC | Age: 56
End: 2021-09-01
Payer: COMMERCIAL

## 2021-09-01 VITALS
HEIGHT: 66 IN | DIASTOLIC BLOOD PRESSURE: 80 MMHG | HEART RATE: 92 BPM | TEMPERATURE: 97.6 F | BODY MASS INDEX: 22.98 KG/M2 | RESPIRATION RATE: 18 BRPM | OXYGEN SATURATION: 99 % | WEIGHT: 143 LBS | SYSTOLIC BLOOD PRESSURE: 110 MMHG

## 2021-09-01 DIAGNOSIS — R07.9 CHEST PAIN IN ADULT: ICD-10-CM

## 2021-09-01 DIAGNOSIS — R06.02 SHORTNESS OF BREATH ON EXERTION: Primary | ICD-10-CM

## 2021-09-01 DIAGNOSIS — R00.2 PALPITATIONS: ICD-10-CM

## 2021-09-01 PROCEDURE — 99214 OFFICE O/P EST MOD 30 MIN: CPT | Performed by: INTERNAL MEDICINE

## 2021-09-01 PROCEDURE — 3008F BODY MASS INDEX DOCD: CPT | Performed by: INTERNAL MEDICINE

## 2021-09-01 NOTE — PROGRESS NOTES
Progress Note - Cardiology Office  [facility]    Elia Ventura 55 y o  female MRN: [id number]  : 1965  Encounter: [id number]      ASSESSMENT:  Shortness of breath on exertion    TTE, 2015   EF 53%, possible mild anteroseptal hypokinesia    TTE, 2021:  EF 55%,Trace MR, mild MR     History Chest Pressure  2021:  Resolved    Stress test 2015:    EKG was negative for ischemia    Nuclear stress test, 2021   normal     History of COVID-19 in 2020  At that time she had symptoms of dyspnea, chest pressure and palpitations which subsided and now have returned     ? Hypertension:  BP today is 110/80 with a heart rate of 92 per minute     Palpitations  Occasional fluttering sensation in the chest    Holter, 2015:  rare PACs, atrial tachycardia of 6 beats, and PVCs     Holter monitor, 48 hour, 2021   IMPRESSION:  1  48 hour holter tracing report without any significant abnormalities or arrhythmia  2  Rhythm was sinus throughout monitoring period  3  There were very rare episodes of Supraventricular ectopic activity  4  There were very rare episodes of Ventricular ectopic activity  5   There were no episodes of SVT, PSVT,  NSVT, VT, atrial fibrillation or flutter  5  Patient's symptoms of heart fluttering, lightheadedness and dizziness corresponded to  Sinus rhythm and mild sinus tachycardia on monitor      Hypothyroidism  History of multinodular goiter and Hashimoto's thyroiditis     Normal pressure Glaucoma  As per eye specialist this could be were sent by episodes of hypoxia     RECOMMENDATIONS:  Advised on regular cardiovascular exercise  Discussed findings of all the test results  Patient is going to talk to PCP about sleep study since her eye specialist told her that she has glaucoma which could be worsened by episodes of hypoxia           Please call [phone] if any questions      HPI :     Elia Ventura is a 55y o  year old female who came for follow up  She was seen previously with chest pressure,  palpitations and dyspnea on exertion  She had a nuclear stress test which was normal, echocardiogram without any significant abnormality and also relatively benign Holter monitor  Symptoms during the monitoring correlated with sinus rhythm or mild sinus tachycardia  She has recently been told that she has normal-pressure glaucoma by her ophthalmologist   He is also mention that this could be worsened by periods of hypoxia  Therefore patient is going to talk to PCP about possible sleep study to rule out sleep apnea    REVIEW OF SYSTEMS:  Review of Systems   Eyes: Positive for visual disturbance  Respiratory: Positive for shortness of breath  Cardiovascular: Positive for palpitations           Historical Information   Past Medical History:   Diagnosis Date    Allergic rhinitis     Resolved 2017     Cervical neuritis     Disease of thyroid gland     Hashimoto's thyroiditis     Migraine 2020    Daily headaches    Miscarriage 1996    Primary localized osteoarthritis of right knee 2018    Varicella     As a child    Vitamin D deficiency      Past Surgical History:   Procedure Laterality Date     SECTION      DILATION AND CURETTAGE OF UTERUS  1996    KNEE SURGERY       Social History     Substance and Sexual Activity   Alcohol Use No     Social History     Substance and Sexual Activity   Drug Use No     Social History     Tobacco Use   Smoking Status Never Smoker   Smokeless Tobacco Never Used     Family History:   Family History   Problem Relation Age of Onset    Hypertension Mother     Hyperlipidemia Mother     Osteoarthritis Mother     Thyroid disease Mother     Hyperthyroidism Mother     Asthma Mother     Heart attack Father     Heart failure Father          from heart attack  at age 68    No Known Problems Sister     No Known Problems Brother     No Known Problems Daughter     Heart block Son     Arthritis Family     Osteoporosis Family        Meds/Allergies     No Known Allergies    Current Outpatient Medications:     Calcium Carbonate (CALTRATE 600 PO), Take by mouth, Disp: , Rfl:     levothyroxine 75 mcg tablet, Take 75 mcg by mouth daily, Disp: , Rfl:     Multiple Vitamin (multivitamin) capsule, Take 1 capsule by mouth daily, Disp: , Rfl:     Vitals:   BP is 110/80 mmHg  HR 92/Min  BP Readings from Last 3 Encounters:   21 122/70   21 121/63   21 130/70       Physical Exam:  Neurologic:  Alert & oriented x 3, no new focal deficits, Not in any acute distress,  Constitutional:  Well developed, well nourished, non-toxic appearance   Eyes:  Pupil equal and reacting to light, conjunctiva normal,   HENT:  Atraumatic, oropharynx moist, Neck- normal range of motion, no tenderness,  Neck supple, No JVP, No LNP   Respiratory:  Bilateral air entry, mostly clear to auscultation  Cardiovascular: S1-S2 regular with a I/VI ejection systolic murmur   GI:  Soft, nondistended, normal bowel sounds, nontender, no hepatosplenomegaly appreciated  Musculoskeletal:  No edema, no tenderness, no deformities  Skin:  Well hydrated, no rash   Lymphatic:  No lymphadenopathy noted   Extremities:  No edema and distal pulses are present      Cardiac testing:   Results for orders placed during the hospital encounter of 21    Echo complete with contrast if indicated    [address]  [address] [facility] [address]  [phone]    Transthoracic Echocardiogram  2D, M-mode, Doppler, and Color Doppler    Study date:  2021    Patient: Roman Warren  MR number: [id number]  Account number: [de-identified]  : 1965  Age: 55 years  Gender: Female  Status: Outpatient  Location: Echo lab  Height: 66 in  Weight: 144 8 lb  BP: 122/ 70 mmHg    Indications: Palpitations      Diagnoses: R00 2 - Palpitations    Sonographer:  ТАТЬЯНА Lopez  Primary Physician:  Alejandro Coats MD  Referring Physician:  Lex Rodriguez MD  Group:  [facility]  Interpreting Physician:  Juanita Bennett MD    SUMMARY    SUMMARY:  Compared to prior echo, normal LV size and systolic function  No major wall motion abnormalities are noted  LEFT VENTRICLE:  Systolic function was at the lower limits of normal  Ejection fraction was estimated in the range of 55 % to 60 % to be 55 %  There were no regional wall motion abnormalities  MITRAL VALVE:  There was trace regurgitation  PULMONIC VALVE:  There was mild regurgitation  HISTORY: PRIOR HISTORY: Hashimoto's, Covid-19    PROCEDURE: The procedure was performed in the echo lab  This was a routine study  The transthoracic approach was used  The study included complete 2D imaging, M-mode, complete spectral Doppler, and color Doppler  The heart rate was 77 bpm,  at the start of the study  LEFT VENTRICLE: Size was normal  Systolic function was at the lower limits of normal  Ejection fraction was estimated in the range of 55 % to 60 % to be 55 %  There were no regional wall motion abnormalities  Wall thickness was normal  No  evidence of apical thrombus  DOPPLER: Left ventricular diastolic function parameters were normal     RIGHT VENTRICLE: The size was normal  Systolic function was normal with TAPSE-2 2cm Wall thickness was normal     LEFT ATRIUM: Size was normal     RIGHT ATRIUM: Size was normal     MITRAL VALVE: There was mild thickening  There was normal leaflet separation  DOPPLER: The transmitral velocity was within the normal range  There was no evidence for stenosis  There was trace regurgitation  AORTIC VALVE: The valve was probably trileaflet  Leaflets exhibited mildly increased thickness and normal cuspal separation  DOPPLER: Transaortic velocity was within the normal range  There was no evidence for stenosis  There was no  significant regurgitation      TRICUSPID VALVE: The valve structure was normal  There was normal leaflet separation  DOPPLER: The transtricuspid velocity was within the normal range  There was no evidence for stenosis  There was no significant regurgitation  PULMONIC VALVE: Leaflets exhibited normal thickness, no calcification, and normal cuspal separation  DOPPLER: The transpulmonic velocity was within the normal range  There was mild regurgitation  PERICARDIUM: There was no pericardial effusion  The pericardium was normal in appearance  AORTA: The root exhibited normal size  SYSTEMIC VEINS: IVC: The inferior vena cava was normal in size  SYSTEM MEASUREMENT TABLES    2D  EF (Teich): 54 75 %  %FS: 28 03 %  Ao Diam: 2 84 cm  EDV(Teich): 72 85 ml  ESV(Teich): 32 97 ml  IVSd: 0 76 cm  LA Area: 10 74 cm2  LA Diam: 2 77 cm  LVEDV MOD A4C: 62 8 ml  LVEF MOD A4C: 68 71 %  LVESV MOD A4C: 19 65 ml  LVIDd: 4 07 cm  LVIDs: 2 93 cm  LVLd A4C: 7 86 cm  LVLs A4C: 6 55 cm  LVPWd: 0 85 cm  RA Area: 11 73 cm2  RVIDd: 2 25 cm  SV (Teich): 39 88 ml  SV MOD A4C: 43 15 ml    CW  AV Env  Ti: 266 41 ms  AV VTI: 22 14 cm  AV Vmax: 1 24 m/s  AV Vmean: 0 83 m/s  AV maxP 19 mmHg  AV meanPG: 3 06 mmHg    MM  TAPSE: 2 24 cm    PW  MV E/A Ratio: 1 58  E' Sept: 0 13 m/s  E/E' Sept: 7 57  LVOT Env  Ti: 285 44 ms  LVOT VTI: 22 01 cm  LVOT Vmax: 1 12 m/s  LVOT Vmean: 0 77 m/s  LVOT maxP 02 mmHg  LVOT meanP 7 mmHg  MV A Sánchez: 0 64 m/s  MV Dec Camas: 5 2 m/s2  MV DecT: 195 58 ms  MV E Sánchez: 1 02 m/s  MV PHT: 56 72 ms  MVA By PHT: 3 88 cm2    [address] Accredited Echocardiography [facility]    Prepared and electronically signed by    Ene Castellanos MD  Signed 2021 10:53:07      Results for orders placed during the hospital encounter of 21    NM myocardial perfusion spect (stress and/or rest)    Narrative  [address]  [address] [facility]  [place] [phone]    Rest/Stress Gated SPECT Myocardial Perfusion Imaging After Exercise    Patient: Colton Antoine Fifi Prieto  MR number: [id number]  Account number: [de-identified]  : 1965  Age: 55 years  Gender: Female  Status: Outpatient  Location: Stress lab  Height: 66 in  Weight: 146 lb  BP: 110/ 80 mmHg    Allergies: NO KNOWN ALLERGIES    Diagnosis: 786 05 - SHORTNESS OF BREATH, R07 9 - Chest pain, unspecified    Primary Physician:  Parveen Blackwell MD  RN:  Hoda Rivero RN  Referring Physician:  Luis A Rojas MD  Group:  [facility]  Interpreting Physician:  Jasmin Castro MD    INDICATIONS: Assess for Coronary artery disease, SOB on exertion, Chest pain    HISTORY: The patient is a 55year old  female  Chest pain status: no chest pain  Coronary artery disease risk factors: dyslipidemia  Cardiovascular history: none significant  hypothyroidism Covid infection 2020  Medications: no cardiac drugs and thyroid medications  Previous test results: abnormal ECG and hyperlipidemia  PHYSICAL EXAM: Baseline physical exam screening: normal     REST ECG: Normal sinus rhythm  T wave inversions were shallow and observed in leads II, III and aVF  Nonspecific T wave abnormalities were present  PROCEDURE: The study was performed in the the Stress lab  Treadmill exercise testing was performed, using the Vasyl protocol  Gated SPECT myocardial perfusion imaging was performed after stress and at rest  Systolic blood pressure was 110  mmHg, at the start of the study  Diastolic blood pressure was 80 mmHg, at the start of the study  The heart rate was 79 bpm, at the start of the study  IV double checked  VASYL PROTOCOL:  HR bpm SBP mmHg DBP mmHg Symptoms Rhythm/conduct  Baseline 88 110 80 none NSR  Stage 1 113 130 80 none sinus tach  Stage 2 142 140 70 mild fatigue sinus tach, rare PVC's  Stage 3 153 160 90 mild dyspnea, mild fatigue sinus tach  Immediate 130 160 90 mild fatigue sinus tach  Recovery 1 108 158 80 none --  Recovery 2 95 142 85 none NSR  Recovery 3 90 138 80 none NSR  No medications or fluids given      STRESS SUMMARY: Duration of exercise was 8 min and 59 sec  The patient exercised to protocol stage 3  Maximal work rate was 10 1 METs  Maximal heart rate during stress was 157 bpm ( 95 % of maximal predicted heart rate)  The heart rate  response to stress was normal  There was normal resting blood pressure with an appropriate response to stress  The rate-pressure product for the peak heart rate and blood pressure was 72313  There was no chest pain during stress  The  stress test was terminated due to achievement of target heart rate and mild fatigue  Pre oxygen saturation: 100 %  Peak oxygen saturation: 100 %  Nonspecific resting ekg changes did not correlate with perfusion abnormalities  The stress  ECG was negative for st changes    ISOTOPE ADMINISTRATION:  Resting isotope administration Stress isotope administration  Agent Tetrofosmin Tetrofosmin  Dose 11 mCi 32 mCi  Date 06/16/2021 06/16/2021    The radiopharmaceutical was injected one minute before the end of exercise  MYOCARDIAL PERFUSION IMAGING:  The image quality was excellent  Rotating projection images reveal prone imaging completed for attenuation correction  Left ventricular size was normal  The TID ratio was 1 2  PERFUSION DEFECTS:  -  There were no perfusion defects  SUMMARY:  -  Stress results: Duration of exercise was 8 min and 59 sec  Target heart rate was achieved  Maximal systolic blood pressure during stress was 160 mmHg  There was no chest pain during stress  -  ECG conclusions: Nonspecific resting ekg changes did not correlate with perfusion abnormalities  The stress ECG was negative for st changes  -  Perfusion imaging: There were no perfusion defects   -  Impressions and recommendations: Normal study after maximal exercise  IMPRESSIONS: Normal study after maximal exercise  Myocardial perfusion imaging was normal at rest and with stress      Prepared and signed by    Robert Pendleton MD  Signed 06/17/2021 09:47:11    No results found for this or any previous visit  Imaging:  Chest X-Ray:   XR chest 2 views    Result Date: 5/27/2021  Impression No acute cardiopulmonary disease  Workstation performed: [id number]     XR chest pa & lateral    Result Date: 12/28/2020  Impression No acute cardiopulmonary disease  Workstation performed: [id number]       CT-scan of the chest:     No CTA results available for this patient  Lab Review   Lab Results   Component Value Date    WBC 7 14 05/27/2021    HGB 15 1 05/27/2021    HCT 46 8 (H) 05/27/2021    MCV 95 05/27/2021    RDW 12 3 05/27/2021     05/27/2021     BMP:  Lab Results   Component Value Date    SODIUM 143 05/27/2021    K 3 9 05/27/2021     05/27/2021    CO2 27 05/27/2021    ANIONGAP 7 02/11/2015    BUN 15 05/27/2021    CREATININE 0 84 05/27/2021    GLUC 124 05/27/2021    CALCIUM 9 0 05/27/2021    EGFR 78 05/27/2021     LFT:  Lab Results   Component Value Date    AST 19 05/27/2021    ALT 25 05/27/2021    ALKPHOS 88 05/27/2021    TP 8 0 05/27/2021    ALB 4 2 05/27/2021      No components found for: TSH3  No results found for: [facility]  Lab Results   Component Value Date    HGBA1C 5 4 11/07/2019     Lipid Profile:   Lab Results   Component Value Date    CHOLESTEROL 206 (H) 11/07/2019    HDL 67 11/07/2019    LDLCALC 123 (H) 11/07/2019    TRIG 72 11/07/2019     Lab Results   Component Value Date    CHOLESTEROL 206 (H) 11/07/2019     Lab Results   Component Value Date    TROPONINI <0 02 05/27/2021     No results found for: NTBNP   No results found for this or any previous visit (from the past 672 hour(s))  Dr Marci Everett MD, [facility]      "This note has been constructed using a voice recognition system  Therefore there may be syntax, spelling, and/or grammatical errors   Please call if you have any questions  "

## 2021-11-29 ENCOUNTER — OFFICE VISIT (OUTPATIENT)
Dept: FAMILY MEDICINE CLINIC | Facility: CLINIC | Age: 56
End: 2021-11-29
Payer: COMMERCIAL

## 2021-11-29 VITALS
RESPIRATION RATE: 16 BRPM | HEART RATE: 86 BPM | OXYGEN SATURATION: 98 % | BODY MASS INDEX: 22.5 KG/M2 | HEIGHT: 66 IN | WEIGHT: 140 LBS | DIASTOLIC BLOOD PRESSURE: 78 MMHG | SYSTOLIC BLOOD PRESSURE: 112 MMHG

## 2021-11-29 DIAGNOSIS — E78.2 MIXED HYPERLIPIDEMIA: ICD-10-CM

## 2021-11-29 DIAGNOSIS — Z13.6 SCREENING FOR CARDIOVASCULAR CONDITION: ICD-10-CM

## 2021-11-29 DIAGNOSIS — Z11.59 NEED FOR HEPATITIS C SCREENING TEST: ICD-10-CM

## 2021-11-29 DIAGNOSIS — Z13.1 SCREENING FOR DIABETES MELLITUS: ICD-10-CM

## 2021-11-29 DIAGNOSIS — R06.83 SNORING: ICD-10-CM

## 2021-11-29 DIAGNOSIS — Z00.00 ANNUAL PHYSICAL EXAM: Primary | ICD-10-CM

## 2021-11-29 DIAGNOSIS — G47.00 INSOMNIA, UNSPECIFIED TYPE: ICD-10-CM

## 2021-11-29 PROCEDURE — 3725F SCREEN DEPRESSION PERFORMED: CPT | Performed by: FAMILY MEDICINE

## 2021-11-29 PROCEDURE — 1036F TOBACCO NON-USER: CPT | Performed by: FAMILY MEDICINE

## 2021-11-29 PROCEDURE — 3008F BODY MASS INDEX DOCD: CPT | Performed by: FAMILY MEDICINE

## 2021-11-29 PROCEDURE — 99396 PREV VISIT EST AGE 40-64: CPT | Performed by: FAMILY MEDICINE

## 2021-12-03 ENCOUNTER — TELEPHONE (OUTPATIENT)
Dept: FAMILY MEDICINE CLINIC | Facility: CLINIC | Age: 56
End: 2021-12-03

## 2021-12-03 NOTE — TELEPHONE ENCOUNTER
Patient saw Dr Elmira Caba on 11/29 and requested a referral for a sleep study  She called and stated that the referral she received was actual for the sleep medicine physician who will not be available until July, and not the actual sleep study  Is it possible to give her a referral for the study?

## 2021-12-16 ENCOUNTER — TELEPHONE (OUTPATIENT)
Dept: SLEEP CENTER | Facility: CLINIC | Age: 56
End: 2021-12-16

## 2022-01-30 ENCOUNTER — APPOINTMENT (OUTPATIENT)
Dept: RADIOLOGY | Facility: CLINIC | Age: 57
End: 2022-01-30
Payer: COMMERCIAL

## 2022-01-30 ENCOUNTER — OFFICE VISIT (OUTPATIENT)
Dept: URGENT CARE | Facility: CLINIC | Age: 57
End: 2022-01-30
Payer: COMMERCIAL

## 2022-01-30 VITALS
DIASTOLIC BLOOD PRESSURE: 64 MMHG | WEIGHT: 141 LBS | HEIGHT: 66 IN | BODY MASS INDEX: 22.66 KG/M2 | HEART RATE: 77 BPM | TEMPERATURE: 97.1 F | OXYGEN SATURATION: 98 % | RESPIRATION RATE: 16 BRPM | SYSTOLIC BLOOD PRESSURE: 119 MMHG

## 2022-01-30 DIAGNOSIS — M79.671 RIGHT FOOT PAIN: ICD-10-CM

## 2022-01-30 DIAGNOSIS — S90.31XA CONTUSION OF RIGHT FOOT, INITIAL ENCOUNTER: Primary | ICD-10-CM

## 2022-01-30 PROCEDURE — 99213 OFFICE O/P EST LOW 20 MIN: CPT | Performed by: FAMILY MEDICINE

## 2022-01-30 PROCEDURE — 73630 X-RAY EXAM OF FOOT: CPT

## 2022-01-30 NOTE — PROGRESS NOTES
[facility]        NAME: Dottie Nichols is a 55 y o  female  : 1965    MRN: [id number]  DATE: 2022  TIME: 11:32 AM    Assessment and Plan   Contusion of right foot, initial encounter [S90 31XA]  1  Contusion of right foot, initial encounter  XR foot 3+ vw right         Patient Instructions     Patient Instructions   1  Contusion of the right foot  - xray of the right foot shows no acute abnormalities, awaiting official radiology read  - advised to wear an ace wrap for comfort and support  - patient is to rest the foot and keep it elevated  - apply ice to the site   - take Tylenol or Motrin as needed for pain   - advised to wear comfortable shoes with good support  - follow up w/ PCP for re-check in 3-5 days       Follow up with PCP in 3-5 days  Proceed to  ER if symptoms worsen  Chief Complaint     Chief Complaint   Patient presents with    Foot Pain     right foot pain and bruising on the outer aspect of foot, no injury, was walking a lot this week         History of Present Illness       56 yo female presents c/o R foot pain x 3 days  Patient states she was walking a lot at [employer] over this past week, however denies any specific injuries  She states the pain is located along the dorsal lateral aspect of the foot, and she feels it radiates up into the ankle  She is able to move the ankle joint in full ROM  No swelling, however she states there was some bruising present along the lateral foot which is significantly improved at this time  No numbness/tingling or weakness of the foot  Patient experiences pain with walking and bearing weight on the foot  She has applied ice to the site and has taken Ibuprofen for the pain  Review of Systems   Review of Systems   Constitutional: Negative  Respiratory: Negative  Cardiovascular: Negative  Musculoskeletal:        As noted in HPI   Skin:        As noted in HPI   Allergic/Immunologic: Negative  Neurological: Negative  Hematological: Negative  Current Medications       Current Outpatient Medications:     Calcium Carbonate (CALTRATE 600 PO), Take by mouth, Disp: , Rfl:     levothyroxine 75 mcg tablet, Take 75 mcg by mouth daily, Disp: , Rfl:     Multiple Vitamin (multivitamin) capsule, Take 1 capsule by mouth daily (Patient not taking: Reported on 2021), Disp: , Rfl:     Current Allergies     Allergies as of 2022    (No Known Allergies)            The following portions of the patient's history were reviewed and updated as appropriate: allergies, current medications, past family history, past medical history, past social history, past surgical history and problem list      Past Medical History:   Diagnosis Date    Allergic rhinitis     Resolved 2017     Cervical neuritis     Disease of thyroid gland     Hashimoto's thyroiditis     Migraine 2020    Daily headaches    Miscarriage 1996    Primary localized osteoarthritis of right knee 2018    Varicella     As a child    Vitamin D deficiency        Past Surgical History:   Procedure Laterality Date     SECTION      DILATION AND CURETTAGE OF UTERUS      KNEE SURGERY         Family History   Problem Relation Age of Onset    Hypertension Mother     Hyperlipidemia Mother     Osteoarthritis Mother     Thyroid disease Mother     Hyperthyroidism Mother     Asthma Mother     Heart attack Father     Heart failure Father          from heart attack  at age 68   [facility] No Known Problems Sister     No Known Problems Brother     No Known Problems Daughter     Heart block Son     Arthritis Family     Osteoporosis Family          Medications have been verified  Objective   /64   Pulse 77   Temp (!) 97 1 °F (36 2 °C)   Resp 16   Ht 5' 6" (1 676 m)   Wt 64 kg (141 lb)   LMP  (LMP Unknown)   SpO2 98%   BMI 22 76 kg/m²   No LMP recorded (lmp unknown)   Patient is postmenopausal        Physical Exam Physical Exam  Vitals and nursing note reviewed  Constitutional:       General: She is awake  She is not in acute distress  Appearance: Normal appearance  She is well-developed and well-groomed  She is not ill-appearing, toxic-appearing or diaphoretic  Cardiovascular:      Rate and Rhythm: Normal rate  Pulses: Normal pulses  Pulmonary:      Effort: Pulmonary effort is normal  No tachypnea, accessory muscle usage or respiratory distress  Musculoskeletal:      Comments: Right foot and ankle: no swelling, erythema, or bruising  Skin is appropriately warm and pink  No wounds  No tenderness to palpation  Ankle joint has full ROM, patient experiences pain in the lateral foot w/ movement  Good capillary refill  Strength and sensations intact  Normal gait  Skin:     General: Skin is warm and dry  Capillary Refill: Capillary refill takes less than 2 seconds  Coloration: Skin is not pale  Findings: No abrasion, bruising, ecchymosis, erythema, rash or wound  Neurological:      General: No focal deficit present  Mental Status: She is alert and oriented to person, place, and time  Mental status is at baseline  Psychiatric:         Attention and Perception: Attention and perception normal          Mood and Affect: Mood and affect normal          Speech: Speech normal          Behavior: Behavior normal  Behavior is cooperative  Thought Content:  Thought content normal          Cognition and Memory: Cognition and memory normal          Judgment: Judgment normal

## 2022-01-30 NOTE — PATIENT INSTRUCTIONS
1  Contusion of the right foot  - xray of the right foot shows no acute abnormalities, awaiting official radiology read  - advised to wear an ace wrap for comfort and support  - patient is to rest the foot and keep it elevated  - apply ice to the site   - take Tylenol or Motrin as needed for pain   - advised to wear comfortable shoes with good support  - follow up w/ PCP for re-check in 3-5 days

## 2022-02-08 ENCOUNTER — ANNUAL EXAM (OUTPATIENT)
Dept: OBGYN CLINIC | Facility: CLINIC | Age: 57
End: 2022-02-08
Payer: COMMERCIAL

## 2022-02-08 VITALS
HEIGHT: 66 IN | BODY MASS INDEX: 22.5 KG/M2 | DIASTOLIC BLOOD PRESSURE: 76 MMHG | WEIGHT: 140 LBS | SYSTOLIC BLOOD PRESSURE: 120 MMHG

## 2022-02-08 DIAGNOSIS — Z01.419 ENCOUNTER FOR GYNECOLOGICAL EXAMINATION (GENERAL) (ROUTINE) WITHOUT ABNORMAL FINDINGS: Primary | ICD-10-CM

## 2022-02-08 PROCEDURE — 99396 PREV VISIT EST AGE 40-64: CPT | Performed by: OBSTETRICS & GYNECOLOGY

## 2022-02-08 PROCEDURE — 3008F BODY MASS INDEX DOCD: CPT | Performed by: OBSTETRICS & GYNECOLOGY

## 2022-02-08 PROCEDURE — 1036F TOBACCO NON-USER: CPT | Performed by: OBSTETRICS & GYNECOLOGY

## 2022-02-09 NOTE — PROGRESS NOTES
ASSESSMENT & PLAN: Hector Crandall is a 55 y o  [id number] with normal gynecologic exam     1   Routine well woman exam done today  2    Pap and HPV:Pap with HPV was not done today  Current ASCCP Guidelines reviewed  Last Pap  [unfilled] :  no abnormalities  3  Mammogram scheduled  4  Vaginal atrophy, discussed  Offered vaginal estradiol for symptomatic treatment if needed in the future  Encouraged to call with any questions/ concerns  5  The following were reviewed in today's visit: breast self exam, use and side effects of HRT and menopause  6  Patient to return to office in 12 months for WWE  All questions have been answered to her satisfaction  CC:  Annual Gynecologic Examination    HPI: Hector Crandall is a 55 y o  [id number] who presents for annual gynecologic examination  She has the following concerns:  None  Pap is UTD per ASCCP guidelines  Denies postmenopausal bleeding  Past Medical History:   Diagnosis Date    Allergic rhinitis     Resolved 2017     Cervical neuritis     Disease of thyroid gland     Hashimoto's thyroiditis     Migraine 2020    Daily headaches    Miscarriage 1996    Primary localized osteoarthritis of right knee 2018    Varicella     As a child    Vitamin D deficiency        Past Surgical History:   Procedure Laterality Date     SECTION      DILATION AND CURETTAGE OF UTERUS      KNEE SURGERY         Past OB/Gyn History:   No LMP recorded (lmp unknown)  Patient is postmenopausal   Menstrual History:  OB History        3    Para   2    Term   2            AB   1    Living   2       SAB   1    IAB        Ectopic        Multiple        Live Births   2           Obstetric Comments   26 yo daughter at [employer] a job 2 days after graduation, 15 yo son -senior-with autoimmune and heart block; he is a runner and will go to states            No LMP recorded (lmp unknown)   Patient is postmenopausal        History of sexually transmitted infection No  Patient is not currently sexually active  heterosexual   Last Pap  [unfilled] :  no abnormalities  Family History   Problem Relation Age of Onset    Hypertension Mother     Hyperlipidemia Mother     Osteoarthritis Mother     Thyroid disease Mother     Hyperthyroidism Mother     Asthma Mother     Heart attack Father     Heart failure Father          from heart attack  at age 68   Xuan Toth No Known Problems Sister     No Known Problems Brother     No Known Problems Daughter     Heart block Son     Arthritis Family     Osteoporosis Family        Social History:  Social History     Socioeconomic History    Marital status: /Civil Union     Spouse name: Not on file    Number of children: 2    Years of education: Not on file    Highest education level: Bachelor's degree (e g , BA, AB, BS)   Occupational History    Occupation: RN, weekends    Tobacco Use    Smoking status: Never Smoker    Smokeless tobacco: Never Used   Vaping Use    Vaping Use: Never used   Substance and Sexual Activity    Alcohol use: No    Drug use: No    Sexual activity: Not Currently     Partners: Male     Comment: not sexually active and acceptable to both   Other Topics Concern    Not on file   Social History Narrative    Daily cola consumption (__cans/day)    Daily tea consumption (1 cup/day)    Does not exercise     x25 years     Social Determinants of Health     Financial Resource Strain: Not on file   Food Insecurity: Not on file   Transportation Needs: Not on file   Physical Activity: Not on file   Stress: Not on file   Social Connections: Not on file   Intimate Partner Violence: Not on file   Housing Stability: Not on file     Presently lives with partner  Patient is   Patient is currently employed RN    No Known Allergies    Current Outpatient Medications:     Calcium Carbonate (CALTRATE 600 PO), Take by mouth, Disp: , Rfl:     levothyroxine 75 mcg tablet, Take 75 mcg by mouth daily, Disp: , Rfl:     Multiple Vitamin (multivitamin) capsule, Take 1 capsule by mouth daily (Patient not taking: Reported on 9/1/2021), Disp: , Rfl:     Review of Systems:  Review of Systems   Constitutional: Negative for activity change, chills, fever and unexpected weight change  HENT: Negative for congestion, ear pain, hearing loss and sore throat  Respiratory: Negative for cough, chest tightness and shortness of breath  Cardiovascular: Negative for chest pain and leg swelling  Gastrointestinal: Negative for abdominal pain, constipation, diarrhea, nausea and vomiting  Genitourinary: Negative for difficulty urinating, dysuria, frequency, menstrual problem, pelvic pain, vaginal discharge and vaginal pain  Skin: Negative for color change and rash  Neurological: Negative for dizziness, numbness and headaches  Psychiatric/Behavioral: Negative for agitation and confusion  Physical Exam:  /76 (BP Location: Right arm, Patient Position: Sitting, Cuff Size: Standard)   Ht 5' 6" (1 676 m)   Wt 63 5 kg (140 lb)   LMP  (LMP Unknown)   BMI 22 60 kg/m²    Physical Exam  Constitutional:       General: She is not in acute distress  Appearance: Normal appearance  Genitourinary:      Vulva, bladder, rectum and urethral meatus normal       No lesions in the vagina  Right Labia: No rash, tenderness or lesions  Left Labia: No tenderness, lesions or rash  No labial fusion noted  No vaginal discharge or tenderness  No vaginal prolapse present  Severe vaginal atrophy present  Right Adnexa: not tender, not full and no mass present  Left Adnexa: not tender, not full and no mass present  No cervical motion tenderness or friability  Uterus is not enlarged or prolapsed  Breasts:      Breasts are soft  Right: No inverted nipple, mass, nipple discharge or skin change        Left: No inverted nipple, mass, nipple discharge or skin change  HENT:      Head: Normocephalic and atraumatic  Eyes:      Conjunctiva/sclera: Conjunctivae normal       Pupils: Pupils are equal, round, and reactive to light  Cardiovascular:      Rate and Rhythm: Normal rate and regular rhythm  Heart sounds: Normal heart sounds  Pulmonary:      Effort: Pulmonary effort is normal  No respiratory distress  Breath sounds: Normal breath sounds  No wheezing  Abdominal:      General: Abdomen is flat  There is no distension  Palpations: Abdomen is soft  Tenderness: There is no abdominal tenderness  There is no guarding  Musculoskeletal:         General: Normal range of motion  Cervical back: Normal range of motion and neck supple  Neurological:      General: No focal deficit present  Mental Status: She is alert and oriented to person, place, and time  Skin:     General: Skin is warm and dry  Psychiatric:         Mood and Affect: Mood normal          Behavior: Behavior normal          Thought Content: Thought content normal    Vitals and nursing note reviewed

## 2022-02-11 ENCOUNTER — HOSPITAL ENCOUNTER (OUTPATIENT)
Dept: SLEEP CENTER | Facility: CLINIC | Age: 57
Discharge: HOME | End: 2022-02-11
Payer: COMMERCIAL

## 2022-02-11 DIAGNOSIS — G47.00 INSOMNIA, UNSPECIFIED TYPE: ICD-10-CM

## 2022-02-11 DIAGNOSIS — R06.83 SNORING: ICD-10-CM

## 2022-02-11 PROCEDURE — 95810 POLYSOM 6/> YRS 4/> PARAM: CPT

## 2022-02-11 PROCEDURE — 95810 POLYSOM 6/> YRS 4/> PARAM: CPT | Performed by: INTERNAL MEDICINE

## 2022-02-12 NOTE — PROGRESS NOTES
Sleep Study Documentation    Pre-Sleep Study       Sleep testing procedure explained to patient:YES    Patient napped prior to study:  NO    Caffeine:Dayshift worker after 12PM   Caffeine use:NO    Alcohol:Dayshift workers after 5PM: Alcohol use:NO    Typical day for patient:YES       Study Documentation    Sleep Study Indications: Snore, Unfreshed Sleep    Sleep Study: Diagnostic   Snore: Moderate  Supplemental O2: no    O2 flow rate (L/min) range NA  O2 flow rate (L/min) final NA  Minimum SaO2 84  Baseline SaO2 95        Mode of Therapy: NA    EKG abnormalities: no     EEG abnormalities: no    Sleep Study Recorded < 2 hours: N/A    Sleep Study Recorded > 2 hours but incomplete study: N/A    Sleep Study Recorded 6 hours but no sleep obtained: NO    Patient classification: employed       Post-Sleep Study    Medication used at bedtime or during sleep study:NO    Patient reports time it took to fall asleep:30 to 60 minutes    Patient reports waking up during study:1 to 2 times  Patient reports returning to sleep without difficulty  Patient reports sleeping 6 to 8 hours without dreaming  Patient reports sleep during study:typical    Patient rated sleepiness: Not sleepy or tired    PAP treatment:no

## 2022-02-16 ENCOUNTER — TELEPHONE (OUTPATIENT)
Dept: SLEEP CENTER | Facility: CLINIC | Age: 57
End: 2022-02-16

## 2022-02-16 NOTE — TELEPHONE ENCOUNTER
Left message for the patient to call back for sleep study results     Mild DARON patient needs to schedule sleep consult

## 2022-02-18 NOTE — TELEPHONE ENCOUNTER
Spoke with the patient advised sleep study results  Patient wants to see Dr Vizcarra Friend in [place] or [place]     Phone number provided/ I was not able to schedule the patient

## 2022-02-22 ENCOUNTER — HOSPITAL ENCOUNTER (OUTPATIENT)
Dept: RADIOLOGY | Facility: HOSPITAL | Age: 57
Discharge: HOME | End: 2022-02-22
Payer: COMMERCIAL

## 2022-02-22 VITALS — BODY MASS INDEX: 22.02 KG/M2 | HEIGHT: 66 IN | WEIGHT: 137 LBS

## 2022-02-22 DIAGNOSIS — Z12.31 ENCOUNTER FOR SCREENING MAMMOGRAM FOR MALIGNANT NEOPLASM OF BREAST: ICD-10-CM

## 2022-02-22 PROCEDURE — 77067 SCR MAMMO BI INCL CAD: CPT

## 2022-02-22 PROCEDURE — 77063 BREAST TOMOSYNTHESIS BI: CPT

## 2022-03-24 ENCOUNTER — HOSPITAL ENCOUNTER (EMERGENCY)
Facility: HOSPITAL | Age: 57
Discharge: HOME | End: 2022-03-24
Attending: EMERGENCY MEDICINE | Admitting: EMERGENCY MEDICINE
Payer: COMMERCIAL

## 2022-03-24 ENCOUNTER — APPOINTMENT (EMERGENCY)
Dept: CT IMAGING | Facility: HOSPITAL | Age: 57
End: 2022-03-24
Payer: COMMERCIAL

## 2022-03-24 VITALS
HEART RATE: 74 BPM | SYSTOLIC BLOOD PRESSURE: 132 MMHG | TEMPERATURE: 97.9 F | DIASTOLIC BLOOD PRESSURE: 63 MMHG | OXYGEN SATURATION: 99 % | RESPIRATION RATE: 16 BRPM

## 2022-03-24 DIAGNOSIS — R00.2 PALPITATION: ICD-10-CM

## 2022-03-24 DIAGNOSIS — R07.89 CHEST TIGHTNESS: ICD-10-CM

## 2022-03-24 DIAGNOSIS — R42 VERTIGO: Primary | ICD-10-CM

## 2022-03-24 LAB
ALBUMIN SERPL BCP-MCNC: 3.9 G/DL (ref 3.5–5)
ALP SERPL-CCNC: 85 U/L (ref 46–116)
ALT SERPL W P-5'-P-CCNC: 30 U/L (ref 12–78)
ANION GAP SERPL CALCULATED.3IONS-SCNC: 7 MMOL/L (ref 4–13)
AST SERPL W P-5'-P-CCNC: 17 U/L (ref 5–45)
ATRIAL RATE: 67 BPM
BASOPHILS # BLD MANUAL: 0 THOUSAND/UL (ref 0–0.1)
BASOPHILS NFR MAR MANUAL: 0 % (ref 0–1)
BILIRUB SERPL-MCNC: 0.33 MG/DL (ref 0.2–1)
BUN SERPL-MCNC: 17 MG/DL (ref 5–25)
CALCIUM SERPL-MCNC: 9.4 MG/DL (ref 8.3–10.1)
CARDIAC TROPONIN I PNL SERPL HS: <2 NG/L
CARDIAC TROPONIN I PNL SERPL HS: <2 NG/L
CHLORIDE SERPL-SCNC: 105 MMOL/L (ref 100–108)
CO2 SERPL-SCNC: 28 MMOL/L (ref 21–32)
CREAT SERPL-MCNC: 0.71 MG/DL (ref 0.6–1.3)
D DIMER PPP FEU-MCNC: 2.56 UG/ML FEU
EOSINOPHIL # BLD MANUAL: 0.06 THOUSAND/UL (ref 0–0.4)
EOSINOPHIL NFR BLD MANUAL: 1 % (ref 0–6)
ERYTHROCYTE [DISTWIDTH] IN BLOOD BY AUTOMATED COUNT: 12.4 % (ref 11.6–15.1)
GFR SERPL CREATININE-BSD FRML MDRD: 96 ML/MIN/1.73SQ M
GLUCOSE SERPL-MCNC: 146 MG/DL (ref 65–140)
HCT VFR BLD AUTO: 45.5 % (ref 34.8–46.1)
HGB BLD-MCNC: 14.8 G/DL (ref 11.5–15.4)
LYMPHOCYTES # BLD AUTO: 1.4 THOUSAND/UL (ref 0.6–4.47)
LYMPHOCYTES # BLD AUTO: 23 % (ref 14–44)
MCH RBC QN AUTO: 31.1 PG (ref 26.8–34.3)
MCHC RBC AUTO-ENTMCNC: 32.5 G/DL (ref 31.4–37.4)
MCV RBC AUTO: 96 FL (ref 82–98)
MONOCYTES # BLD AUTO: 0.24 THOUSAND/UL (ref 0–1.22)
MONOCYTES NFR BLD: 4 % (ref 4–12)
NEUTROPHILS # BLD MANUAL: 4.39 THOUSAND/UL (ref 1.85–7.62)
NEUTS SEG NFR BLD AUTO: 72 % (ref 43–75)
P AXIS: 65 DEGREES
PLATELET # BLD AUTO: 330 THOUSANDS/UL (ref 149–390)
PLATELET BLD QL SMEAR: ADEQUATE
PMV BLD AUTO: 9.4 FL (ref 8.9–12.7)
POTASSIUM SERPL-SCNC: 4.4 MMOL/L (ref 3.5–5.3)
PR INTERVAL: 162 MS
PROT SERPL-MCNC: 7.3 G/DL (ref 6.4–8.2)
QRS AXIS: 50 DEGREES
QRSD INTERVAL: 78 MS
QT INTERVAL: 400 MS
QTC INTERVAL: 422 MS
RBC # BLD AUTO: 4.76 MILLION/UL (ref 3.81–5.12)
RBC MORPH BLD: NORMAL
SODIUM SERPL-SCNC: 140 MMOL/L (ref 136–145)
T WAVE AXIS: 24 DEGREES
TSH SERPL DL<=0.05 MIU/L-ACNC: 0.8 UIU/ML (ref 0.36–3.74)
VENTRICULAR RATE: 67 BPM
WBC # BLD AUTO: 6.1 THOUSAND/UL (ref 4.31–10.16)

## 2022-03-24 PROCEDURE — 84443 ASSAY THYROID STIM HORMONE: CPT | Performed by: PHYSICIAN ASSISTANT

## 2022-03-24 PROCEDURE — 85027 COMPLETE CBC AUTOMATED: CPT | Performed by: PHYSICIAN ASSISTANT

## 2022-03-24 PROCEDURE — 93005 ELECTROCARDIOGRAM TRACING: CPT

## 2022-03-24 PROCEDURE — 96375 TX/PRO/DX INJ NEW DRUG ADDON: CPT

## 2022-03-24 PROCEDURE — 99284 EMERGENCY DEPT VISIT MOD MDM: CPT | Performed by: PHYSICIAN ASSISTANT

## 2022-03-24 PROCEDURE — 99284 EMERGENCY DEPT VISIT MOD MDM: CPT

## 2022-03-24 PROCEDURE — 80053 COMPREHEN METABOLIC PANEL: CPT | Performed by: PHYSICIAN ASSISTANT

## 2022-03-24 PROCEDURE — 85379 FIBRIN DEGRADATION QUANT: CPT | Performed by: PHYSICIAN ASSISTANT

## 2022-03-24 PROCEDURE — G1004 CDSM NDSC: HCPCS

## 2022-03-24 PROCEDURE — 85007 BL SMEAR W/DIFF WBC COUNT: CPT | Performed by: PHYSICIAN ASSISTANT

## 2022-03-24 PROCEDURE — 36415 COLL VENOUS BLD VENIPUNCTURE: CPT | Performed by: PHYSICIAN ASSISTANT

## 2022-03-24 PROCEDURE — 96374 THER/PROPH/DIAG INJ IV PUSH: CPT

## 2022-03-24 PROCEDURE — 96361 HYDRATE IV INFUSION ADD-ON: CPT

## 2022-03-24 PROCEDURE — 71275 CT ANGIOGRAPHY CHEST: CPT

## 2022-03-24 PROCEDURE — 84484 ASSAY OF TROPONIN QUANT: CPT | Performed by: PHYSICIAN ASSISTANT

## 2022-03-24 PROCEDURE — 93010 ELECTROCARDIOGRAM REPORT: CPT | Performed by: INTERNAL MEDICINE

## 2022-03-24 RX ORDER — ONDANSETRON 2 MG/ML
4 INJECTION INTRAMUSCULAR; INTRAVENOUS ONCE
Status: COMPLETED | OUTPATIENT
Start: 2022-03-24 | End: 2022-03-24

## 2022-03-24 RX ORDER — MECLIZINE HYDROCHLORIDE 25 MG/1
25 TABLET ORAL 3 TIMES DAILY PRN
Qty: 30 TABLET | Refills: 0 | Status: SHIPPED | OUTPATIENT
Start: 2022-03-24 | End: 2022-04-11

## 2022-03-24 RX ORDER — MECLIZINE HCL 12.5 MG/1
25 TABLET ORAL ONCE
Status: COMPLETED | OUTPATIENT
Start: 2022-03-24 | End: 2022-03-24

## 2022-03-24 RX ORDER — METOCLOPRAMIDE HYDROCHLORIDE 5 MG/ML
10 INJECTION INTRAMUSCULAR; INTRAVENOUS ONCE
Status: COMPLETED | OUTPATIENT
Start: 2022-03-24 | End: 2022-03-24

## 2022-03-24 RX ADMIN — ONDANSETRON 4 MG: 2 INJECTION INTRAMUSCULAR; INTRAVENOUS at 12:24

## 2022-03-24 RX ADMIN — IOHEXOL 85 ML: 350 INJECTION, SOLUTION INTRAVENOUS at 14:06

## 2022-03-24 RX ADMIN — METOCLOPRAMIDE HYDROCHLORIDE 10 MG: 5 INJECTION INTRAMUSCULAR; INTRAVENOUS at 13:38

## 2022-03-24 RX ADMIN — SODIUM CHLORIDE 1000 ML: 0.9 INJECTION, SOLUTION INTRAVENOUS at 12:25

## 2022-03-24 RX ADMIN — MECLIZINE 25 MG: 12.5 TABLET ORAL at 12:24

## 2022-03-24 NOTE — ED NOTES
Patient discharged  Instructions review  Patient verbalized understanding  IV removed  Catheter intact  Patient Stable upon departure  Ambulated independently out of ED        Nancy Dupree RN  03/24/22 5901

## 2022-03-24 NOTE — ED PROVIDER NOTES
History  Chief Complaint   Patient presents with    Dizziness     patient states had sudden onset of dizziness in the past hour  now nausea and chest tightness      The patient is a 61-year-old female with history of thyroid disease who presents to the emergency department for evaluation of chest tightness, palpitations, shortness of breath and dizziness  The patient reports that she has been having cardiac symptoms over the last couple of weeks including intermittent palpitations, intermittent sharp pains in her chest and some dyspnea on exertion  She states as of today, she developed sudden onset vertigo  She describes it as a feeling of being off balance  Turning her head significantly worsens the symptoms  She states that she has had vertigo in the past, but she is generally able to find a position that is comfortable for her  She does not feel she is currently able to find a comfortable position  There is associated nausea and chest tightness today  She has not followed up with her doctor since the symptoms initially started  She denies fever, chills, abdominal pain, leg swelling, vomiting, diarrhea, rash, headache or weakness  History provided by:  Patient   used: No    Dizziness  Associated symptoms: chest pain, nausea, palpitations and shortness of breath    Associated symptoms: no diarrhea, no headaches and no vomiting        Prior to Admission Medications   Prescriptions Last Dose Informant Patient Reported? Taking?    Calcium Carbonate (CALTRATE 600 PO)   Yes No   Sig: Take by mouth   Multiple Vitamin (multivitamin) capsule   Yes No   Sig: Take 1 capsule by mouth daily   Patient not taking: Reported on 9/1/2021   levothyroxine 75 mcg tablet   Yes No   Sig: Take 75 mcg by mouth daily      Facility-Administered Medications: None       Past Medical History:   Diagnosis Date    Allergic rhinitis     Resolved 8/23/2017     Cervical neuritis     Disease of thyroid gland  Hashimoto's thyroiditis     Migraine 2020    Daily headaches    Miscarriage 1996    Primary localized osteoarthritis of right knee 2018    Varicella     As a child    Vitamin D deficiency        Past Surgical History:   Procedure Laterality Date     SECTION      DILATION AND CURETTAGE OF UTERUS  1996    KNEE SURGERY         Family History   Problem Relation Age of Onset    Hypertension Mother     Hyperlipidemia Mother     Osteoarthritis Mother     Thyroid disease Mother     Hyperthyroidism Mother     Asthma Mother     Heart attack Father     Heart failure Father          from heart attack  at age 68   Mavis Cousin No Known Problems Sister     No Known Problems Brother     No Known Problems Daughter     Heart block Son     Arthritis Family     Osteoporosis Family      I have reviewed and agree with the history as documented  E-Cigarette/Vaping    E-Cigarette Use Never User      E-Cigarette/Vaping Substances    Nicotine No     THC No     CBD No     Flavoring No     Other No     Unknown No      Social History     Tobacco Use    Smoking status: Never Smoker    Smokeless tobacco: Never Used   Vaping Use    Vaping Use: Never used   Substance Use Topics    Alcohol use: No    Drug use: No       Review of Systems   Constitutional: Negative for chills and fever  HENT: Negative for ear pain and sore throat  Eyes: Negative for redness and visual disturbance  Respiratory: Positive for chest tightness and shortness of breath  Negative for cough  Cardiovascular: Positive for chest pain and palpitations  Gastrointestinal: Positive for nausea  Negative for abdominal pain, diarrhea and vomiting  Genitourinary: Negative for dysuria and hematuria  Musculoskeletal: Positive for back pain  Negative for neck pain and neck stiffness  Skin: Negative for color change and rash  Neurological: Positive for dizziness  Negative for light-headedness and headaches  All other systems reviewed and are negative  Physical Exam  Physical Exam  Vitals and nursing note reviewed  Constitutional:       General: She is not in acute distress  Appearance: She is well-developed  She is not ill-appearing or toxic-appearing  HENT:      Head: Normocephalic and atraumatic  Mouth/Throat:      Pharynx: Uvula midline  Eyes:      General: Lids are normal       Conjunctiva/sclera: Conjunctivae normal    Cardiovascular:      Rate and Rhythm: Normal rate and regular rhythm  Heart sounds: Normal heart sounds  Pulmonary:      Effort: Pulmonary effort is normal       Breath sounds: Normal breath sounds  Abdominal:      General: There is no distension  Palpations: Abdomen is soft  Tenderness: There is no abdominal tenderness  Musculoskeletal:      Cervical back: Normal range of motion and neck supple  Skin:     General: Skin is warm and dry  Neurological:      Mental Status: She is alert and oriented to person, place, and time  Cranial Nerves: Cranial nerves are intact  Sensory: Sensation is intact  Motor: Motor function is intact  Coordination: Coordination is intact           Vital Signs  ED Triage Vitals [03/24/22 1106]   Temperature Pulse Respirations Blood Pressure SpO2   97 9 °F (36 6 °C) 83 18 146/68 99 %      Temp Source Heart Rate Source Patient Position - Orthostatic VS BP Location FiO2 (%)   Oral Monitor Sitting Left arm --      Pain Score       No Pain           Vitals:    03/24/22 1106 03/24/22 1300 03/24/22 1516   BP: 146/68 132/63    Pulse: 83 86 74   Patient Position - Orthostatic VS: Sitting           Visual Acuity      ED Medications  Medications   ondansetron (ZOFRAN) injection 4 mg (4 mg Intravenous Given 3/24/22 1224)   meclizine (ANTIVERT) tablet 25 mg (25 mg Oral Given 3/24/22 1224)   sodium chloride 0 9 % bolus 1,000 mL (0 mL Intravenous Stopped 3/24/22 1516)   metoclopramide (REGLAN) injection 10 mg (10 mg Intravenous Given 3/24/22 1338)   iohexol (OMNIPAQUE) 350 MG/ML injection (SINGLE-DOSE) 85 mL (85 mL Intravenous Given 3/24/22 1406)       Diagnostic Studies  Results Reviewed     Procedure Component Value Units Date/Time    HS Troponin I 2hr [[id number]] Collected: 03/24/22    Lab Status: Final result Specimen: Blood Updated: 03/24/22 1614     hs TnI 2hr <2 ng/L      Delta 2hr hsTnI --    HS Troponin 0hr (reflex protocol) [[id number]]  (Normal) Collected: 03/24/22 1207    Lab Status: Final result Specimen: Blood from Arm, Right Updated: 03/24/22 1307     hs TnI 0hr <2 ng/L     CBC and differential [[id number]]  (Normal) Collected: 03/24/22 1208    Lab Status: Final result Specimen: Blood from Arm, Right Updated: 03/24/22 1254     WBC 6 10 Thousand/uL      RBC 4 76 Million/uL      Hemoglobin 14 8 g/dL      Hematocrit 45 5 %      MCV 96 fL      MCH 31 1 pg      MCHC 32 5 g/dL      RDW 12 4 %      MPV 9 4 fL      Platelets 000 Thousands/uL     Narrative: This is an appended report  These results have been appended to a previously verified report      Manual Differential(PHLEBS Do Not Order) [[id number]] Collected: 03/24/22 1208    Lab Status: Final result Specimen: Blood from Arm, Right Updated: 03/24/22 1254     Segmented % 72 %      Lymphocytes % 23 %      Monocytes % 4 %      Eosinophils, % 1 %      Basophils % 0 %      Absolute Neutrophils 4 39 Thousand/uL      Lymphocytes Absolute 1 40 Thousand/uL      Monocytes Absolute 0 24 Thousand/uL      Eosinophils Absolute 0 06 Thousand/uL      Basophils Absolute 0 00 Thousand/uL      Total Counted --     RBC Morphology Normal     Platelet Estimate Adequate    TSH [[id number]]  (Normal) Collected: 03/24/22 1207    Lab Status: Final result Specimen: Blood from Arm, Right Updated: 03/24/22 1252     TSH 3RD GENERATON 0 797 uIU/mL     Narrative:      Patients undergoing fluorescein dye angiography may retain small amounts of fluorescein in the body for 48-72 hours post procedure  Samples containing fluorescein can produce falsely depressed TSH values  If the patient had this procedure,a specimen should be resubmitted post fluorescein clearance  Comprehensive metabolic panel [[id number]]  (Abnormal) Collected: 03/24/22 1207    Lab Status: Final result Specimen: Blood from Arm, Right Updated: 03/24/22 1249     Sodium 140 mmol/L      Potassium 4 4 mmol/L      Chloride 105 mmol/L      CO2 28 mmol/L      ANION GAP 7 mmol/L      BUN 17 mg/dL      Creatinine 0 71 mg/dL      Glucose 146 mg/dL      Calcium 9 4 mg/dL      AST 17 U/L      ALT 30 U/L      Alkaline Phosphatase 85 U/L      Total Protein 7 3 g/dL      Albumin 3 9 g/dL      Total Bilirubin 0 33 mg/dL      eGFR 96 ml/min/1 73sq m     Narrative:      [facility] guidelines for Chronic Kidney Disease (CKD):     Stage 1 with normal or high GFR (GFR > 90 mL/min/1 73 square meters)    Stage 2 Mild CKD (GFR = 60-89 mL/min/1 73 square meters)    Stage 3A Moderate CKD (GFR = 45-59 mL/min/1 73 square meters)    Stage 3B Moderate CKD (GFR = 30-44 mL/min/1 73 square meters)    Stage 4 Severe CKD (GFR = 15-29 mL/min/1 73 square meters)    Stage 5 End Stage CKD (GFR <15 mL/min/1 73 square meters)  Note: GFR calculation is accurate only with a steady state creatinine    D-Dimer [[id number]]  (Abnormal) Collected: 03/24/22 1207    Lab Status: Final result Specimen: Blood from Arm, Right Updated: 03/24/22 1233     D-Dimer, Quant 2 56 ug/ml FEU     Narrative: In the evaluation for possible pulmonary embolism, in the appropriate (Well's Score of 4 or less) patient, the age adjusted d-dimer cutoff for this patient can be calculated as:    Age x 0 01 (in ug/mL) for Age-adjusted D-dimer exclusion threshold for a patient over 50 years  CTA ED chest PE study   Final Result by Ryan Barillas MD (03/24 3674)      No CT evidence of pulmonary embolism  No acute thoracic pathology  Cholelithiasis  Workstation performed: [id number]                    Procedures  ECG 12 Lead Documentation Only    Date/Time: 3/24/2022 4:47 PM  Performed by: Jose Sanford PA-C  Authorized by: Suzan Field PA-C     Comments:      Normal sinus rhythm at 67  Normal axis  No acute ST-T changes  Improvement from previous EKG done in June of 2021  ED Course  ED Course as of 03/24/22 1658   Thu Mar 24, 2022   1325 D-Dimer, Quant(!): 2 56   1325 Discussed all results with the patient  PE study ordered  Patient acknowledges understanding  She reports nausea has significantly improved, but she states the dizziness has not improved  Reglan ordered  [phone] Patient reports significant improvement of vertigo, although she does reports some ongoing vertigo with certain positions  However, she feels that she could manage it at this time  Will send 2nd troponin   1634 Patient is reporting significant improvement of her to go at this time  She is ambulating around her room without issue  MDM  Number of Diagnoses or Management Options  Chest tightness: new and requires workup  Palpitation: new and requires workup  Vertigo: new and requires workup  Diagnosis management comments: Patient presents for evaluation of multiple symptoms including chest tightness, nausea and dizziness  Patient is also been experiencing intermittent palpitations and experiencing shortness of breath with exertion  Differential includes but is not limited to BPPV versus Meniere's disease versus labyrinthitis versus anxiety versus PE versus arrhythmia versus ACS versus neurogenic syncope  On exam, the patient is neurologically intact  I have a high suspicion for peripheral vertigo for central vertigo at this time  Patient also does have a history of vertigo previously  Labs ordered and reviewed  Notable for elevated D-dimer otherwise unremarkable    PE study was ordered after results were discussed with the patient  She is agreeable to going for further testing at this time  The patient reported very little relief of vertigo after the meclizine, but she did report significant relief of nausea after the Zofran  Did ultimately order some Reglan, which did give her significant relief  PE study was reviewed with no acute findings  All results were discussed with the patient  Patient was able to get up and ambulate without issue following the Reglan  Cardiac workup is unremarkable today  Referral was placed for patient to  and wear Holter monitor, and she was given follow-up with Cardiology  Regarding the vertigo, the highest suspicion remains for BPPV at this time  Patient was prescribed a course of meclizine to take as needed, and I encouraged follow-up with her primary care doctor regarding the vertigo  Patient was given strict return to ED precautions for any new or significantly worsening symptoms  She acknowledged understanding  Patient is stable for discharge           Amount and/or Complexity of Data Reviewed  Clinical lab tests: ordered and reviewed  Tests in the radiology section of CPT®: ordered and reviewed  Decide to obtain previous medical records or to obtain history from someone other than the patient: yes  Review and summarize past medical records: yes    Risk of Complications, Morbidity, and/or Mortality  Presenting problems: low  Diagnostic procedures: low  Management options: low    Patient Progress  Patient progress: improved      Disposition  Final diagnoses:   Vertigo   Palpitation   Chest tightness     Time reflects when diagnosis was documented in both MDM as applicable and the Disposition within this note     Time User Action Codes Description Comment    3/24/2022  4:32 PM Carolina Garay Add [R42] Vertigo     3/24/2022  4:32 PM Carolina Garay Add [R00 2] Palpitation     3/24/2022  4:32 PM Carolina Garay Add [R07 89] Chest tightness       ED Disposition     ED Disposition Condition Date/Time Comment    Discharge Stable Thu Mar 24, 2022  4:32 PM Courtney Singer discharge to home/self care  Follow-up Information     Follow up With Specialties Details Why Contact Info Additional Information    Tray Hylton MD Family Medicine Schedule an appointment as soon as possible for a visit in 3 days  [address] [place] [address]  [phone]       [facility] Schedule an appointment as soon as possible for a visit in 1 week  [facility] [address] [place] [address] Cardiology [address] [facility], [place], [place] [facility] [place], [address] [facility], [address] Emergency Department Emergency Medicine  If symptoms worsen [address] Emergency Department, [address], [place] [facility] [place], [address] [facility], [address]          Discharge Medication List as of 3/24/2022  4:34 PM      START taking these medications    Details   meclizine (ANTIVERT) 25 mg tablet Take 1 tablet (25 mg total) by mouth 3 (three) times a day as needed for dizziness, Starting Thu 3/24/2022, Normal         CONTINUE these medications which have NOT CHANGED    Details   Calcium Carbonate (CALTRATE 600 PO) Take by mouth, Historical Med      levothyroxine 75 mcg tablet Take 75 mcg by mouth daily, Starting Fri 4/9/2021, Historical Med      Multiple Vitamin (multivitamin) capsule Take 1 capsule by mouth daily, Historical Med             Outpatient Discharge Orders   Holter monitor   Standing Status: Future Standing Exp   Date: 06/24/22       PDMP Review     None          ED Provider  Electronically Signed by           Marychuy Johnson PA-C  03/24/22 5124

## 2022-03-28 ENCOUNTER — HOSPITAL ENCOUNTER (OUTPATIENT)
Dept: NON INVASIVE DIAGNOSTICS | Facility: CLINIC | Age: 57
Discharge: HOME | End: 2022-03-28
Payer: COMMERCIAL

## 2022-03-28 DIAGNOSIS — R00.2 PALPITATION: ICD-10-CM

## 2022-03-28 LAB
CHOLEST SERPL-MCNC: 220 MG/DL
CHOLEST/HDLC SERPL: 3.6 (CALC)
HBA1C MFR BLD: 5.3 % OF TOTAL HGB
HCV AB S/CO SERPL IA: <0.02
HCV AB SERPL QL IA: NORMAL
HDLC SERPL-MCNC: 61 MG/DL
LDLC SERPL CALC-MCNC: 143 MG/DL (CALC)
NONHDLC SERPL-MCNC: 159 MG/DL (CALC)
TRIGL SERPL-MCNC: 70 MG/DL

## 2022-03-28 PROCEDURE — 93226 XTRNL ECG REC<48 HR SCAN A/R: CPT

## 2022-03-28 PROCEDURE — 93225 XTRNL ECG REC<48 HRS REC: CPT

## 2022-04-06 PROCEDURE — 93227 XTRNL ECG REC<48 HR R&I: CPT | Performed by: INTERNAL MEDICINE

## 2022-04-12 ENCOUNTER — CONSULT (OUTPATIENT)
Dept: PULMONOLOGY | Facility: CLINIC | Age: 57
End: 2022-04-12
Payer: COMMERCIAL

## 2022-04-12 ENCOUNTER — OFFICE VISIT (OUTPATIENT)
Dept: FAMILY MEDICINE CLINIC | Facility: CLINIC | Age: 57
End: 2022-04-12
Payer: COMMERCIAL

## 2022-04-12 VITALS
WEIGHT: 140 LBS | RESPIRATION RATE: 16 BRPM | TEMPERATURE: 98.3 F | SYSTOLIC BLOOD PRESSURE: 108 MMHG | OXYGEN SATURATION: 98 % | HEIGHT: 66 IN | HEART RATE: 80 BPM | BODY MASS INDEX: 22.5 KG/M2 | DIASTOLIC BLOOD PRESSURE: 68 MMHG

## 2022-04-12 VITALS
RESPIRATION RATE: 16 BRPM | DIASTOLIC BLOOD PRESSURE: 72 MMHG | HEART RATE: 85 BPM | HEIGHT: 66 IN | SYSTOLIC BLOOD PRESSURE: 118 MMHG | WEIGHT: 141 LBS | BODY MASS INDEX: 22.66 KG/M2 | OXYGEN SATURATION: 99 %

## 2022-04-12 DIAGNOSIS — Z86.16 HISTORY OF COVID-19: Primary | ICD-10-CM

## 2022-04-12 DIAGNOSIS — I49.1 PREMATURE ATRIAL COMPLEX: ICD-10-CM

## 2022-04-12 DIAGNOSIS — E06.3 HASHIMOTO'S THYROIDITIS: ICD-10-CM

## 2022-04-12 DIAGNOSIS — F41.9 ANXIETY: Primary | ICD-10-CM

## 2022-04-12 DIAGNOSIS — G47.33 OSA (OBSTRUCTIVE SLEEP APNEA): ICD-10-CM

## 2022-04-12 DIAGNOSIS — E78.2 MIXED HYPERLIPIDEMIA: ICD-10-CM

## 2022-04-12 DIAGNOSIS — R06.02 SOB (SHORTNESS OF BREATH) ON EXERTION: ICD-10-CM

## 2022-04-12 PROBLEM — Z20.822 PERSON UNDER INVESTIGATION FOR COVID-19: Status: RESOLVED | Noted: 2020-12-13 | Resolved: 2022-04-12

## 2022-04-12 PROBLEM — R51.9 PERSISTENT HEADACHES: Status: RESOLVED | Noted: 2019-11-18 | Resolved: 2022-04-12

## 2022-04-12 PROCEDURE — 94618 PULMONARY STRESS TESTING: CPT | Performed by: INTERNAL MEDICINE

## 2022-04-12 PROCEDURE — 3725F SCREEN DEPRESSION PERFORMED: CPT | Performed by: FAMILY MEDICINE

## 2022-04-12 PROCEDURE — 99204 OFFICE O/P NEW MOD 45 MIN: CPT | Performed by: INTERNAL MEDICINE

## 2022-04-12 PROCEDURE — 3008F BODY MASS INDEX DOCD: CPT | Performed by: INTERNAL MEDICINE

## 2022-04-12 PROCEDURE — 99214 OFFICE O/P EST MOD 30 MIN: CPT | Performed by: FAMILY MEDICINE

## 2022-04-12 PROCEDURE — 1036F TOBACCO NON-USER: CPT | Performed by: FAMILY MEDICINE

## 2022-04-12 RX ORDER — PAROXETINE HYDROCHLORIDE 12.5 MG/1
12.5 TABLET, FILM COATED, EXTENDED RELEASE ORAL EVERY MORNING
Qty: 30 TABLET | Refills: 0 | Status: SHIPPED | OUTPATIENT
Start: 2022-04-12 | End: 2022-04-26 | Stop reason: ALTCHOICE

## 2022-04-12 RX ORDER — MULTIVITAMIN WITH IRON
TABLET ORAL
COMMUNITY
Start: 2022-04-01

## 2022-04-12 NOTE — ASSESSMENT & PLAN NOTE
Since she had COVID for over 1 year ,she has symptoms of intermittent palpitations dizziness  Gets short of breath easily, she was seen in the ER CT scan of the chest is normal she was seen the pulmonologist and going for pulmonary rehab, I discussed with her that there is also possibility of high anxiety, we can try and anxiety medication will start her on Paxil and will follow-up in 2 weeks, she agrees

## 2022-04-12 NOTE — PROGRESS NOTES
Assessment/Plan:    Problem List Items Addressed This Visit        Endocrine    Hashimoto's thyroiditis     She follows endocrinologist on levothyroxine            Cardiovascular and Mediastinum    Premature atrial complex       Other    Mixed hyperlipidemia    Anxiety - Primary     Since she had COVID for over 1 year ,she has symptoms of intermittent palpitations dizziness  Gets short of breath easily, she was seen in the ER CT scan of the chest is normal she was seen the pulmonologist and going for pulmonary rehab, I discussed with her that there is also possibility of high anxiety, we can try and anxiety medication will start her on Paxil and will follow-up in 2 weeks, she agrees         Relevant Medications    PARoxetine (PAXIL-CR) 12 5 mg 24 hr tablet    SOB (shortness of breath) on exertion          Return in about 10 days (around 4/22/2022)  Chief Complaint   Patient presents with    Follow-up       Subjective:   Patient ID: Rico Treviño is a 55 y o  female  She says she has been feeling unwell since she had COVID in 2020, since then she has intermittent shortness of breath on exertion, she gets irregular heartbeat feeling or palpitation,  Dizzy intermittently  She also says she had some loss in the family few months ago, she has been worked up in the [place] [id number] by pulmonologist and no cause has been noted, she also had a cardiac stress test which was negative    HPI    Review of Systems   Constitutional: Negative for activity change, appetite change, chills, fatigue, fever and unexpected weight change  HENT: Negative for congestion, ear discharge, ear pain, nosebleeds, postnasal drip, rhinorrhea, sinus pressure, sneezing, sore throat, trouble swallowing and voice change  Eyes: Negative for photophobia, pain, discharge, redness and itching  Respiratory: Negative for cough, chest tightness, shortness of breath and wheezing  Cardiovascular: Negative for chest pain, palpitations and leg swelling  Gastrointestinal: Negative for abdominal pain, constipation, diarrhea, nausea and vomiting  Endocrine: Negative for polyuria  Genitourinary: Negative for dysuria, frequency and urgency  Musculoskeletal: Negative for arthralgias, back pain, myalgias and neck pain  Skin: Negative for color change, pallor and rash  Allergic/Immunologic: Negative for environmental allergies and food allergies  Neurological: Negative for dizziness, weakness, light-headedness and headaches  Hematological: Negative for adenopathy  Does not bruise/bleed easily  Psychiatric/Behavioral: Negative for behavioral problems and sleep disturbance  The patient is not nervous/anxious  Objective:  Physical Exam  Vitals and nursing note reviewed  Constitutional:       Appearance: She is well-developed  She is not ill-appearing  HENT:      Head: Normocephalic and atraumatic  Right Ear: External ear normal       Left Ear: External ear normal    Eyes:      General: No scleral icterus  Conjunctiva/sclera: Conjunctivae normal       Pupils: Pupils are equal, round, and reactive to light  Neck:      Thyroid: No thyromegaly  Cardiovascular:      Rate and Rhythm: Normal rate and regular rhythm  Heart sounds: Normal heart sounds  No murmur heard  Pulmonary:      Effort: Pulmonary effort is normal       Breath sounds: Normal breath sounds  No wheezing or rales  Musculoskeletal:      Cervical back: Normal range of motion and neck supple  Right lower leg: No edema  Left lower leg: No edema  Lymphadenopathy:      Cervical: No cervical adenopathy  Skin:     Findings: No erythema or rash  Neurological:      General: No focal deficit present  Mental Status: She is alert     Psychiatric:         Mood and Affect: Mood normal             Past Surgical History:   Procedure Laterality Date     SECTION      DILATION AND CURETTAGE OF UTERUS  1996    KNEE SURGERY         Family History Problem Relation Age of Onset    Hypertension Mother     Hyperlipidemia Mother     Osteoarthritis Mother     Thyroid disease Mother     Hyperthyroidism Mother     Asthma Mother     Heart attack Father     Heart failure Father          from heart attack  at age 68   [facility] No Known Problems Sister     No Known Problems Brother     No Known Problems Daughter     Heart block Son     Arthritis Family     Osteoporosis Family          Current Outpatient Medications:     Cholecalciferol (Vitamin D-3) 125 MCG (5000 UT) TABS, , Disp: , Rfl:     levothyroxine 75 mcg tablet, Take 75 mcg by mouth daily, Disp: , Rfl:     pyridoxine (VITAMIN B6) 100 mg tablet, , Disp: , Rfl:     Calcium Carbonate (CALTRATE 600 PO), Take by mouth (Patient not taking: Reported on 2022 ), Disp: , Rfl:     PARoxetine (PAXIL-CR) 12 5 mg 24 hr tablet, Take 1 tablet (12 5 mg total) by mouth every morning, Disp: 30 tablet, Rfl: 0    No Known Allergies    Vitals:    22 1303   BP: 118/72   Pulse: 85   Resp: 16   SpO2: 99%   Weight: 64 kg (141 lb)   Height: 5' 6" (1 676 m)

## 2022-04-12 NOTE — PROGRESS NOTES
Pulmonary Outpatient Consultation Note   Karen Underwodo 55 y o  female MRN: [id number]  4/12/2022      Referring Physician: Yong Rubio MD    Reason for Consultation: Ab Jimenes, DARON      Assessment/Plan:    History of COVID-19  · Tested positive for COVID in late 2020  · CTA imaging from 3/24 does not show evidence of PE and no evidence of lung parenchymal disease  · 6WMT int he office today showed that Clayton [place] 80 walked 414m with no evidence of exertional hypoxia  · Recommend that Maryam continue with trying to improve her conditioning, recommend pulmonary rehab which I have placed the order for    DARON (obstructive sleep apnea)  · Diagnostic sleep study did show evidence of mild sleep apnea, was recommended that she have an in-lab sleep study for PAP titration, order placed for this    Health Maintenance  Immunization History   Administered Date(s) Administered    COVID-19 PFIZER VACCINE 0 3 ML IM 03/23/2021, 04/14/2021    DT (pediatric) 07/24/2007    INFLUENZA 09/02/2019, 09/30/2020, 09/30/2020, 11/18/2021    Influenza, seasonal, injectable 10/16/2010    Influenza, seasonal, injectable, preservative free 10/13/2012    Measles 10/04/1999    Tdap 01/02/2012    Tuberculin Skin Test-PPD Intradermal 09/20/1999     Return in about 3 months (around 7/12/2022)  History of Present Illness   HPI:  Karen Underwood is a 55 y o  female presents today with complaints of shortness of breath, dizziness which began after her COVID-19 infection 2020  Maryam states that overall she was feeling well up until she was diagnosed with COVID in late 2020  She does not have any previous lung history and is a nonsmoker  She says that since then, she has been having issues with intermittent shortness of breath on exertion, fatigue as well as episodes of dizziness  She is seen multiple physicians for these complaints  In a few weeks ago, she had to present to the emergency room because she was having significant pains in her shoulder  She did undergo a diagnostic sleep study which does show evidence of mild sleep apnea and she was recommended to undergo a in-lab PAP titration study  In terms of pulmonary history, Vining states that she was never previously diagnosed with asthma, COPD, emphysema  She is a lifelong nonsmoker  She does not have any occupational exposures  She is not currently on any respiratory medications  Review of Systems   Constitutional: Negative for activity change, chills, fever and unexpected weight change  HENT: Negative for congestion, rhinorrhea and voice change  Respiratory: Positive for shortness of breath  Negative for cough, chest tightness and wheezing  Cardiovascular: Negative for chest pain and palpitations  Gastrointestinal: Negative for abdominal distention, constipation, diarrhea, nausea and vomiting  Endocrine: Negative for cold intolerance and heat intolerance  Genitourinary: Negative for dysuria, frequency and urgency  Musculoskeletal: Negative for arthralgias, joint swelling and myalgias  Skin: Negative for color change, pallor and rash  Neurological: Negative for dizziness, weakness and numbness  Psychiatric/Behavioral: Negative for agitation and confusion  The patient is not nervous/anxious          Historical Information   Past Medical History:   Diagnosis Date    Allergic rhinitis     Resolved 2017     Cervical neuritis     Disease of thyroid gland     Hashimoto's thyroiditis     Migraine 2020    Daily headaches    Miscarriage 1996    Primary localized osteoarthritis of right knee 2018    Varicella     As a child    Vitamin D deficiency      Past Surgical History:   Procedure Laterality Date     SECTION      DILATION AND CURETTAGE OF UTERUS  1996    KNEE SURGERY       Family History   Problem Relation Age of Onset    Hypertension Mother     Hyperlipidemia Mother     Osteoarthritis Mother     Thyroid disease Mother    [facility]eciliar[facility]ny Cannon Hyperthyroidism Mother     Asthma Mother     Heart attack Father     Heart failure Father          from heart attack  at age 68   [facility] No Known Problems Sister     No Known Problems Brother     No Known Problems Daughter     Heart block Son     Arthritis Family     Osteoporosis Family        Occupational History:  Works as pediatric nurse    Meds/Allergies     Current Outpatient Medications:     Cholecalciferol (Vitamin D-3) 125 MCG (5000 UT) TABS, , Disp: , Rfl:     levothyroxine 75 mcg tablet, Take 75 mcg by mouth daily, Disp: , Rfl:     pyridoxine (VITAMIN B6) 100 mg tablet, , Disp: , Rfl:     Calcium Carbonate (CALTRATE 600 PO), Take by mouth (Patient not taking: Reported on 2022 ), Disp: , Rfl:   No Known Allergies    Vitals: Blood pressure 108/68, pulse 80, temperature 98 3 °F (36 8 °C), temperature source Tympanic, resp  rate 16, height 5' 6" (1 676 m), weight 63 5 kg (140 lb), SpO2 98 %  Body mass index is 22 6 kg/m²  Oxygen Therapy  SpO2: 98 %    Physical Exam  Vitals reviewed  Constitutional:       Appearance: Normal appearance  She is not ill-appearing or toxic-appearing  HENT:      Head: Normocephalic and atraumatic  Nose: Nose normal  No congestion or rhinorrhea  Mouth/Throat:      Mouth: Mucous membranes are moist       Pharynx: Oropharynx is clear  Cardiovascular:      Rate and Rhythm: Normal rate and regular rhythm  Pulses: Normal pulses  Heart sounds: Normal heart sounds  Pulmonary:      Effort: Pulmonary effort is normal  No respiratory distress  Breath sounds: Normal breath sounds  No wheezing, rhonchi or rales  Abdominal:      General: Abdomen is flat  Bowel sounds are normal  There is no distension  Palpations: Abdomen is soft  Tenderness: There is no abdominal tenderness  There is no guarding  Musculoskeletal:         General: No swelling or tenderness  Normal range of motion        Cervical back: Normal range of motion and neck supple  Right lower leg: No edema  Left lower leg: No edema  Skin:     General: Skin is warm and dry  Findings: No rash  Neurological:      General: No focal deficit present  Mental Status: She is alert and oriented to person, place, and time  Mental status is at baseline  Psychiatric:         Mood and Affect: Mood normal          Behavior: Behavior normal          Labs: I have personally reviewed pertinent lab results  Lab Results   Component Value Date    WBC 6 10 03/24/2022    HGB 14 8 03/24/2022    HCT 45 5 03/24/2022    MCV 96 03/24/2022     03/24/2022     Lab Results   Component Value Date    GLUCOSE 103 02/11/2015    CALCIUM 9 4 03/24/2022     09/12/2017    K 4 4 03/24/2022    CO2 28 03/24/2022     03/24/2022    BUN 17 03/24/2022    CREATININE 0 71 03/24/2022     No results found for: IGE  Lab Results   Component Value Date    ALT 30 03/24/2022    AST 17 03/24/2022    ALKPHOS 85 03/24/2022    BILITOT 0 6 09/12/2017       Imaging and other studies: I have personally reviewed pertinent reports  and I have personally reviewed pertinent films in PACS      Pulmonary function testing:   No PFTs in system    EKG, Pathology, and Other Studies: I have personally reviewed pertinent reports  and I have personally reviewed pertinent films in PACS      [facility]

## 2022-04-22 ENCOUNTER — TELEPHONE (OUTPATIENT)
Dept: FAMILY MEDICINE CLINIC | Facility: CLINIC | Age: 57
End: 2022-04-22

## 2022-04-22 NOTE — TELEPHONE ENCOUNTER
Pt wanted to make Dr Lucy Reza aware that the medication that was prescribed on 4/11- PARoxetine is making her feel worse so she stopped taking it  She did not want to discuss alternative medications  She also stated she has an apt  With the Cardiologist in a few weeks and will discuss the issue with them

## 2022-05-06 ENCOUNTER — TELEPHONE (OUTPATIENT)
Dept: CARDIAC REHAB | Facility: CLINIC | Age: 57
End: 2022-05-06

## 2022-05-06 ENCOUNTER — TRANSCRIBE ORDERS (OUTPATIENT)
Dept: CARDIAC REHAB | Facility: CLINIC | Age: 57
End: 2022-05-06

## 2022-05-06 DIAGNOSIS — U09.9 POST-ACUTE SEQUELAE OF COVID-19 (PASC): Primary | ICD-10-CM

## 2022-05-13 ENCOUNTER — CLINICAL SUPPORT (OUTPATIENT)
Dept: PULMONOLOGY | Facility: CLINIC | Age: 57
End: 2022-05-13
Payer: COMMERCIAL

## 2022-05-13 DIAGNOSIS — Z86.16 HISTORY OF COVID-19: ICD-10-CM

## 2022-05-13 DIAGNOSIS — U09.9 POST-ACUTE SEQUELAE OF COVID-19 (PASC): Primary | ICD-10-CM

## 2022-05-13 NOTE — PROGRESS NOTES
Pulmonary Rehabilitation Plan of Care   Initial Care Plan      Today's date: 2022   # of Exercise Sessions Completed: 1- Initial Evaluation  Patient name: Elaine Miller      : 1965  Age: 65 y o  MRN: [id number]  Referring Physician: Marietta Crigler, MD  Pulmonologist: Aure White MD   Provider: [facility]  Clinician: Al Naranjo MS    Dx:   Encounter Diagnoses   Name Primary?  History of COVID-19     Post-acute sequelae of COVID-19 (PASC)      Date of onset: 2020      SUMMARY OF PROGRESS:  Today is Mayram initial evaluation to begin Pulmonary Rehab for the diagnosis of Post- acute sequelae of COVID-19  Since their diagnosis, the patient has been experiencing increased dyspnea, increased fatigue, weakness and chest tightness  They report dyspnea and chest pain when completing ADLs   The patient currently does not follow a formal exercise program at home  Depression screening using the PHQ-9 interprets the patient's score 1-4 = Minimal Depression  MARIO-7 screening tool for anxiety suggests 0-4  = Not anxious  When addressed, the patient admits to having depression/anxiety, due to daughter moving to [facility],  needing surgery, and selling her mothers house  Patient reports excellent social/emotional support  Information to begin using [employer] was provided as well as contact information for counseling through [employer]  PHQ-9 score will be reassessed in 30 days  The patient is a non-smoker  Patient admits to 100% medication compliance  At rest, the patient rated dyspnea 1/10 with SpO2 97% on room air  They completed an initial 6MWT, walking 1,380 ft on room air  The patients rating of perceived dyspnea during the 6MWT was 4/10 with SpO2 96%  Patient took 0 rest breaks  Telemetry revealed NSR  Resting  /90 with appropriate hemodynamic response to exercise reaching 110/74  Patient will exercise on room air   Education on smoking cessation, oxygen use, breathing techniques, pulmonary anatomy, exercise for the pulmonary patient, healthy eating, stress, and relaxation will be provided  Patient goals include: get back to being able to do regular activities without heart racing and be SOB, go on a trip without being worried about holding other back when with a group people  Maryam will attend 24 exercise sessions, 2x/wk for 12-18 weeks beginning Monday May 16th, at 1pm   Will progress patient as tolerated over the next 30 days  Medication compliance: Yes   Comments: Pt reports to be compliant with medications  Fall Risk: Low   Comments: Ambulates with a steady gait with no assist device    Smoking: Never used    RPD at Rest: 1/10  RPD with Exercise:  4/10    Assessment of progression of lung disease and functional status:  Shortness of breath questionnaire: 28/120      EXERCISE ASSESSMENT and PLAN    Current Exercise Program in Rehab:       Frequency: 2 days/week        Minutes: 35-45         METS: 2 8-3 5               SpO2: >90%              RPD: 4-6                      HR: RHR +30    RPE: 4-5         Modalities: Treadmill, UBE, Lifecycle and NuStep      Exercise Progression 30 Day Goals :    Frequency: 2 days/week        Minutes: 45-60         METS: 3 0-4 0              SpO2: >90%              RPD: 3-5                      HR: RHR +30   RPE: 4-5        Modalities: Treadmill, Airdyne bike, Lifecycle and Elliptical     Strength training:   Will be added following 2-3 weeks of monitored exercise sessions   Modalities: Leg Press, Chest Press, Pull Downs, Arm Curl and Seated Row    Oxygen Needs: on room air at rest  Oxygen Goal: Maintain SpO2>90% during exercise    Home Exercise: none  Education: pursed lipped breathing, diaphragmatic breathing, relaxation breathing, home exercise and appropriate O2 response to exercise    Goals: Improved 6MW distance by 10% and reduced RPD at rest  Progressing:  Reviewed Pt goals and determined plan of care, Will continue to educate and progress as tolerated  Plan: learn to conserve energy with ADLs , practice breathing techniques 3x/day and reduce time sitting at home    Readiness to change: Preparation:  (Getting ready to change)       NUTRITION ASSESSMENT AND PLAN    Weight control:    Starting weight: 142   Current weight:       Diabetes: N/A    Goals:LDL <100, HDL >40, TRG <150, CHOL <200, choose lean meat (93-95%), eliminate processed meats, reduce portion sizes of meat to 3oz or less, increase intake of fish, shellfish and cook without added fat or use vegetable oil/spray  Education: heart healthy eating  low sodium diet  hydration  Progressing:Reviewed Pt goals and determined plan of care, Will continue to educate and progress as tolerated  Plan: avoid processed foods, will try new grains like brown rice, quinoa, farro, will replace sugar sweetened cereals with whole grain or oatmeal, drink more water, learn how to read food labels, replace sugar with stevia or truvia and keep added daily sugar <25g/day  Readiness to change: Action:  (Changing behavior)      PSYCHOSOCIAL ASSESSMENT AND PLAN    Emotional:  Depression assessment:  PHQ-9 = 1-4 = Minimal Depression            Anxiety measure:  MARIO-7 = 0-4  = Not anxious  Self-reported stress level: 8   Social support: Excellent    Goals:  Physical Fitness in [facility] Score < 3, increased energy and take time to relax  Education: signs/sxs of depression, benefits of a positive support system and stress management techniques    Progressing:Reviewed Pt goals and determined plan of care, Will continue to educate and progress as tolerated    Plan: Class: Stress and Your Health, Practice relaxation techniques, Exercise, Spend time outdoors, Enjoy a hobby and Read a Book  Readiness to change: Preparation:  (Getting ready to change)       OTHER CORE COMPONENTS     Tobacco:   Social History     Tobacco Use   Smoking Status Never Smoker   Smokeless Tobacco Never Used       Tobacco Use Intervention: Referral to tobacco expert:   N/A:  Patient is a non-smoker     Blood pressure:    Restin/90   Exercise: 110/74    Goals: consistent BP < 130/80, reduced dietary sodium <2300mg and moderate intensity exercise >150 mins/wk  Education:  pathophysiology of pulmonary disease  Progressing:Reviewed Pt goals and determined plan of care, Will continue to educate and progress as tolerated    Plan: Avoid Processed foods, engage in regular exercise and eliminate salt shaker at the table  Readiness to change: Preparation:  (Getting ready to change)

## 2022-05-13 NOTE — PROGRESS NOTES
PULMONARY REHAB ASSESSMENT    Today's date: May 13, 2022  Patient name: Dalila Ruiz     : 1965       MRN: [id number]  PCP: Vasquez Demarco MD  Referring Physician: Patricia Roy MD  Pulmonologist: Dr Kalpesh Bernstein     Dx: Post-acute sequelae of COVID-19 (U09 9)      Date of onset: 2020  Cultural needs: n/a     Weight:    Wt Readings from Last 1 Encounters:   22 64 kg (141 lb)      Height:   Ht Readings from Last 1 Encounters:   22 5' 6" (1 676 m)     Medical History:   Past Medical History:   Diagnosis Date    Allergic rhinitis     Resolved 2017     Cervical neuritis     Disease of thyroid gland     Hashimoto's thyroiditis     Migraine 2020    Daily headaches    Miscarriage     Primary localized osteoarthritis of right knee 2018    Varicella     As a child    Vitamin D deficiency          Physical Limitations: possible sciatic nerve damage, numbness through hands and feet      Oxygen needs: n/a     History of Toxic Exposure:  None    Risk Factors   Cholesterol: No  Smoking: Never used  HTN: No  DM: No  Obesity: No   Inactivity: No  Stress:  perceived  stress: 8/10   Stressors: daughter moving to [facility],  surgery, sold Mothers house, work PEDS nurse   Goals for Stress Management: Exercise more, maintain healthy relationship with your stress     Family History:  Family History   Problem Relation Age of Onset    Hypertension Mother     Hyperlipidemia Mother     Osteoarthritis Mother     Thyroid disease Mother     Hyperthyroidism Mother     Asthma Mother     Heart attack Father     Heart failure Father          from heart attack  at age 68   [facility] [place] No Known Problems Sister     No Known Problems Brother     No Known Problems Daughter     Heart block Son     Arthritis Family     Osteoporosis Family        Allergies: Patient has no known allergies    ETOH:   Social History     Substance and Sexual Activity   Alcohol Use No         Current Medications:   Current Outpatient Medications   Medication Sig Dispense Refill    Calcium Carbonate (CALTRATE 600 PO) Take by mouth (Patient not taking: Reported on 4/12/2022 )      Cholecalciferol (Vitamin D-3) 125 MCG (5000 UT) TABS       levothyroxine 75 mcg tablet Take 75 mcg by mouth daily      pyridoxine (VITAMIN B6) 100 mg tablet        No current facility-administered medications for this visit  Functional Status Prior to Diagnosis for Treatment   Occupation: part time job pediatric nurse   Recreation: walk with friends  ADLs: No limitations  Beasley: No limitations  Exercise: no formal exercise   Other: n/a     Current Functional Status  Occupation: part time job pediatric nurse   Recreation: garden, shopping   ADLs:No limitations, can do everything, but needs to take breaks   Beasley: No limitations able to perform self-care  Exercise: no formal exercise   Other: n/a     Patient Specific Goals:  Get back to being able to do regular activities without heart racing and be SOB, go on a trip without being worried about holding other back when with a group people    Short Term Program Goals: dietary modifications increased strength improved energy/stamina with ADLs exercise 120-150 mins/wk    Long Term Goals: increased maximal walking duration  increased intial training workload  Improved [facility] Activity Status score  Improved lipid profile  Reduced stress    Oxygen Goals: Maintain SpO2>90% titrating supplemental oxygen as needed     Ability to reach goals/rehabilitation potential:  Excellent    Projected return to function: 12 weeks  Objective tests: 6 MWT      Nutritional   Reviewed details of Rate your Plate  Discussed key elements of heart healthy eating  Reviewed patient goals for dietary modifications and their clinical implications  Reviewed most recent lipid profile       Goals for dietary modification: choose lean cuts of meat  poultry without the skin  low fat ground meat and poultry  eliminate processed meats  reduce portions of meat to 3 oz  increase fish intake  more meatless meals  low fat dairy   reduced fat cheese  increase whole grains  increase fruits and vegetables  eliminate butter  low sodium  improved snack choices  more nuts/seeds  reduce sweets/frozen desserts  heathier choices while dining out      Emotional/Social  Patient reports he/she is coping well with good social support and denies depression or anxiety    SOCIAL SUPPORT NETWORK    Marital status:       Domestic Violence Screening: No    Comments: hx  DARON, PAC, PVC, R knee osteoarthritis, COVID-19 (12/2020), SOB on exertion

## 2022-05-16 ENCOUNTER — APPOINTMENT (OUTPATIENT)
Dept: PULMONOLOGY | Facility: CLINIC | Age: 57
End: 2022-05-16
Payer: COMMERCIAL

## 2022-05-16 ENCOUNTER — APPOINTMENT (EMERGENCY)
Dept: RADIOLOGY | Facility: HOSPITAL | Age: 57
End: 2022-05-16
Payer: COMMERCIAL

## 2022-05-16 ENCOUNTER — APPOINTMENT (EMERGENCY)
Dept: CT IMAGING | Facility: HOSPITAL | Age: 57
End: 2022-05-16
Payer: COMMERCIAL

## 2022-05-16 ENCOUNTER — HOSPITAL ENCOUNTER (EMERGENCY)
Facility: HOSPITAL | Age: 57
Discharge: HOME | End: 2022-05-16
Attending: EMERGENCY MEDICINE
Payer: COMMERCIAL

## 2022-05-16 VITALS
DIASTOLIC BLOOD PRESSURE: 65 MMHG | HEART RATE: 70 BPM | OXYGEN SATURATION: 99 % | SYSTOLIC BLOOD PRESSURE: 148 MMHG | TEMPERATURE: 98.8 F | RESPIRATION RATE: 18 BRPM

## 2022-05-16 DIAGNOSIS — K80.20 CHOLELITHIASIS: ICD-10-CM

## 2022-05-16 DIAGNOSIS — R07.9 CHEST PAIN: Primary | ICD-10-CM

## 2022-05-16 DIAGNOSIS — M25.512 LEFT SHOULDER PAIN: ICD-10-CM

## 2022-05-16 LAB
ALBUMIN SERPL BCP-MCNC: 4.4 G/DL (ref 3.5–5)
ALP SERPL-CCNC: 70 U/L (ref 34–104)
ALT SERPL W P-5'-P-CCNC: 19 U/L (ref 7–52)
ANION GAP SERPL CALCULATED.3IONS-SCNC: 7 MMOL/L (ref 4–13)
APTT PPP: 30 SECONDS (ref 23–37)
AST SERPL W P-5'-P-CCNC: 20 U/L (ref 13–39)
BASOPHILS # BLD AUTO: 0.05 THOUSANDS/ΜL (ref 0–0.1)
BASOPHILS NFR BLD AUTO: 1 % (ref 0–1)
BILIRUB SERPL-MCNC: 0.47 MG/DL (ref 0.2–1)
BNP SERPL-MCNC: 28 PG/ML (ref 0–100)
BUN SERPL-MCNC: 15 MG/DL (ref 5–25)
CALCIUM SERPL-MCNC: 9.9 MG/DL (ref 8.4–10.2)
CARDIAC TROPONIN I PNL SERPL HS: <2 NG/L
CARDIAC TROPONIN I PNL SERPL HS: <2 NG/L
CHLORIDE SERPL-SCNC: 104 MMOL/L (ref 96–108)
CO2 SERPL-SCNC: 28 MMOL/L (ref 21–32)
CREAT SERPL-MCNC: 0.81 MG/DL (ref 0.6–1.3)
D DIMER PPP FEU-MCNC: 2.53 UG/ML FEU
EOSINOPHIL # BLD AUTO: 0.1 THOUSAND/ΜL (ref 0–0.61)
EOSINOPHIL NFR BLD AUTO: 1 % (ref 0–6)
ERYTHROCYTE [DISTWIDTH] IN BLOOD BY AUTOMATED COUNT: 12.4 % (ref 11.6–15.1)
GFR SERPL CREATININE-BSD FRML MDRD: 81 ML/MIN/1.73SQ M
GLUCOSE SERPL-MCNC: 83 MG/DL (ref 65–140)
HCT VFR BLD AUTO: 45.9 % (ref 34.8–46.1)
HGB BLD-MCNC: 15.2 G/DL (ref 11.5–15.4)
IMM GRANULOCYTES # BLD AUTO: 0.02 THOUSAND/UL (ref 0–0.2)
IMM GRANULOCYTES NFR BLD AUTO: 0 % (ref 0–2)
INR PPP: 0.92 (ref 0.84–1.19)
LYMPHOCYTES # BLD AUTO: 2.7 THOUSANDS/ΜL (ref 0.6–4.47)
LYMPHOCYTES NFR BLD AUTO: 33 % (ref 14–44)
MAGNESIUM SERPL-MCNC: 2.2 MG/DL (ref 1.9–2.7)
MCH RBC QN AUTO: 31.3 PG (ref 26.8–34.3)
MCHC RBC AUTO-ENTMCNC: 33.1 G/DL (ref 31.4–37.4)
MCV RBC AUTO: 95 FL (ref 82–98)
MONOCYTES # BLD AUTO: 0.62 THOUSAND/ΜL (ref 0.17–1.22)
MONOCYTES NFR BLD AUTO: 8 % (ref 4–12)
NEUTROPHILS # BLD AUTO: 4.64 THOUSANDS/ΜL (ref 1.85–7.62)
NEUTS SEG NFR BLD AUTO: 57 % (ref 43–75)
NRBC BLD AUTO-RTO: 0 /100 WBCS
PLATELET # BLD AUTO: 345 THOUSANDS/UL (ref 149–390)
PMV BLD AUTO: 9.3 FL (ref 8.9–12.7)
POTASSIUM SERPL-SCNC: 4 MMOL/L (ref 3.5–5.3)
PROT SERPL-MCNC: 7.4 G/DL (ref 6.4–8.4)
PROTHROMBIN TIME: 12.3 SECONDS (ref 11.6–14.5)
RBC # BLD AUTO: 4.85 MILLION/UL (ref 3.81–5.12)
SODIUM SERPL-SCNC: 139 MMOL/L (ref 135–147)
WBC # BLD AUTO: 8.13 THOUSAND/UL (ref 4.31–10.16)

## 2022-05-16 PROCEDURE — 83735 ASSAY OF MAGNESIUM: CPT | Performed by: PHYSICIAN ASSISTANT

## 2022-05-16 PROCEDURE — 99285 EMERGENCY DEPT VISIT HI MDM: CPT | Performed by: PHYSICIAN ASSISTANT

## 2022-05-16 PROCEDURE — 85025 COMPLETE CBC W/AUTO DIFF WBC: CPT | Performed by: PHYSICIAN ASSISTANT

## 2022-05-16 PROCEDURE — 71275 CT ANGIOGRAPHY CHEST: CPT

## 2022-05-16 PROCEDURE — 84484 ASSAY OF TROPONIN QUANT: CPT | Performed by: PHYSICIAN ASSISTANT

## 2022-05-16 PROCEDURE — 93005 ELECTROCARDIOGRAM TRACING: CPT

## 2022-05-16 PROCEDURE — 85379 FIBRIN DEGRADATION QUANT: CPT | Performed by: PHYSICIAN ASSISTANT

## 2022-05-16 PROCEDURE — 96374 THER/PROPH/DIAG INJ IV PUSH: CPT

## 2022-05-16 PROCEDURE — 36415 COLL VENOUS BLD VENIPUNCTURE: CPT | Performed by: PHYSICIAN ASSISTANT

## 2022-05-16 PROCEDURE — G1004 CDSM NDSC: HCPCS

## 2022-05-16 PROCEDURE — 85730 THROMBOPLASTIN TIME PARTIAL: CPT | Performed by: PHYSICIAN ASSISTANT

## 2022-05-16 PROCEDURE — 80053 COMPREHEN METABOLIC PANEL: CPT | Performed by: PHYSICIAN ASSISTANT

## 2022-05-16 PROCEDURE — 71045 X-RAY EXAM CHEST 1 VIEW: CPT

## 2022-05-16 PROCEDURE — 83880 ASSAY OF NATRIURETIC PEPTIDE: CPT | Performed by: PHYSICIAN ASSISTANT

## 2022-05-16 PROCEDURE — 85610 PROTHROMBIN TIME: CPT | Performed by: PHYSICIAN ASSISTANT

## 2022-05-16 PROCEDURE — 99285 EMERGENCY DEPT VISIT HI MDM: CPT

## 2022-05-16 RX ORDER — LIDOCAINE 50 MG/G
1 PATCH TOPICAL ONCE
Status: DISCONTINUED | OUTPATIENT
Start: 2022-05-16 | End: 2022-05-16 | Stop reason: HOSPADM

## 2022-05-16 RX ORDER — LIDOCAINE 50 MG/G
1 PATCH TOPICAL DAILY
Qty: 15 PATCH | Refills: 0 | Status: SHIPPED | OUTPATIENT
Start: 2022-05-16

## 2022-05-16 RX ORDER — KETOROLAC TROMETHAMINE 30 MG/ML
15 INJECTION, SOLUTION INTRAMUSCULAR; INTRAVENOUS ONCE
Status: COMPLETED | OUTPATIENT
Start: 2022-05-16 | End: 2022-05-16

## 2022-05-16 RX ADMIN — IOHEXOL 70 ML: 350 INJECTION, SOLUTION INTRAVENOUS at 15:32

## 2022-05-16 RX ADMIN — LIDOCAINE 5% 1 PATCH: 700 PATCH TOPICAL at 16:54

## 2022-05-16 RX ADMIN — KETOROLAC TROMETHAMINE 15 MG: 30 INJECTION, SOLUTION INTRAMUSCULAR at 16:54

## 2022-05-16 NOTE — ED PROVIDER NOTES
History  Chief Complaint   Patient presents with    Chest Pain     CP on inhalation onset 0930 this am     Patient is a 30-year-old female with a past medical history of Hashimoto's thyroiditis and migraines, presenting to the ED for evaluation chest pain and shortness of breath  Patient reports sudden onset left-sided chest pain this morning around 9AM   She was sitting down resting when this occurred  The pain is localized to the left side of her chest and radiates to the left shoulder blade  She states that the pain is significantly worsened with inhalation and coughing  She reports a chronic cough from postnasal drip and says the cough is unchanged  She denies exertional chest pain  She endorses associated shortness of breath and feels that she is unable to take a deep breath as this worsens her pain  The pain is also worsened with lying flat  She denies any fevers, chills, hemoptysis, orthopnea, palpitations or lower extremity edema  She denies any recent travel or surgery, exogenous estrogen use, calf tenderness or history of PE/DVT  Prior to Admission Medications   Prescriptions Last Dose Informant Patient Reported? Taking?    Calcium Carbonate (CALTRATE 600 PO)   Yes No   Sig: Take by mouth   Patient not taking: Reported on 4/12/2022    Cholecalciferol (Vitamin D-3) 125 MCG (5000 UT) TABS   Yes No   levothyroxine 75 mcg tablet   Yes No   Sig: Take 75 mcg by mouth daily   pyridoxine (VITAMIN B6) 100 mg tablet   Yes No      Facility-Administered Medications: None       Past Medical History:   Diagnosis Date    Allergic rhinitis     Resolved 8/23/2017     Cervical neuritis     COVID-19     12/20    Disease of thyroid gland     Hashimoto's thyroiditis     Migraine 08/24/2020    Daily headaches    Miscarriage 1996    Primary localized osteoarthritis of right knee 07/31/2018    Varicella     As a child    Vitamin D deficiency        Past Surgical History:   Procedure Laterality Date     SECTION      DILATION AND CURETTAGE OF UTERUS      KNEE SURGERY         Family History   Problem Relation Age of Onset    Hypertension Mother     Hyperlipidemia Mother     Osteoarthritis Mother     Thyroid disease Mother     Hyperthyroidism Mother     Asthma Mother     Heart attack Father     Heart failure Father          from heart attack  at age 68   Nadia Vega No Known Problems Sister     No Known Problems Brother     No Known Problems Daughter     Heart block Son     Arthritis Family     Osteoporosis Family      I have reviewed and agree with the history as documented  E-Cigarette/Vaping    E-Cigarette Use Never User      E-Cigarette/Vaping Substances    Nicotine No     THC No     CBD No     Flavoring No     Other No     Unknown No      Social History     Tobacco Use    Smoking status: Never Smoker    Smokeless tobacco: Never Used   Vaping Use    Vaping Use: Never used   Substance Use Topics    Alcohol use: No    Drug use: No       Review of Systems   Constitutional: Negative for appetite change, chills, fatigue and fever  HENT: Negative for congestion, rhinorrhea, sinus pressure, sinus pain and sore throat  Eyes: Negative for photophobia and visual disturbance  Respiratory: Positive for shortness of breath  Negative for cough and wheezing  Cardiovascular: Positive for chest pain  Negative for palpitations and leg swelling  Gastrointestinal: Negative for abdominal pain, blood in stool, constipation, diarrhea, nausea and vomiting  Genitourinary: Negative for difficulty urinating, dysuria, flank pain, frequency, hematuria and urgency  Musculoskeletal: Negative for arthralgias, back pain, joint swelling, myalgias and neck pain  Neurological: Negative for dizziness, syncope, weakness, light-headedness and headaches  All other systems reviewed and are negative  Physical Exam  Physical Exam  Vitals and nursing note reviewed     Constitutional: General: She is awake  Appearance: Normal appearance  She is well-developed  She is not toxic-appearing or diaphoretic  HENT:      Head: Normocephalic and atraumatic  Right Ear: External ear normal       Left Ear: External ear normal       Nose: Nose normal       Mouth/Throat:      Lips: Pink  Mouth: Mucous membranes are moist    Eyes:      General: Lids are normal  No scleral icterus  Conjunctiva/sclera: Conjunctivae normal       Pupils: Pupils are equal, round, and reactive to light  Cardiovascular:      Rate and Rhythm: Normal rate and regular rhythm  Pulses: Normal pulses  Radial pulses are 2+ on the right side and 2+ on the left side  Heart sounds: Normal heart sounds, S1 normal and S2 normal       Comments: No lower extremity edema  No calf tenderness  Pulmonary:      Effort: Pulmonary effort is normal  No accessory muscle usage  Breath sounds: Normal breath sounds  No stridor  No decreased breath sounds, wheezing, rhonchi or rales  Comments: Lungs clear and equal to auscultation bilaterally  No wheezing, rhonchi or rales  SpO2 100% on room air  Abdominal:      General: Abdomen is flat  Bowel sounds are normal  There is no distension  Palpations: Abdomen is soft  Tenderness: There is no abdominal tenderness  There is no right CVA tenderness, left CVA tenderness, guarding or rebound  Musculoskeletal:      Cervical back: Full passive range of motion without pain and neck supple  No signs of trauma  No pain with movement  Right lower leg: No edema  Left lower leg: No edema  Lymphadenopathy:      Cervical: No cervical adenopathy  Skin:     General: Skin is warm and dry  Capillary Refill: Capillary refill takes less than 2 seconds  Coloration: Skin is not cyanotic, jaundiced or pale  Neurological:      Mental Status: She is alert and oriented to person, place, and time  GCS: GCS eye subscore is 4   GCS verbal subscore is 5  GCS motor subscore is 6  Gait: Gait normal    Psychiatric:         Mood and Affect: Mood normal          Speech: Speech normal          Behavior: Behavior is cooperative           Vital Signs  ED Triage Vitals [05/16/22 1304]   Temperature Pulse Respirations Blood Pressure SpO2   98 8 °F (37 1 °C) 81 18 155/69 97 %      Temp Source Heart Rate Source Patient Position - Orthostatic VS BP Location FiO2 (%)   Oral Monitor Sitting Left arm --      Pain Score       --           Vitals:    05/16/22 1345 05/16/22 1430 05/16/22 1600 05/16/22 1800   BP:    148/65   Pulse: 66 68 70 70   Patient Position - Orthostatic VS:             Visual Acuity      ED Medications  Medications   ketorolac (TORADOL) injection 15 mg (15 mg Intravenous Given 5/16/22 1654)   iohexol (OMNIPAQUE) 350 MG/ML injection (MULTI-DOSE) 70 mL (70 mL Intravenous Given 5/16/22 1532)       Diagnostic Studies  Results Reviewed     Procedure Component Value Units Date/Time    HS Troponin I 2hr [[id number]] Collected: 05/16/22 1616    Lab Status: Final result Specimen: Blood from Arm, Right Updated: 05/16/22 1728     hs TnI 2hr <2 ng/L      Delta 2hr hsTnI --    B-Type Natriuretic Peptide(BNP) AN, CA, EA Campuses Only [[id number]]  (Normal) Collected: 05/16/22 1402    Lab Status: Final result Specimen: Blood from Arm, Right Updated: 05/16/22 1458     BNP 28 pg/mL     HS Troponin 0hr (reflex protocol) [[id number]]  (Normal) Collected: 05/16/22 1402    Lab Status: Final result Specimen: Blood from Arm, Right Updated: 05/16/22 1435     hs TnI 0hr <2 ng/L     Comprehensive metabolic panel [[id number]] Collected: 05/16/22 1402    Lab Status: Final result Specimen: Blood from Arm, Right Updated: 05/16/22 1427     Sodium 139 mmol/L      Potassium 4 0 mmol/L      Chloride 104 mmol/L      CO2 28 mmol/L      ANION GAP 7 mmol/L      BUN 15 mg/dL      Creatinine 0 81 mg/dL      Glucose 83 mg/dL      Calcium 9 9 mg/dL      AST 20 U/L      ALT 19 U/L      Alkaline Phosphatase 70 U/L      Total Protein 7 4 g/dL      Albumin 4 4 g/dL      Total Bilirubin 0 47 mg/dL      eGFR 81 ml/min/1 73sq m     Narrative:      [place] guidelines for Chronic Kidney Disease (CKD):     Stage 1 with normal or high GFR (GFR > 90 mL/min/1 73 square meters)    Stage 2 Mild CKD (GFR = 60-89 mL/min/1 73 square meters)    Stage 3A Moderate CKD (GFR = 45-59 mL/min/1 73 square meters)    Stage 3B Moderate CKD (GFR = 30-44 mL/min/1 73 square meters)    Stage 4 Severe CKD (GFR = 15-29 mL/min/1 73 square meters)    Stage 5 End Stage CKD (GFR <15 mL/min/1 73 square meters)  Note: GFR calculation is accurate only with a steady state creatinine    Magnesium [[id number]]  (Normal) Collected: 05/16/22 1402    Lab Status: Final result Specimen: Blood from Arm, Right Updated: 05/16/22 1427     Magnesium 2 2 mg/dL     D-Dimer [[id number]]  (Abnormal) Collected: 05/16/22 1402    Lab Status: Final result Specimen: Blood from Arm, Right Updated: 05/16/22 1424     D-Dimer, Quant 2 53 ug/ml FEU     Narrative: In the evaluation for possible pulmonary embolism, in the appropriate (Well's Score of 4 or less) patient, the age adjusted d-dimer cutoff for this patient can be calculated as:    Age x 0 01 (in ug/mL) for Age-adjusted D-dimer exclusion threshold for a patient over 50 years      Quin Dominguez [[id number]]  (Normal) Collected: 05/16/22 1402    Lab Status: Final result Specimen: Blood from Arm, Right Updated: 05/16/22 1422     Protime 12 3 seconds      INR 0 92    APTT [[id number]]  (Normal) Collected: 05/16/22 1402    Lab Status: Final result Specimen: Blood from Arm, Right Updated: 05/16/22 1422     PTT 30 seconds     CBC and differential [[id number]] Collected: 05/16/22 1402    Lab Status: Final result Specimen: Blood from Arm, Right Updated: 05/16/22 1410     WBC 8 13 Thousand/uL      RBC 4 85 Million/uL      Hemoglobin 15 2 g/dL      Hematocrit 45 9 %      MCV 95 fL      MCH 31 3 pg      MCHC 33 1 g/dL      RDW 12 4 %      MPV 9 3 fL      Platelets 802 Thousands/uL      nRBC 0 /100 WBCs      Neutrophils Relative 57 %      Immat GRANS % 0 %      Lymphocytes Relative 33 %      Monocytes Relative 8 %      Eosinophils Relative 1 %      Basophils Relative 1 %      Neutrophils Absolute 4 64 Thousands/µL      Immature Grans Absolute 0 02 Thousand/uL      Lymphocytes Absolute 2 70 Thousands/µL      Monocytes Absolute 0 62 Thousand/µL      Eosinophils Absolute 0 10 Thousand/µL      Basophils Absolute 0 05 Thousands/µL                  CTA ED chest PE study   Final Result by Darryle Barrios, MD (05/16 1544)      No acute findings  Workstation performed: [id number]         XR chest 1 view portable   Final Result by Irma Valadez DO (05/16 1835)   No acute cardiopulmonary disease  Workstation performed: [id number]                    Procedures  ECG 12 Lead Documentation Only    Date/Time: 5/16/2022 2:38 PM  Performed by: John Juárez PA-C  Authorized by: John Juárez PA-C     Indications / Diagnosis:  Chest pain  ECG reviewed by me, the ED Provider: yes    Patient location:  ED  Previous ECG:     Previous ECG:  Compared to current    Comparison ECG info:  3/24/22    Similarity:  No change    Comparison to cardiac monitor: Yes    Interpretation:     Interpretation: normal    Rate:     ECG rate:  67    ECG rate assessment: normal    Rhythm:     Rhythm: sinus rhythm    Ectopy:     Ectopy: none    QRS:     QRS axis:  Normal    QRS intervals:  Normal  Conduction:     Conduction: normal    ST segments:     ST segments:  Normal  T waves:     T waves: non-specific and inverted      Inverted:  AVR and V1  Comments:      No STEMI  QT/QTc 390/412               ED Course  ED Course as of 05/16/22 2054   Mon May 16, 2022   1437 D-Dimer, Quant(!): 2 53             HEART Risk Score    Flowsheet Row Most Recent Value   Heart Score Risk Calculator History 0 Filed at: 05/16/2022 1352   ECG 0 Filed at: 05/16/2022 1352   Age 1 Filed at: 05/16/2022 1352   Risk Factors 1  [Family history of ACS] Filed at: 05/16/2022 1352   Troponin 0 Filed at: 05/16/2022 1352   HEART Score 2 Filed at: 05/16/2022 1352                PERC Rule for PE    Flowsheet Row Most Recent Value   PERC Rule for PE    Age >=50 1 Filed at: 05/16/2022 1353   HR >=100 0 Filed at: 05/16/2022 1353   O2 Sat on room air < 95% 0 Filed at: 05/16/2022 1353   History of PE or DVT 0 Filed at: 05/16/2022 1353   Recent trauma or surgery 0 Filed at: 05/16/2022 1353   Hemoptysis 0 Filed at: 05/16/2022 1353   Exogenous estrogen 0 Filed at: 05/16/2022 1353   Unilateral leg swelling 0 Filed at: 05/16/2022 1353   PERC Rule for PE Results 1 Filed at: 05/16/2022 1353                  Wells' Criteria for PE    Flowsheet Row Most Recent Value   Wells' Criteria for PE    Clinical signs and symptoms of DVT 0 Filed at: 05/16/2022 1353   PE is primary diagnosis or equally likely 3 Filed at: 05/16/2022 1353   HR >100 0 Filed at: 05/16/2022 1353   Immobilization at least 3 days or Surgery in the previous 4 weeks 0 Filed at: 05/16/2022 1353   Previous, objectively diagnosed PE or DVT 0 Filed at: 05/16/2022 1353   Hemoptysis 0 Filed at: 05/16/2022 1353   Malignancy with treatment within 6 months or palliative 0 Filed at: 05/16/2022 1353   Wells' Criteria Total 3 Filed at: 05/16/2022 1353                MDM  Number of Diagnoses or Management Options  Chest pain  Cholelithiasis  Left shoulder pain  Diagnosis management comments: Patient is a 80-year-old female with a past medical history of Hashimoto's thyroiditis and migraines, presenting to the ED for evaluation chest pain and shortness of breath  Labs notable for an elevated D-dimer and a CT a was obtained  No evidence of pulmonary embolism or other acute abnormalities on CT  Troponin negative x2  EKG normal sinus rhythm   Patient reassured with results and advised to follow-up with her PCP as soon as possible or return to the ED for new/worsening symptoms  The management plan was discussed in detail with the patient at bedside and all questions were answered  Strict ED return instructions were discussed at bedside  Prior to discharge, both verbal and written instructions were provided  We discussed the signs and symptoms that should prompt the patient to return to the ED  All questions were answered and the patient was comfortable with the plan of care and discharged home  The patient agrees to return to the Emergency Department for concerns and/or progression of illness  Amount and/or Complexity of Data Reviewed  Clinical lab tests: ordered and reviewed  Tests in the radiology section of CPT®: ordered and reviewed    Patient Progress  Patient progress: stable      Disposition  Final diagnoses:   Chest pain   Left shoulder pain   Cholelithiasis     Time reflects when diagnosis was documented in both MDM as applicable and the Disposition within this note     Time User Action Codes Description Comment    5/16/2022  5:35 PM Tiara [place] Add [R07 9] Chest pain     5/16/2022  5:35 PM Tiara [place] Add [M98 612] Left shoulder pain     5/16/2022  5:36 PM Tiara Royce Add [K80 20] Cholelithiasis       ED Disposition     ED Disposition   Discharge    Condition   Stable    Date/Time   Mon May 16, 2022  5:35 PM    105 Yana Jones discharge to home/self care                 Follow-up Information     Follow up With Specialties Details Why Contact Info Additional Information    Trinidad Christopher MD Family Medicine Schedule an appointment as soon as possible for a visit   [address]  [facility] [phone]       [place] [address] Emergency Department Emergency Medicine  If symptoms worsen [address] Emergency Department, [address] [place], [address]          Discharge Medication List as of 5/16/2022  6:29 PM      START taking these medications    Details   lidocaine (Lidoderm) 5 % Apply 1 patch topically in the morning  Remove & Discard patch within 12 hours or as directed by MD , Starting Mon 5/16/2022, Normal         CONTINUE these medications which have NOT CHANGED    Details   Calcium Carbonate (CALTRATE 600 PO) Take by mouth, Historical Med      Cholecalciferol (Vitamin D-3) 125 MCG (5000 UT) TABS Starting Fri 4/1/2022, Historical Med      levothyroxine 75 mcg tablet Take 75 mcg by mouth daily, Starting Fri 4/9/2021, Historical Med      pyridoxine (VITAMIN B6) 100 mg tablet Starting Fri 4/1/2022, Historical Med             No discharge procedures on file      PDMP Review     None          ED Provider  Electronically Signed by           Alo Lala PA-C  05/16/22 2054

## 2022-05-16 NOTE — Clinical Note
Mainor Sahu was seen and treated in our emergency department on 5/16/2022  Diagnosis:     Jeannie Ochoa  may return to work on return date  She may return on this date: 05/18/2022         If you have any questions or concerns, please don't hesitate to call        Laura Bowie PA-C    ______________________________           _______________          _______________  Hospital Representative                              Date                                Time

## 2022-05-16 NOTE — Clinical Note
Lakshmi Jamilsterling was seen and treated in our emergency department on 5/16/2022  Diagnosis:     Rico Mercado  may return to work on return date  She may return on this date: 05/18/2022         If you have any questions or concerns, please don't hesitate to call        Faviola Macias PA-C    ______________________________           _______________          _______________  Hospital Representative                              Date                                Time

## 2022-05-16 NOTE — Clinical Note
Gurdeep Sow was seen and treated in our emergency department on 5/16/2022  Diagnosis:     Alma Finn  may return to work on return date  She may return on this date: 05/18/2022         If you have any questions or concerns, please don't hesitate to call        Ting Wheeler PA-C    ______________________________           _______________          _______________  Hospital Representative                              Date                                Time

## 2022-05-16 NOTE — Clinical Note
Singh Marin was seen and treated in our emergency department on 5/16/2022  Diagnosis:     Loreta Elder  may return to work on return date  She may return on this date: 05/18/2022         If you have any questions or concerns, please don't hesitate to call        Derek Ramsey PA-C    ______________________________           _______________          _______________  Hospital Representative                              Date                                Time

## 2022-05-18 ENCOUNTER — CLINICAL SUPPORT (OUTPATIENT)
Dept: PULMONOLOGY | Facility: CLINIC | Age: 57
End: 2022-05-18
Payer: COMMERCIAL

## 2022-05-18 DIAGNOSIS — U09.9 POST-ACUTE SEQUELAE OF COVID-19 (PASC): Primary | ICD-10-CM

## 2022-05-18 PROCEDURE — 94625 PHY/QHP OP PULM RHB W/O MNTR: CPT

## 2022-05-20 LAB
ATRIAL RATE: 70 BPM
P AXIS: 56 DEGREES
PR INTERVAL: 154 MS
QRS AXIS: 10 DEGREES
QRSD INTERVAL: 78 MS
QT INTERVAL: 390 MS
QTC INTERVAL: 412 MS
T WAVE AXIS: 30 DEGREES
VENTRICULAR RATE: 67 BPM

## 2022-05-20 PROCEDURE — 93010 ELECTROCARDIOGRAM REPORT: CPT | Performed by: INTERNAL MEDICINE

## 2022-05-23 ENCOUNTER — CLINICAL SUPPORT (OUTPATIENT)
Dept: PULMONOLOGY | Facility: CLINIC | Age: 57
End: 2022-05-23
Payer: COMMERCIAL

## 2022-05-23 DIAGNOSIS — U09.9 POST-ACUTE SEQUELAE OF COVID-19 (PASC): Primary | ICD-10-CM

## 2022-05-23 PROCEDURE — 94625 PHY/QHP OP PULM RHB W/O MNTR: CPT

## 2022-05-25 ENCOUNTER — APPOINTMENT (OUTPATIENT)
Dept: PULMONOLOGY | Facility: CLINIC | Age: 57
End: 2022-05-25
Payer: COMMERCIAL

## 2022-05-25 ENCOUNTER — OFFICE VISIT (OUTPATIENT)
Dept: CARDIOLOGY CLINIC | Facility: CLINIC | Age: 57
End: 2022-05-25
Payer: COMMERCIAL

## 2022-05-25 ENCOUNTER — CLINICAL SUPPORT (OUTPATIENT)
Dept: PULMONOLOGY | Facility: CLINIC | Age: 57
End: 2022-05-25
Payer: COMMERCIAL

## 2022-05-25 VITALS
OXYGEN SATURATION: 99 % | DIASTOLIC BLOOD PRESSURE: 76 MMHG | SYSTOLIC BLOOD PRESSURE: 118 MMHG | HEIGHT: 66 IN | HEART RATE: 90 BPM | WEIGHT: 139 LBS | BODY MASS INDEX: 22.34 KG/M2

## 2022-05-25 DIAGNOSIS — U09.9 POST-ACUTE SEQUELAE OF COVID-19 (PASC): Primary | ICD-10-CM

## 2022-05-25 DIAGNOSIS — R07.9 CHEST PAIN IN ADULT: Primary | ICD-10-CM

## 2022-05-25 DIAGNOSIS — R06.02 SHORTNESS OF BREATH ON EXERTION: ICD-10-CM

## 2022-05-25 PROCEDURE — 3008F BODY MASS INDEX DOCD: CPT | Performed by: INTERNAL MEDICINE

## 2022-05-25 PROCEDURE — 99214 OFFICE O/P EST MOD 30 MIN: CPT | Performed by: INTERNAL MEDICINE

## 2022-05-25 PROCEDURE — 94625 PHY/QHP OP PULM RHB W/O MNTR: CPT

## 2022-05-25 PROCEDURE — 1036F TOBACCO NON-USER: CPT | Performed by: INTERNAL MEDICINE

## 2022-05-25 NOTE — PROGRESS NOTES
Cardiology Follow Up    David Postin  1965  [id number]  [facility]  [address]    [facility] [id number] [phone]    1  Chest pain in adult     2  Shortness of breath on exertion         Interval History: Followup shortness of breath and palpitations  Patient had Sheryl in 2020  She has had intermittent dyspnea and palpitations since then  She had a holter monitor that was relatively normal  She had a nuclear stress test and echocardiogram that were normal      More recently she has had intermittent dizziness, sometimes when standing  She had one episode at the end of pulmonary rehab  She has had multiple ER visits for all of her symptoms  Last week she was in the ER for stabbing chest pain that was pleuritic  She had associated scapular pain and radiation to her neck  CTA of the chest was negative  Her chest discomfort lasted about 36 hours and subsided  She has still some intermittent scapular pain       Medical Problems             Problem List     Primary localized osteoarthritis of right knee    Baker's cyst of knee, right    Vaginal atrophy    Screening for diabetes mellitus    Vitamin D deficiency    Facial numbness    Hashimoto's thyroiditis    Microscopic hematuria    Multinodular thyroid    Premature atrial complex    Premature ventricular contraction    Urinary incontinence    Vertigo    COVID-19 virus infection    Elevated blood pressure reading    Need for hepatitis C screening test    Insomnia    Snoring    Mixed hyperlipidemia    DARON (obstructive sleep apnea)    Anxiety    SOB (shortness of breath) on exertion              Past Medical History:   Diagnosis Date    Allergic rhinitis     Resolved 8/23/2017     Cervical neuritis     COVID-19     12/20    Disease of thyroid gland     Hashimoto's thyroiditis     Migraine 08/24/2020    Daily headaches    Miscarriage 1996    Primary localized osteoarthritis of right knee 2018    Sleep apnea     Varicella     As a child    Vitamin D deficiency      Social History     Socioeconomic History    Marital status: /Civil Union     Spouse name: Not on file    Number of children: 2    Years of education: Not on file    Highest education level:  Bachelor's degree (e g , BA, AB, BS)   Occupational History    Occupation: RN, weekends    Tobacco Use    Smoking status: Never Smoker    Smokeless tobacco: Never Used   Vaping Use    Vaping Use: Never used   Substance and Sexual Activity    Alcohol use: No    Drug use: No    Sexual activity: Not Currently     Partners: Male     Comment: not sexually active and acceptable to both   Other Topics Concern    Not on file   Social History Narrative    Daily cola consumption (__cans/day)    Daily tea consumption (1 cup/day)    Does not exercise     x25 years     Social Determinants of Health     Financial Resource Strain: Not on file   Food Insecurity: Not on file   Transportation Needs: Not on file   Physical Activity: Not on file   Stress: Not on file   Social Connections: Not on file   Intimate Partner Violence: Not on file   Housing Stability: Not on file      Family History   Problem Relation Age of Onset    Hypertension Mother     Hyperlipidemia Mother     Osteoarthritis Mother     Thyroid disease Mother     Hyperthyroidism Mother     Asthma Mother     Heart attack Father     Heart failure Father          from heart attack  at age 68   Jamel Fernando No Known Problems Sister     No Known Problems Brother     No Known Problems Daughter     Heart block Son     Arthritis Family     Osteoporosis Family      Past Surgical History:   Procedure Laterality Date     SECTION      DILATION AND CURETTAGE OF UTERUS      KNEE SURGERY         Current Outpatient Medications:     Calcium Carbonate (CALTRATE 600 PO), Take by mouth, Disp: , Rfl:     levothyroxine 75 mcg tablet, Take 75 mcg by mouth daily, Disp: , Rfl:     Cholecalciferol (Vitamin D-3) 125 MCG (5000 UT) TABS, , Disp: , Rfl:     lidocaine (Lidoderm) 5 %, Apply 1 patch topically in the morning  Remove & Discard patch within 12 hours or as directed by MD  (Patient not taking: No sig reported), Disp: 15 patch, Rfl: 0    pyridoxine (VITAMIN B6) 100 mg tablet, , Disp: , Rfl:   No Known Allergies    Labs:     Chemistry        Component Value Date/Time     09/12/2017 0844    K 4 0 05/16/2022 1402    K 4 1 11/07/2019 0859     05/16/2022 1402     11/07/2019 0859    CO2 28 05/16/2022 1402    CO2 31 11/07/2019 0859    BUN 15 05/16/2022 1402    BUN 14 11/07/2019 0859    CREATININE 0 81 05/16/2022 1402    CREATININE 0 76 09/12/2017 0844        Component Value Date/Time    CALCIUM 9 9 05/16/2022 1402    CALCIUM 9 9 11/07/2019 0859    ALKPHOS 70 05/16/2022 1402    ALKPHOS 79 11/07/2019 0859    AST 20 05/16/2022 1402    AST 19 11/07/2019 0859    ALT 19 05/16/2022 1402    ALT 17 11/07/2019 0859    BILITOT 0 6 09/12/2017 0844            No results found for: CHOL  Lab Results   Component Value Date    HDL 61 03/28/2022    HDL 67 11/07/2019     Lab Results   Component Value Date    LDLCALC 143 (H) 03/28/2022    LDLCALC 123 (H) 11/07/2019     Lab Results   Component Value Date    TRIG 70 03/28/2022    TRIG 72 11/07/2019     No results found for: CHOLHDL    Imaging: XR chest 1 view portable    Result Date: 5/16/2022  Narrative: CHEST INDICATION:   chest pain, sob  COMPARISON:  May 2021 EXAM PERFORMED/VIEWS:  XR CHEST PORTABLE Single image FINDINGS:  Lungs adequately aerated  Cardiomediastinal silhouette appears unremarkable  The lungs are clear  No pneumothorax or pleural effusion  Osseous structures appear within normal limits for patient age  Impression: No acute cardiopulmonary disease   Workstation performed: [id number]     CTA ED chest PE study    Result Date: 5/16/2022  Narrative: CTA - CHEST WITH IV CONTRAST - PULMONARY ANGIOGRAM INDICATION:   Pleuritic chest pain, SOB, elevated dimer  "Patient is a 45-year-old female with a past medical history of Hashimoto's thyroiditis and migraines, presenting to the ED for evaluation chest pain and shortness of breath  Patient reports sudden onset left-sided chest pain this morning around 9AM  " COMPARISON: 3/24/2022 TECHNIQUE: CTA examination of the chest was performed using angiographic technique according to a protocol specifically tailored to evaluate for pulmonary embolism  Axial, sagittal, and coronal 2D reformatted images were created from the source data and  submitted for interpretation  In addition, coronal 3D MIP postprocessing was performed on the acquisition scanner  Radiation dose length product (DLP) for this visit:  195 mGy-cm   This examination, like all CT scans performed in the [facility], was performed utilizing techniques to minimize radiation dose exposure, including the use of iterative reconstruction and automated exposure control  IV Contrast:  70 mL of iohexol (OMNIPAQUE)  FINDINGS: PULMONARY ARTERIAL TREE:  No pulmonary embolus is seen  LUNGS:  Lungs are clear  There is no tracheal or endobronchial lesion  PLEURA:  Unremarkable  HEART/GREAT VESSELS:  Unremarkable for patient's age  No thoracic aortic aneurysm  MEDIASTINUM AND PRANAV:  Unremarkable  CHEST WALL AND LOWER NECK:   Unremarkable  VISUALIZED STRUCTURES IN THE UPPER ABDOMEN:  Cholelithiasis  No pericholecystic inflammatory findings  OSSEOUS STRUCTURES:  No acute fracture or destructive osseous lesion  Impression: No acute findings  Workstation performed: [id number]       EKG: NSR Normal ECG     Review of Systems   Constitutional: Positive for malaise/fatigue  HENT: Negative  Eyes: Negative  Cardiovascular: Negative  Respiratory: Positive for shortness of breath  Endocrine: Negative  Hematologic/Lymphatic: Negative  Skin: Negative  Musculoskeletal: Negative  Gastrointestinal: Negative  Genitourinary: Negative  Neurological: Negative  Psychiatric/Behavioral: Negative  Allergic/Immunologic: Negative  Vitals:    05/25/22 0933   BP: 118/76   Pulse: 90   SpO2: 99%           Physical Exam  Vitals and nursing note reviewed  Constitutional:       Appearance: Normal appearance  HENT:      Head: Normocephalic  Nose: Nose normal       Mouth/Throat:      Mouth: Mucous membranes are moist    Eyes:      General: No scleral icterus  Conjunctiva/sclera: Conjunctivae normal    Cardiovascular:      Rate and Rhythm: Normal rate and regular rhythm  Heart sounds: No murmur heard  No gallop  Pulmonary:      Effort: Pulmonary effort is normal  No respiratory distress  Breath sounds: Normal breath sounds  No wheezing or rales  Abdominal:      General: Abdomen is flat  Bowel sounds are normal  There is no distension  Palpations: Abdomen is soft  Tenderness: There is no abdominal tenderness  There is no guarding  Musculoskeletal:      Cervical back: Normal range of motion and neck supple  Right lower leg: No edema  Left lower leg: No edema  Skin:     General: Skin is warm and dry  Neurological:      General: No focal deficit present  Mental Status: She is alert and oriented to person, place, and time  Psychiatric:         Mood and Affect: Mood normal          Behavior: Behavior normal          Discussion/Summary:    Dyspnea / lightheadedness: She is currently in pulmonary rehab  She has a history of PACs and currently isnt very symptomatic  She has rare PACs on her holter which is overall essentially normal      I think her lightheadedness may improve with better hydration and we discussed this  Her heart is structurally normal and there is no evidence of coronary disease based on her stress testing  Followup can be prn           The patient was counseled regarding diagnostic results, instructions for management, risk factor reductions, impressions  total time of encounter was 25 minutes and 15 minutes was spent counseling

## 2022-06-01 ENCOUNTER — CLINICAL SUPPORT (OUTPATIENT)
Dept: PULMONOLOGY | Facility: CLINIC | Age: 57
End: 2022-06-01
Payer: COMMERCIAL

## 2022-06-01 ENCOUNTER — APPOINTMENT (OUTPATIENT)
Dept: PULMONOLOGY | Facility: CLINIC | Age: 57
End: 2022-06-01
Payer: COMMERCIAL

## 2022-06-01 DIAGNOSIS — U09.9 POST-ACUTE SEQUELAE OF COVID-19 (PASC): Primary | ICD-10-CM

## 2022-06-01 PROCEDURE — 94625 PHY/QHP OP PULM RHB W/O MNTR: CPT

## 2022-06-06 ENCOUNTER — CLINICAL SUPPORT (OUTPATIENT)
Dept: PULMONOLOGY | Facility: CLINIC | Age: 57
End: 2022-06-06
Payer: COMMERCIAL

## 2022-06-06 ENCOUNTER — APPOINTMENT (OUTPATIENT)
Dept: PULMONOLOGY | Facility: CLINIC | Age: 57
End: 2022-06-06
Payer: COMMERCIAL

## 2022-06-06 DIAGNOSIS — U09.9 POST-ACUTE SEQUELAE OF COVID-19 (PASC): Primary | ICD-10-CM

## 2022-06-06 PROCEDURE — 94625 PHY/QHP OP PULM RHB W/O MNTR: CPT

## 2022-06-08 ENCOUNTER — APPOINTMENT (OUTPATIENT)
Dept: PULMONOLOGY | Facility: CLINIC | Age: 57
End: 2022-06-08
Payer: COMMERCIAL

## 2022-06-08 ENCOUNTER — CLINICAL SUPPORT (OUTPATIENT)
Dept: PULMONOLOGY | Facility: CLINIC | Age: 57
End: 2022-06-08
Payer: COMMERCIAL

## 2022-06-08 DIAGNOSIS — U09.9 POST-ACUTE SEQUELAE OF COVID-19 (PASC): Primary | ICD-10-CM

## 2022-06-08 PROCEDURE — 94625 PHY/QHP OP PULM RHB W/O MNTR: CPT

## 2022-06-08 NOTE — PROGRESS NOTES
Pulmonary Rehabilitation Plan of Care   30 Day Reassessment      Today's date: 2022   # of Exercise Sessions Completed: 7  Patient name: Marvin Bernstein      : 1965  Age: 65 y o  MRN: [id number]  Referring Physician: Letty Ji MD  Pulmonologist: Gerardo Nuñez MD   Provider: [facility]  Clinician: Indiana Dunn MS    Dx:   Encounter Diagnosis   Name Primary? 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     Date of onset: 2020      SUMMARY OF PROGRESS:  Lori is compliant attending pulmonary rehab exercise sessions 2x/wk  The patient tolerates 30-49 mins at 1 72 - 3 07 METs room air  Resting SpO2 96 - 98% with decreased O2 saturation during exercise 92 - 97%  Will exercise on room air Resting BP  116/78 - 146/80 with appropriate response to exercise reaching 106/72- 136/74  RHR 76 - 94,  ExHR 97 - 118  The patient reports dizziness during exercise  Rating of perceived dyspnea with exercise 1-3/10 at max METs  Pt wears telemetry during exercise due to recent chest pain, she has recently been admitted to the ED for chest pain and her vitals were stable, with NSR  She has not experienced any chest pain in rehab along with NSR on telemetry, she will be discharged from the use of telemetry starting next session  Pt discussed with cardiologist about adding electrolytes to help with lightheadedness, possibly due to dehydration  She has not begun a regular exercise program at home, patient states walking a lot at work, gardening and completing errands regularly   The patient is a non-smoker  Depression screening using the PHQ-9 and MARIO-7 was not reassessed  When addressed, the patient admits to  feelings of depression/anxiety/stress but manages it well  Patient reports excellent social/emotional support  Patient attends group educational classes on pulmonary disease  Pursed lipped breathing and Diaphragmatic breathing continues to be demonstrated, practiced, and reinforced with the patient  Her exercise program will be progressed as tolerated  The patient has the following personal goals he hopes to achieve by discharge: add at home exercise, using the walking path behind her house, increase stamina and strength, continue eating a healthy diet, and feel comfortable doing ADLs without feeling that it is dangerous  Pt will continue to be educated on lifestyle modifications and encouraged to supplement with a home exercise program to reach the following goals: increase stamina and strength, and add at home exercise in the next 30 days  Medication compliance: Yes   Comments: Pt reports to be compliant with medications  Fall Risk: Low   Comments: Ambulates with a steady gait with no assist device    Smoking: Never used    RPD at Rest: 0/10  RPD with Exercise:  1-3/10    Assessment of progression of lung disease and functional status:  Shortness of breath questionnaire: 28/120      EXERCISE ASSESSMENT and PLAN    Current Exercise Program in Rehab:       Frequency: 2 days/week        Minutes: 30-49         METS: 1 72-3 07              SpO2: >90%              RPD: 1-3                      HR: RHR +30    RPE: 3-4         Modalities: Treadmill, UBE, Lifecycle, Elliptical and NuStep      Exercise Progression 30 Day Goals :    Frequency: 2 days/week        Minutes: 45-60         METS: 3 0-4 0              SpO2: >90%              RPD: 3-5                      HR: RHR +30   RPE: 4-5        Modalities: Treadmill, Airdyne bike, Lifecycle and Elliptical     Strength training:   Will be added following 2-3 weeks of monitored exercise sessions   Modalities: Leg Press, Chest Press, Pull Downs, Arm Curl and Seated Row    Oxygen Needs: on room air at rest  Oxygen Goal: Maintain SpO2>90% during exercise    Home Exercise: none  Education: pursed lipped breathing, diaphragmatic breathing, relaxation breathing, home exercise and appropriate O2 response to exercise    Goals: Improved 6MW distance by 10% and reduced RPD at rest  Progressing:  Pt is progressing and showing improvement  toward the following goals:  increasing duration and workloads on exercise equipment    , Will continue to educate and progress as tolerated  Plan: learn to conserve energy with ADLs , practice breathing techniques 3x/day and reduce time sitting at home    Readiness to change: Action:  (Changing behavior)      NUTRITION ASSESSMENT AND PLAN    Weight control:    Starting weight: 142   Current weight:  140 6     Diabetes: N/A    Goals:LDL <100, HDL >40, TRG <150, CHOL <200, choose lean meat (93-95%), eliminate processed meats, reduce portion sizes of meat to 3oz or less, increase intake of fish, shellfish and cook without added fat or use vegetable oil/spray  Education: heart healthy eating  low sodium diet  hydration  Progressing:Pt is progressing and showing improvement  toward the following goals:  continuing a healthy diet and gardening   , Will continue to educate and progress as tolerated  Plan: avoid processed foods, will try new grains like brown rice, quinoa, farro, will replace sugar sweetened cereals with whole grain or oatmeal, drink more water, learn how to read food labels, replace sugar with stevia or truvia and keep added daily sugar <25g/day  Readiness to change: Action:  (Changing behavior)      PSYCHOSOCIAL ASSESSMENT AND PLAN    Emotional:  Depression assessment:  PHQ-9 = 1-4 = Minimal Depression            Anxiety measure:  MARIO-7 = 0-4  = Not anxious  Self-reported stress level: 8   Social support: Excellent    Goals:  Physical Fitness in [facility] Score < 3, increased energy and take time to relax  Education: signs/sxs of depression, benefits of a positive support system and stress management techniques    Progressing:Will continue to educate and progress as tolerated  , patient has been managing any stress well  Plan: Class: Stress and Your Health, Practice relaxation techniques, Exercise, Spend time outdoors, Enjoy a hobby and Read a Book  Readiness to change: Action:  (Changing behavior)      OTHER CORE COMPONENTS     Tobacco:   Social History     Tobacco Use   Smoking Status Never Smoker   Smokeless Tobacco Never Used       Tobacco Use Intervention: Referral to tobacco expert:   N/A:  Patient is a non-smoker     Blood pressure:    Restin//80   Exercise: 106//74    Goals: consistent BP < 130/80, reduced dietary sodium <2300mg and moderate intensity exercise >150 mins/wk  Education:  pathophysiology of pulmonary disease  Progressing:Pt is progressing and showing improvement  toward the following goals:  watches sodium intake   , Will continue to educate and progress as tolerated    Plan: Avoid Processed foods, engage in regular exercise and eliminate salt shaker at the table  Readiness to change: Action:  (Changing behavior)

## 2022-06-13 ENCOUNTER — APPOINTMENT (OUTPATIENT)
Dept: PULMONOLOGY | Facility: CLINIC | Age: 57
End: 2022-06-13
Payer: COMMERCIAL

## 2022-06-13 ENCOUNTER — CLINICAL SUPPORT (OUTPATIENT)
Dept: PULMONOLOGY | Facility: CLINIC | Age: 57
End: 2022-06-13
Payer: COMMERCIAL

## 2022-06-13 DIAGNOSIS — U09.9 POST-ACUTE SEQUELAE OF COVID-19 (PASC): Primary | ICD-10-CM

## 2022-06-13 PROCEDURE — 94625 PHY/QHP OP PULM RHB W/O MNTR: CPT

## 2022-06-15 ENCOUNTER — CLINICAL SUPPORT (OUTPATIENT)
Dept: PULMONOLOGY | Facility: CLINIC | Age: 57
End: 2022-06-15
Payer: COMMERCIAL

## 2022-06-15 ENCOUNTER — APPOINTMENT (OUTPATIENT)
Dept: PULMONOLOGY | Facility: CLINIC | Age: 57
End: 2022-06-15
Payer: COMMERCIAL

## 2022-06-15 DIAGNOSIS — U09.9 POST-ACUTE SEQUELAE OF COVID-19 (PASC): Primary | ICD-10-CM

## 2022-06-15 PROCEDURE — 94625 PHY/QHP OP PULM RHB W/O MNTR: CPT

## 2022-06-20 ENCOUNTER — APPOINTMENT (OUTPATIENT)
Dept: PULMONOLOGY | Facility: CLINIC | Age: 57
End: 2022-06-20
Payer: COMMERCIAL

## 2022-06-22 ENCOUNTER — APPOINTMENT (OUTPATIENT)
Dept: PULMONOLOGY | Facility: CLINIC | Age: 57
End: 2022-06-22
Payer: COMMERCIAL

## 2022-06-27 ENCOUNTER — APPOINTMENT (OUTPATIENT)
Dept: PULMONOLOGY | Facility: CLINIC | Age: 57
End: 2022-06-27
Payer: COMMERCIAL

## 2022-06-29 ENCOUNTER — APPOINTMENT (OUTPATIENT)
Dept: PULMONOLOGY | Facility: CLINIC | Age: 57
End: 2022-06-29
Payer: COMMERCIAL

## 2022-06-29 ENCOUNTER — CLINICAL SUPPORT (OUTPATIENT)
Dept: PULMONOLOGY | Facility: CLINIC | Age: 57
End: 2022-06-29
Payer: COMMERCIAL

## 2022-06-29 DIAGNOSIS — U09.9 POST-ACUTE SEQUELAE OF COVID-19 (PASC): Primary | ICD-10-CM

## 2022-06-29 PROCEDURE — 94625 PHY/QHP OP PULM RHB W/O MNTR: CPT

## 2022-07-05 ENCOUNTER — TELEPHONE (OUTPATIENT)
Dept: SLEEP CENTER | Facility: CLINIC | Age: 57
End: 2022-07-05

## 2022-07-05 NOTE — TELEPHONE ENCOUNTER
----- Message from Reddy Monreal DO sent at 7/1/2022 10:43 PM EDT -----  approved  ----- Message -----  From: Robi Fuentes  Sent: 4/13/2022   8:58 AM EDT  To: Sleep Medicine [place] Provider    This sleep study needs approval      If approved please sign and return to clerical pool  If denied please include reasons why  Also provide alternative testing if warranted  Please sign and return to clerical pool

## 2022-07-06 ENCOUNTER — APPOINTMENT (OUTPATIENT)
Dept: PULMONOLOGY | Facility: CLINIC | Age: 57
End: 2022-07-06
Payer: COMMERCIAL

## 2022-07-06 ENCOUNTER — CLINICAL SUPPORT (OUTPATIENT)
Dept: PULMONOLOGY | Facility: CLINIC | Age: 57
End: 2022-07-06
Payer: COMMERCIAL

## 2022-07-06 DIAGNOSIS — U09.9 POST-ACUTE SEQUELAE OF COVID-19 (PASC): Primary | ICD-10-CM

## 2022-07-06 PROCEDURE — 94625 PHY/QHP OP PULM RHB W/O MNTR: CPT

## 2022-07-06 NOTE — PROGRESS NOTES
Pulmonary Rehabilitation Plan of Care   60 Day Reassessment      Today's date: 2022   # of Exercise Sessions Completed: 11  Patient name: Taryn Francois      : 1965  Age: 65 y o  MRN: [id number]  Referring Physician: Neeta Araya MD  Pulmonologist: Yessenia Carballo MD   Provider: Alejandro Valdez  Clinician: Marisa Montalvo MS, CEP     Dx:   Encounter Diagnosis   Name Primary? 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     Date of onset: 2020      SUMMARY OF PROGRESS:  Lori is compliant attending pulmonary rehab exercise sessions 2x/wk and was recently out for a week due to vacation  The patient tolerates 40-45 mins at 2 0-3 1 METs room air  Resting SpO2 96-98% with decreased O2 saturation during exercise 92 - 96% on room air  Resting //88  with appropriate response to exercise reaching 118//80  The patient reports chest pain and dizziness during exercise  We have discontinued telemetry monitoring with patient  Rating of perceived dyspnea with exercise 0-4/10 at max METs  Will begin a strength training program within the next 30 days  Pt discussed with cardiologist about adding electrolytes to help with lightheadedness, possibly due to dehydration  Today she is reporting that she feels the improvement and was very happy she was able to go on vacation and walk long distances and hike  She wants to continue on with a structured home plan following exercise  She also has returned back to work with no concerns  She has not begun a regular exercise program at home, patient states walking a lot at work, gardening and completing errands regularly  She reported today that she will be continuing with exercise using her home ELL and walking  Depression screening using the PHQ-9 and MARIO-7 was not reassessed due to no concerns  When addressed, the patient admits to  feelings of depression/anxiety/stress but manages it well  Patient reports excellent social/emotional support     Patient attends group educational classes on pulmonary disease  Pursed lipped breathing and Diaphragmatic breathing continues to be demonstrated, practiced, and reinforced with the patient  Her exercise program will be progressed as tolerated  The patient has the following personal goals he hopes to achieve by discharge: add at home exercise, using the walking path behind her house, increase stamina and strength, continue eating a healthy diet, and feel comfortable doing ADLs without feeling that it is dangerous  Patient is on track with her personal goals at 60 days  Pt will continue to be educated on lifestyle modifications and encouraged to supplement with a home exercise program to reach the following goals: increase stamina and strength and start weight training program      Medication compliance: Yes   Comments: Pt reports to be compliant with medications  Fall Risk: Low   Comments: Ambulates with a steady gait with no assist device    Smoking: Never used    RPD at Rest: 0/10  RPD with Exercise:  1-3/10    Assessment of progression of lung disease and functional status:  Shortness of breath questionnaire: 28/120      EXERCISE ASSESSMENT and PLAN    Current Exercise Program in Rehab:       Frequency: 2 days/week        Minutes: 40-45        METS: 2 0-3 1              SpO2: >90%              RPD: 1-3                      HR: RHR +30    RPE: 3-4         Modalities: Treadmill, UBE, Elliptical, NuStep and Recumbent bike      Exercise Progression 30 Day Goals :    Frequency: 2 days/week        Minutes: 45-60         METS: 3 2-3 5              SpO2: >90%              RPD: 3-5                      HR: RHR +30   RPE: 4-5        Modalities: Treadmill, Airdyne bike, Elliptical, NuStep and Recumbent bike     Strength training:   Will be added following 2-3 weeks of monitored exercise sessions   Modalities: Leg Press, Chest Press, Pull Downs, Arm Curl and Seated Row    Oxygen Needs: on room air at rest  Oxygen Goal: Maintain SpO2>90% during exercise    Home Exercise: none, WIll start using home ELL and walking program following dischagre  Active around the house currently      Education: pursed lipped breathing, diaphragmatic breathing, relaxation breathing, home exercise and appropriate O2 response to exercise    Goals: Improved 6MW distance by 10% and reduced RPD at rest    Progressing:  Pt is progressing and showing improvement  toward the following goals:  increasing duration and workloads on exercise equipment, reduced dyspea with exertion, >90% SpO2 on room air at rest and exercise, able to walk logner distance, and increasing overall functional METs  , Pt has not made progress toward the following goals: no structured home exercise with rehab currently - staying active around the house  , Patient will start strength training program in the next 30 days  in the next 30 days, Will continue to educate and progress as tolerated  Plan: learn to conserve energy with ADLs , practice breathing techniques 3x/day and reduce time sitting at home    Readiness to change: Action:  (Changing behavior)      NUTRITION ASSESSMENT AND PLAN    Weight control:    Starting weight: 142   Current weight:  141 lbs     Diabetes: N/A    Goals:LDL <100, HDL >40, TRG <150, CHOL <200, choose lean meat (93-95%), eliminate processed meats, reduce portion sizes of meat to 3oz or less, increase intake of fish, shellfish and cook without added fat or use vegetable oil/spray  Education: heart healthy eating  low sodium diet  hydration  Progressing:Pt is progressing and showing improvement  toward the following goals:  continuing a healthy diet and maintaining current weight   , Will continue to educate and progress as tolerated       Plan: avoid processed foods, will try new grains like brown rice, quinoa, farro, will replace sugar sweetened cereals with whole grain or oatmeal, drink more water, learn how to read food labels, replace sugar with stevia or truvia and keep added daily sugar <25g/day  Readiness to change: Action:  (Changing behavior)      PSYCHOSOCIAL ASSESSMENT AND PLAN    Emotional:  Depression assessment:  PHQ-9 = 1-4 = Minimal Depression            Anxiety measure:  MARIO-7 = 0-4  = Not anxious  Self-reported stress level: 8   Social support: Excellent    Goals:  Physical Fitness in [facility] Score < 3, increased energy and take time to relax  Education: signs/sxs of depression, benefits of a positive support system and stress management techniques    Progressing:Pt is progressing and showing improvement  toward the following goals:  denies depression and anxiety  No concerns at this time  , Pt has not made progress toward the following goals: ongoing fatigue   , Will continue to educate and progress as tolerated  , patient has been managing any stress well     Plan: Class: Stress and Your Health, Practice relaxation techniques, Exercise, Spend time outdoors, Enjoy a hobby and Read a Book  Readiness to change: Action:  (Changing behavior)      OTHER CORE COMPONENTS     Tobacco:   Social History     Tobacco Use   Smoking Status Never Smoker   Smokeless Tobacco Never Used       Tobacco Use Intervention: Referral to tobacco expert:   N/A:  Patient is a non-smoker     Blood pressure:    Restin//88   Exercise: 118//80    Goals: consistent BP < 130/80, reduced dietary sodium <2300mg and moderate intensity exercise >150 mins/wk  Education:  pathophysiology of pulmonary disease     Progressing:Pt is progressing and showing improvement  toward the following goals:  watches sodium intake, consistent BP at rest and appropriate hemodynamic response to exercise, and staying hydrated,   , Pt has not made progress toward the following goals: no structured home exercise with rehab to achieve >150 mins/week of moderate intensity exercise (active around the house)  , Will continue to educate and progress as tolerated       Plan: Avoid Processed foods, engage in regular exercise and eliminate salt shaker at the table  Readiness to change: Action:  (Changing behavior)

## 2022-07-11 ENCOUNTER — APPOINTMENT (OUTPATIENT)
Dept: PULMONOLOGY | Facility: CLINIC | Age: 57
End: 2022-07-11
Payer: COMMERCIAL

## 2022-07-11 ENCOUNTER — CLINICAL SUPPORT (OUTPATIENT)
Dept: PULMONOLOGY | Facility: CLINIC | Age: 57
End: 2022-07-11
Payer: COMMERCIAL

## 2022-07-11 DIAGNOSIS — U09.9 POST-ACUTE SEQUELAE OF COVID-19 (PASC): Primary | ICD-10-CM

## 2022-07-11 PROCEDURE — 94625 PHY/QHP OP PULM RHB W/O MNTR: CPT

## 2022-07-13 ENCOUNTER — CLINICAL SUPPORT (OUTPATIENT)
Dept: PULMONOLOGY | Facility: CLINIC | Age: 57
End: 2022-07-13
Payer: COMMERCIAL

## 2022-07-13 ENCOUNTER — APPOINTMENT (OUTPATIENT)
Dept: PULMONOLOGY | Facility: CLINIC | Age: 57
End: 2022-07-13
Payer: COMMERCIAL

## 2022-07-13 DIAGNOSIS — U09.9 POST-ACUTE SEQUELAE OF COVID-19 (PASC): Primary | ICD-10-CM

## 2022-07-13 PROCEDURE — 94625 PHY/QHP OP PULM RHB W/O MNTR: CPT

## 2022-07-18 ENCOUNTER — CLINICAL SUPPORT (OUTPATIENT)
Dept: PULMONOLOGY | Facility: CLINIC | Age: 57
End: 2022-07-18
Payer: COMMERCIAL

## 2022-07-18 ENCOUNTER — APPOINTMENT (OUTPATIENT)
Dept: PULMONOLOGY | Facility: CLINIC | Age: 57
End: 2022-07-18
Payer: COMMERCIAL

## 2022-07-18 DIAGNOSIS — U09.9 POST-ACUTE SEQUELAE OF COVID-19 (PASC): Primary | ICD-10-CM

## 2022-07-18 PROCEDURE — 94625 PHY/QHP OP PULM RHB W/O MNTR: CPT

## 2022-07-20 ENCOUNTER — CLINICAL SUPPORT (OUTPATIENT)
Dept: PULMONOLOGY | Facility: CLINIC | Age: 57
End: 2022-07-20
Payer: COMMERCIAL

## 2022-07-20 ENCOUNTER — APPOINTMENT (OUTPATIENT)
Dept: PULMONOLOGY | Facility: CLINIC | Age: 57
End: 2022-07-20
Payer: COMMERCIAL

## 2022-07-20 DIAGNOSIS — U09.9 POST-ACUTE SEQUELAE OF COVID-19 (PASC): Primary | ICD-10-CM

## 2022-07-20 PROCEDURE — 94625 PHY/QHP OP PULM RHB W/O MNTR: CPT

## 2022-07-25 ENCOUNTER — APPOINTMENT (OUTPATIENT)
Dept: PULMONOLOGY | Facility: CLINIC | Age: 57
End: 2022-07-25
Payer: COMMERCIAL

## 2022-07-25 ENCOUNTER — CLINICAL SUPPORT (OUTPATIENT)
Dept: PULMONOLOGY | Facility: CLINIC | Age: 57
End: 2022-07-25
Payer: COMMERCIAL

## 2022-07-25 DIAGNOSIS — U09.9 POST-ACUTE SEQUELAE OF COVID-19 (PASC): Primary | ICD-10-CM

## 2022-07-25 PROCEDURE — 94625 PHY/QHP OP PULM RHB W/O MNTR: CPT

## 2022-07-27 ENCOUNTER — CLINICAL SUPPORT (OUTPATIENT)
Dept: PULMONOLOGY | Facility: CLINIC | Age: 57
End: 2022-07-27
Payer: COMMERCIAL

## 2022-07-27 ENCOUNTER — APPOINTMENT (OUTPATIENT)
Dept: PULMONOLOGY | Facility: CLINIC | Age: 57
End: 2022-07-27
Payer: COMMERCIAL

## 2022-07-27 DIAGNOSIS — U09.9 POST-ACUTE SEQUELAE OF COVID-19 (PASC): Primary | ICD-10-CM

## 2022-07-27 PROCEDURE — 94625 PHY/QHP OP PULM RHB W/O MNTR: CPT

## 2022-08-01 ENCOUNTER — APPOINTMENT (OUTPATIENT)
Dept: PULMONOLOGY | Facility: CLINIC | Age: 57
End: 2022-08-01
Payer: COMMERCIAL

## 2022-08-01 ENCOUNTER — CLINICAL SUPPORT (OUTPATIENT)
Dept: PULMONOLOGY | Facility: CLINIC | Age: 57
End: 2022-08-01
Payer: COMMERCIAL

## 2022-08-01 DIAGNOSIS — U09.9 POST-ACUTE SEQUELAE OF COVID-19 (PASC): Primary | ICD-10-CM

## 2022-08-01 PROCEDURE — 94625 PHY/QHP OP PULM RHB W/O MNTR: CPT

## 2022-08-03 ENCOUNTER — OFFICE VISIT (OUTPATIENT)
Dept: FAMILY MEDICINE CLINIC | Facility: CLINIC | Age: 57
End: 2022-08-03
Payer: COMMERCIAL

## 2022-08-03 ENCOUNTER — APPOINTMENT (OUTPATIENT)
Dept: PULMONOLOGY | Facility: CLINIC | Age: 57
End: 2022-08-03
Payer: COMMERCIAL

## 2022-08-03 VITALS
OXYGEN SATURATION: 98 % | WEIGHT: 140.2 LBS | SYSTOLIC BLOOD PRESSURE: 106 MMHG | BODY MASS INDEX: 22.53 KG/M2 | HEIGHT: 66 IN | RESPIRATION RATE: 16 BRPM | HEART RATE: 97 BPM | DIASTOLIC BLOOD PRESSURE: 78 MMHG | TEMPERATURE: 100.1 F

## 2022-08-03 DIAGNOSIS — R50.9 FEVER, UNSPECIFIED FEVER CAUSE: ICD-10-CM

## 2022-08-03 DIAGNOSIS — J02.9 SORE THROAT: Primary | ICD-10-CM

## 2022-08-03 LAB — S PYO AG THROAT QL: NEGATIVE

## 2022-08-03 PROCEDURE — 87880 STREP A ASSAY W/OPTIC: CPT | Performed by: FAMILY MEDICINE

## 2022-08-03 PROCEDURE — 99213 OFFICE O/P EST LOW 20 MIN: CPT | Performed by: FAMILY MEDICINE

## 2022-08-03 PROCEDURE — 87636 SARSCOV2 & INF A&B AMP PRB: CPT | Performed by: FAMILY MEDICINE

## 2022-08-03 NOTE — PROGRESS NOTES
Pulmonary Rehabilitation Plan of Care   90 Day Reassessment      Today's date: 8/3/2022   # of Exercise Sessions Completed: 18  Patient name: Antonio Paulino      : 1965  Age: 65 y o  MRN: [id number]  Referring Physician: Tiara Umana MD  Pulmonologist: Homero York MD   Provider: [facility]  Clinician: Miranda Burroughs MS      Dx:   Encounter Diagnosis   Name Primary? 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     Date of onset: 2020      SUMMARY OF PROGRESS:  Maxine Lynn is compliant attending pulmonary rehab exercise sessions 2x/wk and was recently out for a week due to vacation  The patient tolerates 45-55 mins at 2 0-3 53 METs room air  Resting SpO2 95-98% with decreased O2 saturation during exercise 94-98% on room air  Resting //72  with appropriate response to exercise reaching 122//70  The patient reports chest pain and dizziness during exercise  We have discontinued telemetry monitoring with patient  Rating of perceived dyspnea with exercise 0-4/10 at max METs  Pt discussed with cardiologist about adding electrolytes to help with lightheadedness, possibly due to dehydration  She wants to continue on with a structured home plan following exercise  She also has returned back to work with no concerns  She has not begun a regular exercise program at home, patient states walking a lot at work, gardening and completing errands regularly  Depression screening using the PHQ-9 and MARIO-7 was not reassessed due to no concerns, also due to patient not being present in rehab today  When addressed, the patient admits to  feelings of depression/anxiety/stress but manages it well  Patient reports excellent social/emotional support  Patient attends group educational classes on pulmonary disease  Pursed lipped breathing and Diaphragmatic breathing continues to be demonstrated, practiced, and reinforced with the patient  Her exercise program will be progressed as tolerated   The patient Is This A New Presentation, Or A Follow-Up?: Skin Lesion has the following personal goals she hopes to achieve by discharge: add at home exercise, using the walking path behind her house, increase stamina and strength, continue eating a healthy diet, and feel comfortable doing ADLs without feeling that it is dangerous  Patient is on track with her personal goals at 90 days, was unable to reassess any new goals as the patient was not present in rehab today  Pt will continue to be educated on lifestyle modifications and encouraged to supplement with a home exercise program to reach the following goals: increase stamina and strength and continue at home exercise  Medication compliance: Yes   Comments: Pt reports to be compliant with medications  Fall Risk: Low   Comments: Ambulates with a steady gait with no assist device    Smoking: Never used    RPD at Rest: 010  RPD with Exercise:  0-4/10    Assessment of progression of lung disease and functional status:  Shortness of breath questionnaire:       EXERCISE ASSESSMENT and PLAN    Current Exercise Program in Rehab:       Frequency: 2 days/week        Minutes: 45-55       METS: 2 0-3 53              SpO2: 94-98              RPD: 1-3                      HR: 101-120    RPE: 3-7         Modalities: Treadmill, UBE, Elliptical, NuStep and Recumbent bike      Exercise Progression 30 Day Goals :    Frequency: 2 days/week        Minutes: 45-60         METS: 3 2-3 5              SpO2: >90%              RPD: 3-5                      HR: RHR +30   RPE: 4-5        Modalities: Treadmill, Airdyne bike, Elliptical, NuStep and Recumbent bike     Strength trainin-3 days / week  12-15 repetitions  1-2 sets per modality    Modalities: Leg Press, Chest Press, Pull Downs, Arm Curl and Seated Row    Oxygen Needs: on room air at rest  Oxygen Goal: Maintain SpO2>90% during exercise    Home Exercise: none, WIll start using home ELL and walking program following dischagre   Active around the house currently      Education: pursed How Severe Is Your Skin Lesion?: mild lipped breathing, diaphragmatic breathing, relaxation breathing, home exercise and appropriate O2 response to exercise    Goals: Improved 6MW distance by 10% and reduced RPD at rest    Progressing:  Pt is progressing and showing improvement  toward the following goals:  increasing duration and workloads on exercise equipment, reduced dyspea with exertion, >90% SpO2 on room air at rest and exercise, able to walk logner distance, and increasing overall functional METs  , Pt has not made progress toward the following goals: no structured home exercise with rehab currently - staying active around the house  , Patient will start strength training program in the next 30 days  in the next 30 days, Will continue to educate and progress as tolerated  Plan: learn to conserve energy with ADLs , practice breathing techniques 3x/day and reduce time sitting at home    Readiness to change: Action:  (Changing behavior)      NUTRITION ASSESSMENT AND PLAN    Weight control:    Starting weight: 142 lbs   Current weight:  140 lbs     Diabetes: N/A    Goals:LDL <100, HDL >40, TRG <150, CHOL <200, choose lean meat (93-95%), eliminate processed meats, reduce portion sizes of meat to 3oz or less, increase intake of fish, shellfish and cook without added fat or use vegetable oil/spray  Education: heart healthy eating  low sodium diet  hydration  Progressing:Pt is progressing and showing improvement  toward the following goals:  continuing a healthy diet and maintaining current weight   , Will continue to educate and progress as tolerated       Plan: avoid processed foods, will try new grains like brown rice, quinoa, farro, will replace sugar sweetened cereals with whole grain or oatmeal, drink more water, learn how to read food labels, replace sugar with stevia or truvia and keep added daily sugar <25g/day  Readiness to change: Action:  (Changing behavior)      PSYCHOSOCIAL ASSESSMENT AND PLAN    Emotional:  Depression assessment:  PHQ-9 = 1-4 = Minimal Depression            Anxiety measure:  MARIO-7 = 0-4  = Not anxious  Self-reported stress level: 8   Social support: Excellent    Goals:  Physical Fitness in [facility] Score < 3, increased energy and take time to relax  Education: signs/sxs of depression, benefits of a positive support system and stress management techniques    Progressing:Pt is progressing and showing improvement  toward the following goals:  denies depression and anxiety  No concerns at this time  , Pt has not made progress toward the following goals: ongoing fatigue   , Will continue to educate and progress as tolerated  , patient has been managing any stress well     Plan: Class: Stress and Your Health, Practice relaxation techniques, Exercise, Spend time outdoors, Enjoy a hobby and Read a Book  Readiness to change: Action:  (Changing behavior)      OTHER CORE COMPONENTS     Tobacco:   Social History     Tobacco Use   Smoking Status Never Smoker   Smokeless Tobacco Never Used       Tobacco Use Intervention: Referral to tobacco expert:   N/A:  Patient is a non-smoker     Blood pressure:    Restin//88   Exercise: 118//80    Goals: consistent BP < 130/80, reduced dietary sodium <2300mg and moderate intensity exercise >150 mins/wk  Education:  pathophysiology of pulmonary disease     Progressing:Pt is progressing and showing improvement  toward the following goals:  watches sodium intake, consistent BP at rest and appropriate hemodynamic response to exercise, and staying hydrated,   , Pt has not made progress toward the following goals: no structured home exercise with rehab to achieve >150 mins/week of moderate intensity exercise (active around the house)  , Will continue to educate and progress as tolerated       Plan: Avoid Processed foods, engage in regular exercise and eliminate salt shaker at the table  Readiness to change: Action:  (Changing behavior) Has Your Skin Lesion Been Treated?: not been treated

## 2022-08-03 NOTE — PROGRESS NOTES
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)   - has had exposure to Flu, RSV and COVID  - script given to get tested for Flu (no Flu tests available in the office)   - f/u PRN           Subjective:    Patient ID: Rocio Love is a 65 y o  female  HPI   63yo F presents to the office for eval of sick s/s   - Monday night with scratchy throat - took Motrin   - woke up at 5am with "really bad HA"   - yesterday: "tired, but felt OK with Motrin on board"  - last night throat pain was a lot worse ("swallowing glass")  - this AM with Tc 101  - has been taking Motrin Q6  - (+) achy, "everything hurts"  - 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 (all 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)   - Strep NEG in the office today   - is concerned for Flu A   - UTD with COVID IMMs and Booster x1       The following portions of the patient's history were reviewed and updated as appropriate: allergies, current medications, past family history, past medical history, past social history, past surgical history and problem list     Review of Systems  as per HPI    Objective:  /78 (BP Location: Left arm, Patient Position: Sitting, Cuff Size: Standard)   Pulse 97   Temp 100 1 °F (37 8 °C) (Tympanic)   Resp 16   Ht 5' 6" (1 676 m)   Wt 63 6 kg (140 lb 3 2 oz)   LMP  (LMP Unknown)   SpO2 98%   BMI 22 63 kg/m²    Physical Exam  Vitals reviewed  Constitutional:       General: She is not in acute distress  Appearance: She is well-developed  She is not ill-appearing, toxic-appearing or diaphoretic  HENT:      Head: Normocephalic and atraumatic  Right Ear: Ear canal normal  No swelling  A middle ear effusion is present  Left Ear: Ear canal normal  No swelling  A middle ear effusion is present  Nose: No congestion  Mouth/Throat:      Mouth: No oral lesions  Pharynx: Uvula midline  Posterior oropharyngeal erythema present  No pharyngeal swelling, oropharyngeal exudate or uvula swelling  Tonsils: No tonsillar exudate  Eyes:      Extraocular Movements:      Right eye: Normal extraocular motion  Left eye: Normal extraocular motion  Conjunctiva/sclera: Conjunctivae normal    Cardiovascular:      Rate and Rhythm: Normal rate and regular rhythm  Heart sounds: Normal heart sounds  No murmur heard  No friction rub  No gallop  Pulmonary:      Effort: Pulmonary effort is normal  No respiratory distress  Breath sounds: Normal breath sounds  No stridor  No wheezing, rhonchi or rales  Abdominal:      Palpations: Abdomen is soft  Musculoskeletal:      Cervical back: Normal range of motion  Skin:     General: Skin is warm  Neurological:      General: No focal deficit present  Mental Status: She is alert and oriented to person, place, and time     Psychiatric:         Mood and Affect: Mood normal          Behavior: Behavior normal

## 2022-08-04 ENCOUNTER — TELEPHONE (OUTPATIENT)
Dept: FAMILY MEDICINE CLINIC | Facility: CLINIC | Age: 57
End: 2022-08-04

## 2022-08-04 LAB
FLUAV RNA RESP QL NAA+PROBE: NEGATIVE
FLUBV RNA RESP QL NAA+PROBE: NEGATIVE
SARS-COV-2 RNA RESP QL NAA+PROBE: POSITIVE

## 2022-08-04 NOTE — TELEPHONE ENCOUNTER
Patient called, she was seen last night by [facility] and she did a PCR for the patient  Today it came back positive  Marquis Loeratavia wants to know why she has to have another appointment, which is scheduled for tomorrow, why can't another doctor look at the notes and prescribe her medication  She has a really sore throat, she said it feels like glass  She is asking why the doctor couldn't call in the Paxlovid instead of her coming in again    Please advise ASAP

## 2022-08-05 ENCOUNTER — TELEMEDICINE (OUTPATIENT)
Dept: FAMILY MEDICINE CLINIC | Facility: CLINIC | Age: 57
End: 2022-08-05
Payer: COMMERCIAL

## 2022-08-05 VITALS — HEIGHT: 66 IN | WEIGHT: 140 LBS | BODY MASS INDEX: 22.5 KG/M2

## 2022-08-05 DIAGNOSIS — J02.9 SORE THROAT: ICD-10-CM

## 2022-08-05 DIAGNOSIS — U07.1 COVID-19: Primary | ICD-10-CM

## 2022-08-05 PROCEDURE — 99213 OFFICE O/P EST LOW 20 MIN: CPT | Performed by: FAMILY MEDICINE

## 2022-08-05 PROCEDURE — 3725F SCREEN DEPRESSION PERFORMED: CPT | Performed by: FAMILY MEDICINE

## 2022-08-05 NOTE — PROGRESS NOTES
COVID-19 Outpatient Progress Note    Assessment/Plan:  Problem List Items Addressed This Visit    None     Visit Diagnoses     COVID-19    -  Primary    Relevant Medications    nirmatrelvir & ritonavir (Paxlovid) tablet therapy pack    Sore throat        Relevant Medications    phenol (CHLORASEPTIC) 1 4 % mucosal liquid         Disposition:     Patient has COVID-19 infection  Based off CDC guidelines, they were recommended to isolate for 5 days from the date of the positive test  If they remain asymptomatic, isolation may be ended followed by 5 days of wearing a mask when around othes to minimize risk of infecting others  If they have a fever, continue to stay home until fever resolves for at least 24 hours  Discussed symptom directed medication options with patient  Patient meets criteria for PAXLOVID and they have been counseled appropriately according to EUA documentation released by the FDA  After discussion, patient agrees to treatment  Tori Collie is an investigational medicine used to treat mild-to-moderate COVID-19 in adults and children (15years of age and older weighing at least 80 pounds (40 kg)) with positive results of direct SARS-CoV-2 viral testing, and who are at high risk for progression to severe COVID-19, including hospitalization or death  PAXLOVID is investigational because it is still being studied  There is limited information about the safety and effectiveness of using PAXLOVID to treat people with mild-to-moderate COVID-19  The FDA has authorized the emergency use of PAXLOVID for the treatment of mild-tomoderate COVID-19 in adults and children (15years of age and older weighing at least 80 pounds (40 kg)) with a positive test for the virus that causes COVID-19, and who are at high risk for progression to severe COVID-19, including hospitalization or death, under an EUA  What should I tell my healthcare provider before I take PAXLOVID?     Tell your healthcare provider if you:  - Have any allergies  - Have liver or kidney disease  - Are pregnant or plan to become pregnant  - Are breastfeeding a child  - Have any serious illnesses    Tell your healthcare provider about all the medicines you take, including prescription and over-the-counter medicines, vitamins, and herbal supplements  Some medicines may interact with PAXLOVID and may cause serious side effects  Keep a list of your medicines to show your healthcare provider and pharmacist when you get a new medicine  You can ask your healthcare provider or pharmacist for a list of medicines that interact with PAXLOVID  Do not start taking a new medicine without telling your healthcare provider  Your healthcare provider can tell you if it is safe to take PAXLOVID with other medicines  Tell your healthcare provider if you are taking combined hormonal contraceptive  PAXLOVID may affect how your birth control pills work  Females who are able to become pregnant should use another effective alternative form of contraception or an additional barrier method of contraception  Talk to your healthcare provider if you have any questions about contraceptive methods that might be right for you  How do I take PAXLOVID? PAXLOVID consists of 2 medicines: nirmatrelvir and ritonavir  - Take 2 pink tablets of nirmatrelvir with 1 white tablet of ritonavir by mouth 2 times each day (in the morning and in the evening) for 5 days  For each dose, take all 3 tablets at the same time  - If you have kidney disease, talk to your healthcare provider  You may need a different dose  - Swallow the tablets whole  Do not chew, break, or crush the tablets  - Take PAXLOVID with or without food  - Do not stop taking PAXLOVID without talking to your healthcare provider, even if you feel better  - If you miss a dose of PAXLOVID within 8 hours of the time it is usually taken, take it as soon as you remember   If you miss a dose by more than 8 hours, skip the missed dose and take the next dose at your regular time  Do not take 2 doses of PAXLOVID at the same time  - If you take too much PAXLOVID, call your healthcare provider or go to the nearest hospital emergency room right away  - If you are taking a ritonavir- or cobicistat-containing medicine to treat hepatitis C or Human Immunodeficiency Virus (HIV), you should continue to take your medicine as prescribed by your healthcare provider   - Talk to your healthcare provider if you do not feel better or if you feel worse after 5 days  Who should generally not take PAXLOVID? Do not take PAXLOVID if:  You are allergic to nirmatrelvir, ritonavir, or any of the ingredients in PAXLOVID  You are taking any of the following medicines:  - Alfuzosin  - Pethidine, piroxicam, propoxyphene  - Ranolazine  - Amiodarone, dronedarone, flecainide, propafenone, quinidine  - Colchicine  - Lurasidone, pimozide, clozapine  - Dihydroergotamine, ergotamine, methylergonovine  - Lovastatin, simvastatin  - Sildenafil (Revatio®) for pulmonary arterial hypertension (PAH)  - Triazolam, oral midazolam  - Apalutamide  - Carbamazepine, phenobarbital, phenytoin  - Rifampin  - St  Quintons Wort (hypericum perforatum)    What are the important possible side effects of PAXLOVID? Possible side effects of PAXLOVID are:  - Liver Problems  Tell your healthcare provider right away if you have any of these signs and symptoms of liver problems: loss of appetite, yellowing of your skin and the whites of eyes (jaundice), dark-colored urine, pale colored stools and itchy skin, stomach area (abdominal) pain  - Resistance to HIV Medicines  If you have untreated HIV infection, PAXLOVID may lead to some HIV medicines not working as well in the future  - Other possible side effects include: altered sense of taste, diarrhea, high blood pressure, or muscle aches    These are not all the possible side effects of PAXLOVID  Not many people have taken PAXLOVID  Serious and unexpected side effects may happen  Migel Higgins is still being studied, so it is possible that all of the risks are not known at this time  What other treatment choices are there? Like Regina Lamas may allow for the emergency use of other medicines to treat people with COVID-19  Go to https://[employer]/ for information on the emergency use of other medicines that are authorized by FDA to treat people with COVID-19  Your healthcare provider may talk with you about clinical trials for which you may be eligible  It is your choice to be treated or not to be treated with PAXLOVID  Should you decide not to receive it or for your child not to receive it, it will not change your standard medical care  What if I am pregnant or breastfeeding? There is no experience treating pregnant women or breastfeeding mothers with PAXLOVID  For a mother and unborn baby, the benefit of taking PAXLOVID may be greater than the risk from the treatment  If you are pregnant, discuss your options and specific situation with your healthcare provider  It is recommended that you use effective barrier contraception or do not have sexual activity while taking PAXLOVID  If you are breastfeeding, discuss your options and specific situation with your healthcare provider  How do I report side effects with PAXLOVID? Contact your healthcare provider if you have any side effects that bother you or do not go away  Report side effects to FDA MedWatch at www fda gov/medwatch or call 4-875-LIY1888 or you can report side effects to [facility]  at the contact information provided below  Website Fax number Telephone number   [employer] [phone]     How should I store Migel Higgins? Store PAXLOVID tablets at room temperature between 68°F to 77°F (20°C to 25°C)      Full fact sheet for patients, parents, and caregivers can be found at: [employer]    I have spent 15 minutes directly with the patient  Greater than 50% of this time was spent in counseling/coordination of care regarding: diagnostic results, prognosis, risks and benefits of treatment options, instructions for management, patient and family education, importance of treatment compliance, risk factor reductions and impressions  Encounter provider Dinaa Marie DO    Provider located at [address] [place] [phone]    Recent Visits  Date Type Provider Dept   08/04/22 Telephone Karie Rodarte MD [facility]   08/03/22 Office Visit Diana Marie DO [facility]   Showing recent visits within past 7 days and meeting all other requirements  Today's Visits  Date Type Provider Dept   08/05/22 Telemedicine Nilsa Lei DO [facility]   Showing today's visits and meeting all other requirements  Future Appointments  No visits were found meeting these conditions  Showing future appointments within next 150 days and meeting all other requirements       Patient agrees to participate in a virtual check in via telephone or video visit instead of presenting to the office to address urgent/immediate medical needs  Patient is aware this is a billable service  After connecting through [facility], the patient was identified by name and date of birth  Guillaume Brito was informed that this was a telemedicine visit and that the exam was being conducted confidentially over secure lines  My office door was closed  No one else was in the room  Guillaume Brito acknowledged consent and understanding of privacy and security of the telemedicine visit  I informed the patient that I have reviewed her record in Epic and presented the opportunity for her to ask any questions regarding the visit today  The patient agreed to participate      Verification of patient location:  Patient is located in the following state in which I hold an active license: [place]    Subjective:   Sujey Hodges is a 65 y o  female who has been screened for COVID-19  Symptom change since last report: unchanged  Patient's symptoms include fever, malaise, sore throat, cough, myalgias and headache      - Date of symptom onset: 2022  - Date of positive COVID-19 test: 8/3/2022  Type of test: PCR  COVID-19 vaccination status: Fully vaccinated with booster    Madison Lee has been staying home and has isolated themselves in her home  She is taking care to not share personal items and is cleaning all surfaces that are touched often, like counters, tabletops, and doorknobs using household cleaning sprays or wipes  She is wearing a mask when she leaves her room       - was seen in the office on 8/3/2022 and pt was concerned for Flu   -  evening with scratchy throat - took Motrin   - woke up at 5am with "really bad HA"   - : "tired, but felt OK with Motrin on board"  - (+) throat pain was a lot worse ("swallowing glass")  - 8/3: this AM with Tc 101, (+) achy, "everything hurts"  - 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 (all NEG)   - Strep NEG in the office 8/3/2022   - UTD with COVID IMMs and Booster x1   - sent for COVID/Flu swab and tested COVID POS on PCR   - has been taking Tylenol/Motrin   - VS (8am): /76, HR 78, 99% RA    Lab Results   Component Value Date    SARSCOV2 Positive (A) 2022    SARSCOV2 Detected (A) 2020     Past Medical History:   Diagnosis Date    Allergic rhinitis     Resolved 2017     Cervical neuritis     COVID-19         Disease of thyroid gland     Hashimoto's thyroiditis     Migraine 2020    Daily headaches    Miscarriage 1996    Primary localized osteoarthritis of right knee 2018    Sleep apnea     Varicella     As a child    Vitamin D deficiency      Past Surgical History:   Procedure Laterality Date     [facility]    KNEE SURGERY       Current Outpatient Medications   Medication Sig Dispense Refill    levothyroxine 75 mcg tablet Take 75 mcg by mouth daily      nirmatrelvir & ritonavir (Paxlovid) tablet therapy pack Take 3 tablets by mouth 2 (two) times a day for 5 days Take 2 nirmatrelvir tablets + 1 ritonavir tablet together per dose 30 tablet 0    phenol (CHLORASEPTIC) 1 4 % mucosal liquid Apply 1 spray to the mouth or throat every 2 (two) hours as needed (sore throat) 118 mL 0    Calcium Carbonate (CALTRATE 600 PO) Take by mouth (Patient not taking: No sig reported)      Cholecalciferol (Vitamin D-3) 125 MCG (5000 UT) TABS  (Patient not taking: No sig reported)      lidocaine (Lidoderm) 5 % Apply 1 patch topically in the morning  Remove & Discard patch within 12 hours or as directed by MD  (Patient not taking: No sig reported) 15 patch 0    pyridoxine (VITAMIN B6) 100 mg tablet  (Patient not taking: No sig reported)       No current facility-administered medications for this visit  No Known Allergies    Review of Systems   Constitutional: Positive for fever  HENT: Positive for sore throat  Respiratory: Positive for cough  Musculoskeletal: Positive for myalgias  Neurological: Positive for headaches  Objective:  Vitals:    08/05/22 0817   Weight: 63 5 kg (140 lb)   Height: 5' 6" (1 676 m)     Physical Exam   It was my intent to perform this visit via video technology but the patient was not able to do a video connection so the visit was completed via audio telephone only  VIRTUAL VISIT DISCLAIMER    Pilo Pascual verbally agrees to participate in [employer]   Pt is aware that [employer] could be limited without vital signs or the ability to perform a full hands-on physical Анна Ma understands she or the provider may request at any time to terminate the video visit and request the patient to seek care or treatment in person

## 2022-08-08 ENCOUNTER — APPOINTMENT (OUTPATIENT)
Dept: PULMONOLOGY | Facility: CLINIC | Age: 57
End: 2022-08-08
Payer: COMMERCIAL

## 2022-08-10 ENCOUNTER — TELEPHONE (OUTPATIENT)
Dept: PULMONOLOGY | Facility: CLINIC | Age: 57
End: 2022-08-10

## 2022-08-10 ENCOUNTER — APPOINTMENT (OUTPATIENT)
Dept: PULMONOLOGY | Facility: CLINIC | Age: 57
End: 2022-08-10
Payer: COMMERCIAL

## 2022-08-15 ENCOUNTER — CLINICAL SUPPORT (OUTPATIENT)
Dept: PULMONOLOGY | Facility: CLINIC | Age: 57
End: 2022-08-15
Payer: COMMERCIAL

## 2022-08-15 DIAGNOSIS — U09.9 POST-ACUTE SEQUELAE OF COVID-19 (PASC): Primary | ICD-10-CM

## 2022-08-15 PROCEDURE — 94625 PHY/QHP OP PULM RHB W/O MNTR: CPT

## 2022-08-17 ENCOUNTER — CLINICAL SUPPORT (OUTPATIENT)
Dept: PULMONOLOGY | Facility: CLINIC | Age: 57
End: 2022-08-17
Payer: COMMERCIAL

## 2022-08-17 DIAGNOSIS — U09.9 POST-ACUTE SEQUELAE OF COVID-19 (PASC): Primary | ICD-10-CM

## 2022-08-17 PROCEDURE — 94625 PHY/QHP OP PULM RHB W/O MNTR: CPT

## 2022-08-22 ENCOUNTER — CLINICAL SUPPORT (OUTPATIENT)
Dept: PULMONOLOGY | Facility: CLINIC | Age: 57
End: 2022-08-22
Payer: COMMERCIAL

## 2022-08-22 DIAGNOSIS — U09.9 POST-ACUTE SEQUELAE OF COVID-19 (PASC): Primary | ICD-10-CM

## 2022-08-22 PROCEDURE — 94625 PHY/QHP OP PULM RHB W/O MNTR: CPT

## 2022-08-24 ENCOUNTER — CLINICAL SUPPORT (OUTPATIENT)
Dept: PULMONOLOGY | Facility: CLINIC | Age: 57
End: 2022-08-24
Payer: COMMERCIAL

## 2022-08-24 DIAGNOSIS — U09.9 POST-ACUTE SEQUELAE OF COVID-19 (PASC): Primary | ICD-10-CM

## 2022-08-24 PROCEDURE — 94625 PHY/QHP OP PULM RHB W/O MNTR: CPT

## 2022-08-29 ENCOUNTER — CLINICAL SUPPORT (OUTPATIENT)
Dept: PULMONOLOGY | Facility: CLINIC | Age: 57
End: 2022-08-29
Payer: COMMERCIAL

## 2022-08-29 DIAGNOSIS — U09.9 POST-ACUTE SEQUELAE OF COVID-19 (PASC): Primary | ICD-10-CM

## 2022-08-29 PROCEDURE — 94625 PHY/QHP OP PULM RHB W/O MNTR: CPT

## 2022-08-31 ENCOUNTER — CLINICAL SUPPORT (OUTPATIENT)
Dept: PULMONOLOGY | Facility: CLINIC | Age: 57
End: 2022-08-31
Payer: COMMERCIAL

## 2022-08-31 DIAGNOSIS — U09.9 POST-ACUTE SEQUELAE OF COVID-19 (PASC): Primary | ICD-10-CM

## 2022-08-31 PROCEDURE — 94625 PHY/QHP OP PULM RHB W/O MNTR: CPT

## 2022-08-31 NOTE — PROGRESS NOTES
Pulmonary Rehabilitation Plan of Care   Discharge      Today's date: 2022   # of Exercise Sessions Completed: 24  Patient name: Brigitte Linares      : 1965  Age: 65 y o  MRN: [id number]  Referring Physician: Emma Staton MD  Pulmonologist: Marlene Hernandez MD   Provider: [facility]  Clinician: Valorie Cisneros MS      Dx:   Encounter Diagnosis   Name Primary? 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     Date of onset: 2020      SUMMARY OF PROGRESS:  Lori has been discharged from the pulmonary rehab program and attended her  sessions 2x/wk  The patient tolerates 45-55 mins at 2 0-3 9 METs room air  Resting SpO2 95-98% with decreased O2 saturation during exercise 94-98% on room air  Resting //72  with appropriate response to exercise reaching 122//70  The patient reports chest pain and dizziness during exercise  We have discontinued telemetry monitoring with patient due to no chest pain reported  Rating of perceived dyspnea with exercise 0-4/10 at max METs  She completed her post submax 6WMT walking 1410 feet, no rest break, 3 0 METs, SpO2 92-99% on room air, RPE: 3, and RPD: 2  She improved her distance by 2 2% and improved overall RPE and RPD from initial  She also has returned back to work with no concerns  She did very well through the program and increased endurance and strength overall  She reports increased confidence/limits which was a huge goal for her  She has not begun a regular exercise program at home, patient states walking a lot at work, gardening and completing errands regularly  She will continue on with structured home exercise on the [facility], home TRM, and weight training program     Depression screening using the PHQ-9 scoring 1-4 = Minimal Depression and MARIO-7 scoring 0-4  = Not anxious    When addressed, the patient admits to feelings of depression/anxiety/stress but manages it well and has improved since starting the program  Patient reports excellent social/emotional support  Patient attended educational classes on pulmonary disease  The patient has the following personal goals she hopes to achieve by discharge: add at home exercise, using the walking path behind her house, increase stamina and strength, continue eating a healthy diet, and feel comfortable doing ADLs without feeling that it is dangerous  Patient has met her goals at discharge  She plans to continue on with home exercise with home equipment, no concerns with walking/went on vacation, increased overall strength and stamina, no concerns with ADLs, and reports heart healthy diet with high scores on RYP  She improved overall in her outcomes report (see attached) and reviewed discharge instructions and verbalized understanding       Medication compliance: Yes   Comments: Pt reports to be compliant with medications  Fall Risk: Low   Comments: Ambulates with a steady gait with no assist device    Smoking: Never used    RPD at Rest: 0/10  RPD with Exercise:  0-4/10    Assessment of progression of lung disease and functional status:  Shortness of breath questionnaire: 15/120      EXERCISE ASSESSMENT and PLAN    Current Exercise Program in Rehab:       Frequency: 2 days/week        Minutes: 45-55       METS: 2 0-3 9              SpO2: 94-98              RPD: 1-3                      HR: 101-120    RPE: 3-7         Modalities: Treadmill, UBE, Elliptical, NuStep and Recumbent bike      Exercise Progression after Discharge: ELL, TRM, weights, walking    Frequency: 3-5 days of gym or home exercise   Minutes: 30-50  >150 mins/wk of moderate intensity exercise   METS:  2 0-4 5   HR: RHR +30 beats above    RPE: 4-6   Modalities: Treadmill, Elliptical and weights and walking program     Strength trainin-3 days / week  12-15 repetitions  1-2 sets per modality    Modalities: Leg Press, Chest Press, Pull Downs, Arm Curl and Seated Row    Oxygen Needs: on room air at rest  Oxygen Goal: Maintain SpO2>90% during exercise    Home Exercise: Frequency: 3-5 days/week, Duration: 30-50 mins, ELL, TRM, walking program, and weight training      Education: pursed lipped breathing, diaphragmatic breathing, relaxation breathing, home exercise and appropriate O2 response to exercise    Goals: Improved 6MW distance by 10% and reduced RPD at rest    Progressing:  Goals not met: did not achieve 10% improvement on 6MWT - reduced RPD, RPE, increased walking distance by 30 feet, and Spo2 >90%  , Goals met: improved overall exercise tolerance, confidence, strength and endurance, SpO2 >90% on room air at rest and exercise, home structured program following discharge, back to ADLs with no concerns, and weight training program completed   , Patient will be encouraged to focus on lifestyle modifications following discharge  Plan: learn to conserve energy with ADLs , practice breathing techniques 3x/day and reduce time sitting at home    Readiness to change: Action:  (Changing behavior)      NUTRITION ASSESSMENT AND PLAN    Weight control:    Starting weight: 142 lbs   Current weight:  141 lbs     Diabetes: N/A    Goals:LDL <100, HDL >40, TRG <150, CHOL <200, choose lean meat (93-95%), eliminate processed meats, reduce portion sizes of meat to 3oz or less, increase intake of fish, shellfish and cook without added fat or use vegetable oil/spray  Education: heart healthy eating  low sodium diet  hydration  Progressing:Goals met: RYP score still high score at post, making heart healthy changes, and weight maintained with no concerns   , Patient will be encouraged to focus on lifestyle modifications following discharge       Plan: avoid processed foods, will try new grains like brown rice, quinoa, farro, will replace sugar sweetened cereals with whole grain or oatmeal, drink more water, learn how to read food labels, replace sugar with stevia or truvia and keep added daily sugar <25g/day  Readiness to change: Action:  (Changing behavior)      PSYCHOSOCIAL ASSESSMENT AND PLAN    Emotional:  Depression assessment:  PHQ-9 = 1-4 = Minimal Depression            Anxiety measure:  MARIO-7 = 0-4  = Not anxious  Self-reported stress level: 8   Social support: Excellent    Goals:  Physical Fitness in [facility] Score < 3, increased energy and take time to relax  Education: signs/sxs of depression, benefits of a positive support system and stress management techniques    Progressing:Goals met: denies depression and anxiety, managing stress well than from initial, reports overall confidence, [facility] overall score improved, and great support system   , Patient will be encouraged to focus on lifestyle modifications following discharge  Plan: Class: Stress and Your Health, Practice relaxation techniques, Exercise, Spend time outdoors, Enjoy a hobby and Read a Book  Readiness to change: Action:  (Changing behavior)      OTHER CORE COMPONENTS     Tobacco:   Social History     Tobacco Use   Smoking Status Never Smoker   Smokeless Tobacco Never Used       Tobacco Use Intervention: Referral to tobacco expert:   N/A:  Patient is a non-smoker     Blood pressure:    [place]//88   Exercise: 118//80    Goals: consistent BP < 130/80, reduced dietary sodium <2300mg and moderate intensity exercise >150 mins/wk  Education:  pathophysiology of pulmonary disease     Progressing:Goals met: medication compliance, BPs within normal limits at rest and exercise with no concerns, low sodium intake, and achieving >150 mins/week of moderarte intensity exercise with home exercise plan and rehab  , Patient will be encouraged to focus on lifestyle modifications following discharge       Plan: Avoid Processed foods, engage in regular exercise and eliminate salt shaker at the table  Readiness to change: Action:  (Changing behavior)

## 2022-10-11 PROBLEM — Z13.1 SCREENING FOR DIABETES MELLITUS: Status: RESOLVED | Noted: 2018-10-04 | Resolved: 2022-10-11

## 2022-10-12 PROBLEM — Z11.59 NEED FOR HEPATITIS C SCREENING TEST: Status: RESOLVED | Noted: 2021-11-29 | Resolved: 2022-10-12

## 2022-10-21 ENCOUNTER — OFFICE VISIT (OUTPATIENT)
Dept: PULMONOLOGY | Facility: CLINIC | Age: 57
End: 2022-10-21
Payer: COMMERCIAL

## 2022-10-21 VITALS
OXYGEN SATURATION: 98 % | WEIGHT: 141.6 LBS | HEART RATE: 87 BPM | TEMPERATURE: 97.7 F | HEIGHT: 66 IN | BODY MASS INDEX: 22.76 KG/M2 | DIASTOLIC BLOOD PRESSURE: 74 MMHG | SYSTOLIC BLOOD PRESSURE: 110 MMHG

## 2022-10-21 DIAGNOSIS — R13.19 OTHER DYSPHAGIA: ICD-10-CM

## 2022-10-21 DIAGNOSIS — R06.02 SOB (SHORTNESS OF BREATH) ON EXERTION: Primary | ICD-10-CM

## 2022-10-21 DIAGNOSIS — R05.3 CHRONIC COUGH: ICD-10-CM

## 2022-10-21 PROCEDURE — 94010 BREATHING CAPACITY TEST: CPT | Performed by: INTERNAL MEDICINE

## 2022-10-21 PROCEDURE — 99215 OFFICE O/P EST HI 40 MIN: CPT | Performed by: INTERNAL MEDICINE

## 2022-10-21 NOTE — PROGRESS NOTES
Pulmonary Follow Up Note   Brian Arboleda 65 y o  female MRN: [id number]  10/21/2022      Assessment:  1  Shortness of breath  · Post COVID - normal spirometry, normal CT imaging, improved with pulmonary rehab - suspect deconditioning component now resolved    2  Chronic Cough  · Spirometry normal, noted upper airway inflammation and dysphagia likely subclinical GERD symptoms  · Plan treatments as listed below and monitor response    3  Allergic Rhinitis   · Start trial of Afrin x 2 days  · Start nasal saline irrigation daily  · Change flonase to nasonex and instill after saline irrigation  · Continue OTC antihistamine with allegra or Zyrtec    4  Dysphagia  · Start 2-4 weeks of OTC PPI  · Nonpharmacologic GERD therapies  · Check upper GI given pill/solid dysphagia  · Refer to GI for endoscopy    5  DARON - plan to reschedule CPAP titration     Follow up in 3 months or sooner as needed    Plan:    Diagnoses and all orders for this visit:    SOB (shortness of breath) on exertion    Chronic cough  -     POCT spirometry    Other dysphagia  -     FL UPPER GI UGI; Future  -     Ambulatory Referral to Gastroenterology; Future        Return in about 3 months (around 1/21/2023) for Recheck  History of Present Illness   HPI:  Brian Arboleda is a 65 y o  female who has DARON, hypothyroidism/Hashimoto's thyroiditis, migraine HA, and COVID-19 infection in late 2020  She was seen by Dr Kathy Beyer in April 2022 for evaluation of dyspnea, dizziness and fatigue since her COVID-19 infection  Plans in April were to have CPAP titration and initiate pulmonary rehab  She presents today for follow up  She completed pulmonary rehab in August 2022 and had completion 6MWT distance of 430 meters, no desaturations  She had repeat COVID-19 infection in August 2022 (8/3/2022 +) and was given Paxlovid  She reports her dyspnea has resolved  She noted improvement with pulmonary rehab  She reports continued chronic mostly NP cough   She does have post nasal drainage chronically  She reports increased dysphagia symptoms and intermittent mid sternal chest pain - seen in ED with negative eval as listed below  She reports pill dysphagia and food getting stuck, she denied liquid dysphagia or gross as aspiration events  Additional social history:  Non-smoker  Pediatric Nurse  Pet Cat    Review of Systems   Constitutional: Negative for activity change, appetite change and fever  HENT: Positive for ear pain, postnasal drip, sore throat and trouble swallowing  Negative for mouth sores, rhinorrhea and sneezing  Respiratory: Positive for cough  Negative for chest tightness, shortness of breath and wheezing  Cardiovascular: Negative for chest pain and leg swelling  Gastrointestinal: Negative for abdominal pain, nausea and vomiting  Musculoskeletal: Negative for myalgias  Allergic/Immunologic: Positive for environmental allergies  Negative for immunocompromised state  Neurological: Positive for headaches  Hematological: Negative for adenopathy  Psychiatric/Behavioral: Negative for sleep disturbance         Historical Information   Past Medical History:   Diagnosis Date   • Allergic rhinitis     Resolved 2017    • Cervical neuritis    • COVID-19        • Disease of thyroid gland    • Hashimoto's thyroiditis    • Migraine 2020    Daily headaches   • Miscarriage    • Primary localized osteoarthritis of right knee 2018   • Sleep apnea    • Varicella     As a child   • Vitamin D deficiency      Past Surgical History:   Procedure Laterality Date   •  SECTION     • DILATION AND CURETTAGE OF UTERUS     • KNEE SURGERY       Family History   Problem Relation Age of Onset   • Hypertension Mother    • Hyperlipidemia Mother    • Osteoarthritis Mother    • Thyroid disease Mother    • Hyperthyroidism Mother    • Asthma Mother    • Heart attack Father    • Heart failure Father          from heart attack  at age 68   • No Known Problems Sister    • No Known Problems Brother    • No Known Problems Daughter    • Heart block Son    • Arthritis Family    • Osteoporosis Family          Meds/Allergies     Current Outpatient Medications:   •  Calcium Carbonate (CALTRATE 600 PO), Take by mouth, Disp: , Rfl:   •  levothyroxine 75 mcg tablet, Take 75 mcg by mouth daily, Disp: , Rfl:   •  lidocaine (Lidoderm) 5 %, Apply 1 patch topically in the morning  Remove & Discard patch within 12 hours or as directed by MD  (Patient not taking: No sig reported), Disp: 15 patch, Rfl: 0  •  pyridoxine (VITAMIN B6) 100 mg tablet, , Disp: , Rfl:   No Known Allergies    Vitals: Blood pressure 110/74, pulse 87, temperature 97 7 °F (36 5 °C), height 5' 6" (1 676 m), weight 64 2 kg (141 lb 9 6 oz), SpO2 98 %  Body mass index is 22 85 kg/m²  Oxygen Therapy  SpO2: 98 %  Oxygen Therapy: None (Room air)      Physical Exam  Physical Exam  Vitals reviewed  Constitutional:       General: She is not in acute distress  Appearance: Normal appearance  She is well-developed and normal weight  She is not ill-appearing, toxic-appearing or diaphoretic  HENT:      Head: Normocephalic and atraumatic  Right Ear: External ear normal       Left Ear: External ear normal       Nose: Congestion and rhinorrhea present  Comments: Nasal turbinate bogginess and inflammation     Mouth/Throat:      Mouth: Mucous membranes are moist       Pharynx: No oropharyngeal exudate  Comments: Mild-mod posterior pharyngeal cobblestoning  Eyes:      General: No scleral icterus  Right eye: No discharge  Left eye: No discharge  Conjunctiva/sclera: Conjunctivae normal       Pupils: Pupils are equal, round, and reactive to light  Neck:      Vascular: No JVD  Trachea: No tracheal deviation  Cardiovascular:      Rate and Rhythm: Normal rate and regular rhythm  Heart sounds: Normal heart sounds  No murmur heard  No gallop     Pulmonary: Effort: Pulmonary effort is normal  No respiratory distress  Breath sounds: Normal breath sounds  No stridor  No wheezing, rhonchi or rales  Abdominal:      General: Bowel sounds are normal  There is no distension  Palpations: Abdomen is soft  Tenderness: There is no abdominal tenderness  There is no guarding or rebound  Musculoskeletal:         General: No deformity  Right lower leg: No edema  Left lower leg: No edema  Lymphadenopathy:      Cervical: No cervical adenopathy  Skin:     General: Skin is warm and dry  Coloration: Skin is not jaundiced  Findings: No erythema or rash  Neurological:      General: No focal deficit present  Mental Status: She is alert and oriented to person, place, and time  Mental status is at baseline  Psychiatric:         Mood and Affect: Mood normal          Behavior: Behavior normal          Thought Content: Thought content normal          Labs: I have personally reviewed pertinent lab results  Lab Results   Component Value Date    WBC 8 13 05/16/2022    HGB 15 2 05/16/2022    HCT 45 9 05/16/2022    MCV 95 05/16/2022     05/16/2022     Lab Results   Component Value Date    GLUCOSE 103 02/11/2015    CALCIUM 9 9 05/16/2022     09/12/2017    K 4 0 05/16/2022    CO2 28 05/16/2022     05/16/2022    BUN 15 05/16/2022    CREATININE 0 81 05/16/2022     No results found for: IGE  Lab Results   Component Value Date    ALT 19 05/16/2022    AST 20 05/16/2022    ALKPHOS 70 05/16/2022    BILITOT 0 6 09/12/2017       Imaging and other studies: I have personally reviewed pertinent reports     and I have personally reviewed pertinent films in PACS  CT angio 5/16/2022 - no PE, lungs clear, no infiltrates, no effusions, no interstitial changes, no sig mediastinal adenopathy, no changes from CT angio on 3/24/2022    Pulmonary function testing:   10/21/2022 - Spirometry - Ratio 86%, FVC 3 41L (93%), FEV1 2 92L (102%) - normal spirometry    4/12/2022 - 6MWT on RA - total walk distance 414 meters with lowest SpO2 97%    Diagnostic PSG - 2/11/2022 - AHI 7 2/hr, supine AHI 29 5, REM 20 3, lowest SpO2 84%    EKG, Pathology, and Other Studies: I have personally reviewed pertinent reports  TTE 6/2021 - EF 55-60%, normal RV size/function    Moo Perez DO, FACP  [facility]    Answers for HPI/ROS submitted by the patient on 10/14/2022  Do you experience frequent throat clearing?: Yes  Chronicity: chronic  When did you first notice your symptoms?: more than 1 year ago  How often do your symptoms occur?: daily  Since you first noticed this problem, how has it changed?: unchanged  Do you have shortness of breath that occurs with effort or exertion?: Yes  Do you have ear congestion?: Yes  Do you have heartburn?: No  Do you have fatigue?: No  Do you have nasal congestion?: Yes  Do you have shortness of breath when lying flat?: No  Do you have shortness of breath when you wake up?: No  Do you have sweats?: No  Have you experienced weight loss?: No  Which of the following makes your symptoms worse?: nothing  Which of the following makes your symptoms better?: nothing  Risk factors for lung disease: animal exposure

## 2022-10-21 NOTE — PATIENT INSTRUCTIONS
Change flonase to nasonex  Start afrin each nostril twice daily x 2 days  Start Nasal saline irrigation once daily (after afrin, prior to nasonex)  Continue OTC allegra or zyrtec  Start OTC omeprazole 20mg prior to meal once a day  Get upper GI evaluation and see gastroenterologis - Dr Myrna Lara referral

## 2022-10-26 ENCOUNTER — CONSULT (OUTPATIENT)
Dept: GASTROENTEROLOGY | Facility: AMBULARY SURGERY CENTER | Age: 57
End: 2022-10-26
Payer: COMMERCIAL

## 2022-10-26 VITALS
DIASTOLIC BLOOD PRESSURE: 82 MMHG | HEIGHT: 66 IN | BODY MASS INDEX: 22.95 KG/M2 | SYSTOLIC BLOOD PRESSURE: 128 MMHG | HEART RATE: 86 BPM | WEIGHT: 142.8 LBS | OXYGEN SATURATION: 99 %

## 2022-10-26 DIAGNOSIS — R13.19 OTHER DYSPHAGIA: ICD-10-CM

## 2022-10-26 PROCEDURE — 99244 OFF/OP CNSLTJ NEW/EST MOD 40: CPT | Performed by: INTERNAL MEDICINE

## 2022-10-26 RX ORDER — CETIRIZINE HYDROCHLORIDE 10 MG/1
10 TABLET, CHEWABLE ORAL DAILY
COMMUNITY

## 2022-10-26 RX ORDER — OMEPRAZOLE 20 MG/1
20 CAPSULE, DELAYED RELEASE ORAL DAILY
COMMUNITY
End: 2022-10-26 | Stop reason: SDUPTHER

## 2022-10-26 RX ORDER — OMEPRAZOLE 20 MG/1
20 CAPSULE, DELAYED RELEASE ORAL DAILY
Qty: 90 CAPSULE | Refills: 3 | Status: SHIPPED | OUTPATIENT
Start: 2022-10-26

## 2022-10-26 NOTE — PATIENT INSTRUCTIONS
Scheduled date of EGD(as of today):12/7/2022  Physician performing EGD:DR PANTOJA  Location of EGD:[facility]  Instructions reviewed with patient by:DANICA GARCIA  Clearances:

## 2022-10-26 NOTE — PROGRESS NOTES
[facility] - Outpatient Consultation  Prince Rader 65 y o  female MRN: [id number]  Encounter: [id number]      PCP: Doe Lr MD  Referring: DO Doc Sellers [id number]  [place],  [address] [facility]      ASSESSMENT AND PLAN:      1  Other dysphagia  Differential includes esophageal web versus Schatzki's ring versus eosinophilic esophagitis versus GERD related dysmotility versus URI symptoms contributing  Empiric PPI therapy  - Ambulatory Referral to Gastroenterology  - EGD; Future, including biopsies to rule out eosinophilic esophagitis at, evaluate for erosive symptoms, consider dilation  - omeprazole (PriLOSEC) 20 mg delayed release capsule; Take 1 capsule (20 mg total) by mouth daily  Dispense: 90 capsule; Refill: 3  - consider ENT Evaluation      ______________________________________________________________________    CC:  Chief Complaint   Patient presents with   • New Patient Visit     Trouble swallowing  Chocking on food  Enlarged thyroid       HPI:      Patient is a 65year old female referred for dysphagia  She has DARON on CPAP, PVCs, migraine headaches, allergic rhinitis, Hashimoto's thyroiditis with multinodular thyroid, osteoarthritis of the knee, vaginal atrophy, urinary incontinence, HLD, chronic cough and post-COVID shortness of breath (completed pulm/cardiac rehab)  She reports several years of postnasal drip, with dry mouth and nasal congestion, resulting in chronic throat clearing and pill dysphagia  She also has choking with solid foods, which has been present for years  She reports the symptoms have been worsening due to increased frequency, but are intermittent  Symptoms occur multiple times a month, she recognizes watermelon as a trigger  She denies any unintentional weight loss  She has been evaluated for thyroid disease, this is not felt to be contributing at this time    She has a barium swallow that has been ordered by Pulmonary, and has been started empirically on 20 mg PPI  She has bowel movements approximately every three days  She denies any straining, abdominal pain, bloating, rectal bleeding  Her last colonoscopy was performed by Dr Kandy Cagle in 2019- normal colonoscopy, internal hemorrhoids noted- with recall in 10 years  REVIEW OF SYSTEMS:    CONSTITUTIONAL: Denies any fever, chills, rigors, and weight loss  HEENT: No earache or tinnitus  Denies hearing loss or visual disturbances  CARDIOVASCULAR: No chest pain or palpitations  RESPIRATORY: Denies any cough, hemoptysis, shortness of breath or dyspnea on exertion  GASTROINTESTINAL: As noted in the History of Present Illness  GENITOURINARY: No problems with urination  Denies any hematuria or dysuria  NEUROLOGIC: No dizziness or vertigo, denies headaches  MUSCULOSKELETAL: Denies any muscle or joint pain  SKIN: Denies skin rashes or itching  ENDOCRINE: Denies excessive thirst  Denies intolerance to heat or cold  PSYCHOSOCIAL: Denies depression or anxiety  Denies any recent memory loss         Historical Information   Past Medical History:   Diagnosis Date   • Allergic rhinitis     Resolved 2017    • Cervical neuritis    • COVID-19        • Disease of thyroid gland    • Hashimoto's thyroiditis    • Migraine 2020    Daily headaches   • Miscarriage    • Primary localized osteoarthritis of right knee 2018   • Sleep apnea    • Varicella     As a child   • Vitamin D deficiency      Past Surgical History:   Procedure Laterality Date   •  SECTION     • COLONOSCOPY     • DILATION AND CURETTAGE OF UTERUS     • KNEE SURGERY       Social History   Social History     Substance and Sexual Activity   Alcohol Use No     Social History     Substance and Sexual Activity   Drug Use No     Social History     Tobacco Use   Smoking Status Never Smoker   Smokeless Tobacco Never Used     Family History   Problem Relation Age of Onset   • Hypertension Mother    • Hyperlipidemia Mother    • Osteoarthritis Mother    • Thyroid disease Mother    • Hyperthyroidism Mother    • Asthma Mother    • Heart attack Father    • Heart failure Father          from heart attack  at age 68   • No Known Problems Sister    • No Known Problems Brother    • No Known Problems Daughter    • Heart block Son    • Arthritis Family    • Osteoporosis Family        Meds/Allergies       Current Outpatient Medications:   •  Calcium Carbonate (CALTRATE 600 PO)  •  cetirizine (ZyrTEC) 10 MG chewable tablet  •  levothyroxine 75 mcg tablet  •  omeprazole (PriLOSEC) 20 mg delayed release capsule  •  lidocaine (Lidoderm) 5 %  •  pyridoxine (VITAMIN B6) 100 mg tablet    No Known Allergies        Objective     Blood pressure 128/82, pulse 86, height 5' 6" (1 676 m), weight 64 8 kg (142 lb 12 8 oz), SpO2 99 %  Body mass index is 23 05 kg/m²  PHYSICAL EXAM:      General Appearance:   Alert, cooperative, no distress   HEENT:   Normocephalic, atraumatic, anicteric  Neck:  Supple, symmetrical, trachea midline   Lungs:   Clear to auscultation bilaterally; no rales, rhonchi or wheezing; respirations unlabored    Heart[de-identified]   Regular rate and rhythm; no murmur, rub, or gallop     Abdomen:   Soft, non-tender, non-distended; normal bowel sounds; no masses, no organomegaly    Genitalia:   Deferred    Rectal:   Deferred    Extremities:  No cyanosis, clubbing or edema    Pulses:  2+ and symmetric    Skin:  No jaundice, rashes, or lesions    Lymph nodes:  No palpable cervical lymphadenopathy        Lab Results:     Lab Results   Component Value Date    WBC 8 13 2022    HGB 15 2 2022    HCT 45 9 2022    MCV 95 2022     2022       Lab Results   Component Value Date     2017    K 4 0 2022     2022    CO2 28 2022    ANIONGAP 7 2015    BUN 15 2022    CREATININE 0 81 2022    GLUCOSE 103 2015    CALCIUM 9 9 2022    AST 20 05/16/2022    ALT 19 05/16/2022    ALKPHOS 70 05/16/2022    PROT 7 2 09/12/2017    BILITOT 0 6 09/12/2017    EGFR 81 05/16/2022       Lab Results   Component Value Date    INR 0 92 05/16/2022    PROTIME 12 3 05/16/2022         Radiology Results:   POCT spirometry    Result Date: 10/21/2022  Impression: 10/21/2022 - Spirometry - Ratio 86%, FVC 3 41L (93%), FEV1 2 92L (102%) - normal spirometry      Portions of the record may have been created with voice recognition software  Occasional wrong word or "sound a like" substitutions may have occurred due to the inherent limitations of voice recognition software  Read the chart carefully and recognize, using context, where substitutions have occurred

## 2022-11-15 ENCOUNTER — HOSPITAL ENCOUNTER (OUTPATIENT)
Dept: RADIOLOGY | Facility: HOSPITAL | Age: 57
Discharge: HOME | End: 2022-11-15
Attending: INTERNAL MEDICINE

## 2022-11-15 DIAGNOSIS — R13.19 OTHER DYSPHAGIA: ICD-10-CM

## 2022-11-16 ENCOUNTER — OFFICE VISIT (OUTPATIENT)
Dept: URGENT CARE | Facility: CLINIC | Age: 57
End: 2022-11-16

## 2022-11-16 VITALS
TEMPERATURE: 98.9 F | BODY MASS INDEX: 22.82 KG/M2 | HEIGHT: 66 IN | HEART RATE: 94 BPM | OXYGEN SATURATION: 97 % | WEIGHT: 142 LBS | RESPIRATION RATE: 16 BRPM

## 2022-11-16 DIAGNOSIS — J11.1 INFLUENZA-LIKE ILLNESS: Primary | ICD-10-CM

## 2022-11-16 DIAGNOSIS — Z03.818 ENCNTR FOR OBS FOR SUSP EXPSR TO OTH BIOLG AGENTS RULED OUT: ICD-10-CM

## 2022-11-16 DIAGNOSIS — J02.9 SORE THROAT: ICD-10-CM

## 2022-11-16 LAB — S PYO AG THROAT QL: NEGATIVE

## 2022-11-16 NOTE — PROGRESS NOTES
[facility]        NAME: Caitie Jennings is a 65 y o  female  : 1965    MRN: [id number]  DATE: 2022  TIME: 1:07 PM    Assessment and Plan   Influenza-like illness [J11 1]  1  Influenza-like illness        2  Encntr for obs for susp expsr to oth biolg agents ruled out  Covid/Flu-Office Collect      3  Sore throat  POCT rapid strepA            Patient Instructions       Follow up with PCP in 3-5 days  Proceed to  ER if symptoms worsen  Patient Instructions   Rapid strep negative in office today  CoVid/influenza swab collected today  Please review results on [facility] My Chart and contact PCP for any positive result  Hydrate adequately  May take over the counter Sudafed or other sinus decongestant for nasal stuffiness  Instill saline nasal spray as needed to reduce congestion symptoms  Monitor for any shortness of breath, difficulty breathing and follow up in ER as needed  Follow up with PCP for any persistent or worsening symptoms  Chief Complaint     Chief Complaint   Patient presents with   • Cold Like Symptoms     Pt reports having post nasal drip and cough/body aches/ear pain that started on Sun and fever that started yesterday  History of Present Illness       Here today after 3 days nasal congestion, cough and fever yesterday Tmax 101  +generalized body aches, ear pain, sore throat  Works in healthcare as pediatric nurse  Taking zyrtec, nasonex daily  Last dose motrin 10 am this morning  Denies n/v/d      Review of Systems   Review of Systems   Constitutional: Positive for fever  HENT: Positive for congestion, ear pain, sinus pressure and sore throat  Negative for sneezing  Eyes: Negative for discharge and redness  Respiratory: Positive for cough  Negative for chest tightness and shortness of breath  Cardiovascular: Negative for chest pain  Gastrointestinal: Negative for abdominal pain, diarrhea, nausea and vomiting     Genitourinary: Negative for decreased urine volume  Musculoskeletal: Negative for myalgias  Allergic/Immunologic: Negative for environmental allergies  Neurological: Positive for headaches  Negative for dizziness and syncope  Hematological: Negative for adenopathy  Psychiatric/Behavioral: Negative for sleep disturbance           Current Medications       Current Outpatient Medications:   •  Calcium Carbonate (CALTRATE 600 PO), Take by mouth, Disp: , Rfl:   •  cetirizine (ZyrTEC) 10 MG chewable tablet, Chew 10 mg daily, Disp: , Rfl:   •  levothyroxine 75 mcg tablet, Take 75 mcg by mouth daily, Disp: , Rfl:   •  omeprazole (PriLOSEC) 20 mg delayed release capsule, Take 1 capsule (20 mg total) by mouth daily, Disp: 90 capsule, Rfl: 3    Current Allergies     Allergies as of 2022   • (No Known Allergies)            The following portions of the patient's history were reviewed and updated as appropriate: allergies, current medications, past family history, past medical history, past social history, past surgical history and problem list      Past Medical History:   Diagnosis Date   • Allergic rhinitis     Resolved 2017    • Cervical neuritis    • COVID-19        • Disease of thyroid gland    • Hashimoto's thyroiditis    • Migraine 2020    Daily headaches   • Miscarriage    • Primary localized osteoarthritis of right knee 2018   • Sleep apnea    • Varicella     As a child   • Vitamin D deficiency        Past Surgical History:   Procedure Laterality Date   •  SECTION     • COLONOSCOPY     • DILATION AND CURETTAGE OF UTERUS     • KNEE SURGERY         Family History   Problem Relation Age of Onset   • Hypertension Mother    • Hyperlipidemia Mother    • Osteoarthritis Mother    • Thyroid disease Mother    • Hyperthyroidism Mother    • Asthma Mother    • Heart attack Father    • Heart failure Father          from heart attack  at age 68   • No Known Problems Sister    • No Known Problems Brother    • No Known Problems Daughter    • Heart block Son    • Arthritis Family    • Osteoporosis Family          Medications have been verified  Objective   Pulse 94   Temp 98 9 °F (37 2 °C)   Resp 16   Ht 5' 6" (1 676 m)   Wt 64 4 kg (142 lb)   LMP  (LMP Unknown)   SpO2 97%   BMI 22 92 kg/m²   No LMP recorded (lmp unknown)  Patient is postmenopausal        Physical Exam     Physical Exam  Vitals reviewed  Constitutional:       General: She is not in acute distress  Appearance: She is well-developed, well-groomed and well-nourished  She is not ill-appearing  HENT:      Head: Normocephalic  Right Ear: Tympanic membrane and ear canal normal       Left Ear: Tympanic membrane and ear canal normal       Nose: Congestion present  Mouth/Throat:      Lips: Pink  Mouth: Mucous membranes are normal  Mucous membranes are moist       Pharynx: Posterior oropharyngeal erythema present  Eyes:      General: Lids are normal          Right eye: No discharge  Left eye: No discharge  Cardiovascular:      Rate and Rhythm: Regular rhythm  Heart sounds: Normal heart sounds, S1 normal and S2 normal    Pulmonary:      Effort: Pulmonary effort is normal  No respiratory distress  Breath sounds: Normal breath sounds and air entry  No decreased breath sounds, wheezing or rhonchi  Musculoskeletal:      Cervical back: Normal range of motion  Lymphadenopathy:      Cervical: No cervical adenopathy  Skin:     General: Skin is warm and dry  Neurological:      Mental Status: She is alert  Psychiatric:         Mood and Affect: Mood and affect normal          Behavior: Behavior normal  Behavior is cooperative

## 2022-11-16 NOTE — PATIENT INSTRUCTIONS
Rapid strep negative in office today  CoVid/influenza swab collected today  Please review results on [facility] My Chart and contact PCP for any positive result  Hydrate adequately  May take over the counter Sudafed or other sinus decongestant for nasal stuffiness  Instill saline nasal spray as needed to reduce congestion symptoms  Monitor for any shortness of breath, difficulty breathing and follow up in ER as needed  Follow up with PCP for any persistent or worsening symptoms

## 2022-11-17 LAB
FLUAV RNA RESP QL NAA+PROBE: NEGATIVE
FLUBV RNA RESP QL NAA+PROBE: NEGATIVE
SARS-COV-2 RNA RESP QL NAA+PROBE: NEGATIVE

## 2022-12-06 ENCOUNTER — OFFICE VISIT (OUTPATIENT)
Dept: FAMILY MEDICINE CLINIC | Facility: CLINIC | Age: 57
End: 2022-12-06

## 2022-12-06 VITALS
DIASTOLIC BLOOD PRESSURE: 80 MMHG | HEART RATE: 81 BPM | OXYGEN SATURATION: 99 % | WEIGHT: 141 LBS | SYSTOLIC BLOOD PRESSURE: 110 MMHG | HEIGHT: 66 IN | RESPIRATION RATE: 16 BRPM | BODY MASS INDEX: 22.66 KG/M2

## 2022-12-06 DIAGNOSIS — Z13.6 SCREENING FOR CARDIOVASCULAR CONDITION: ICD-10-CM

## 2022-12-06 DIAGNOSIS — Z00.00 ANNUAL PHYSICAL EXAM: ICD-10-CM

## 2022-12-06 DIAGNOSIS — E04.2 MULTINODULAR THYROID: ICD-10-CM

## 2022-12-06 DIAGNOSIS — Z12.31 ENCOUNTER FOR SCREENING MAMMOGRAM FOR BREAST CANCER: Primary | ICD-10-CM

## 2022-12-06 PROBLEM — R06.02 SOB (SHORTNESS OF BREATH) ON EXERTION: Status: RESOLVED | Noted: 2022-04-12 | Resolved: 2022-12-06

## 2022-12-06 PROBLEM — R03.0 ELEVATED BLOOD PRESSURE READING: Status: RESOLVED | Noted: 2020-12-31 | Resolved: 2022-12-06

## 2022-12-06 PROBLEM — R20.0 FACIAL NUMBNESS: Status: RESOLVED | Noted: 2019-11-18 | Resolved: 2022-12-06

## 2022-12-06 RX ORDER — SODIUM CHLORIDE, SODIUM LACTATE, POTASSIUM CHLORIDE, CALCIUM CHLORIDE 600; 310; 30; 20 MG/100ML; MG/100ML; MG/100ML; MG/100ML
75 INJECTION, SOLUTION INTRAVENOUS CONTINUOUS
Status: CANCELLED | OUTPATIENT
Start: 2022-12-06

## 2022-12-06 NOTE — PATIENT INSTRUCTIONS

## 2022-12-06 NOTE — PROGRESS NOTES
ADULT ANNUAL [address]  [facility]    NAME: Katty Rosen  AGE: 65 y o  SEX: female  : 1965     DATE: 2022     Assessment and Plan:     Problem List Items Addressed This Visit        Endocrine    Multinodular thyroid     Follows endocrinologist            Other    Screening for cardiovascular condition - Primary    Relevant Orders    CBC and differential    Comprehensive metabolic panel    Lipid panel    Vitamin D 25 hydroxy   Other Visit Diagnoses     Annual physical exam          She follows gynecologist, she is up-to-date on Pap smear and she will be due for mammogram in March  She is due for tetanus vaccine but now she has recent URI, will get in her next visit    Immunizations and preventive care screenings were discussed with patient today  Appropriate education was printed on patient's after visit summary  Counseling:  Dental Health: discussed importance of regular tooth brushing, flossing, and dental visits  Exercise: the importance of regular exercise/physical activity was discussed  Recommend exercise 3-5 times per week for at least 30 minutes  No follow-ups on file  Chief Complaint:     Chief Complaint   Patient presents with   • Annual Exam      History of Present Illness:     Adult Annual Physical   Patient here for a comprehensive physical exam  The patient reports problems - She had recent URI, and she was tested for COVID, influenza and strep test and they all negative, she has some lingering cough, she had barium swallow test done and now she is going for endoscopy,  She also follows endocrinologist and thyroid has been normal TSH is normal range     Her cholesterol was high in the past,   Diet and Physical Activity  Diet/Nutrition: well balanced diet  Exercise: walking        Depression Screening  PHQ-2/9 Depression Screening    Little interest or pleasure in doing things: 0 - not at all  Feeling down, depressed, or hopeless: 0 - not at all  PHQ-2 Score: 0  PHQ-2 Interpretation: Negative depression screen       General Health  Sleep: sleeps well  Hearing: normal - bilateral   Vision: no vision problems  Dental: regular dental visits  /GYN Health  Patient is: postmenopausal  Last menstrual period: 6 years ago       Review of Systems:     Review of Systems   Constitutional: Negative for activity change, appetite change, chills, fatigue, fever and unexpected weight change  HENT: Negative for congestion, ear discharge, ear pain, nosebleeds, postnasal drip, rhinorrhea, sinus pressure, sneezing, sore throat, trouble swallowing and voice change  Eyes: Negative for photophobia, pain, discharge, redness and itching  Respiratory: Positive for cough  Negative for chest tightness, shortness of breath and wheezing  Cardiovascular: Negative for chest pain, palpitations and leg swelling  Gastrointestinal: Negative for abdominal pain, constipation, diarrhea, nausea and vomiting  Endocrine: Negative for polyuria  Genitourinary: Negative for dysuria, frequency and urgency  Musculoskeletal: Negative for arthralgias, back pain, myalgias and neck pain  Skin: Negative for color change, pallor and rash  Allergic/Immunologic: Negative for environmental allergies and food allergies  Neurological: Negative for dizziness, weakness, light-headedness and headaches  Hematological: Negative for adenopathy  Does not bruise/bleed easily  Psychiatric/Behavioral: Negative for behavioral problems  The patient is not nervous/anxious         Past Medical History:     Past Medical History:   Diagnosis Date   • Allergic rhinitis     Resolved 8/23/2017    • Cervical neuritis    • Contact lens/glasses fitting    • COVID-19     12/20   • Disease of thyroid gland    • Dysphagia     mild stasis, dysmotility   • Hashimoto's thyroiditis    • Migraine 08/24/2020    Daily headaches   • Miscarriage 1996   • Primary localized osteoarthritis of right knee 2018   • Sleep apnea     mild   • Varicella     As a child   • Vitamin D deficiency       Past Surgical History:     Past Surgical History:   Procedure Laterality Date   •  SECTION     • COLONOSCOPY     • DILATION AND CURETTAGE OF UTERUS     • KNEE SURGERY Left     meniscus      Social History:     Social History     Socioeconomic History   • Marital status: /Civil Union     Spouse name: None   • Number of children: 2   • Years of education: None   • Highest education level:  Bachelor's degree (e g , BA, AB, BS)   Occupational History   • Occupation: RN, weekends    Tobacco Use   • Smoking status: Never   • Smokeless tobacco: Never   Vaping Use   • Vaping Use: Never used   Substance and Sexual Activity   • Alcohol use: No   • Drug use: No   • Sexual activity: Not Currently     Partners: Male     Birth control/protection: Post-menopausal     Comment: not sexually active and acceptable to both   Other Topics Concern   • None   Social History Narrative    Daily cola consumption (__cans/day)    Daily tea consumption (1 cup/day)    Does not exercise     x25 years     Social Determinants of Health     Financial Resource Strain: Not on file   Food Insecurity: Not on file   Transportation Needs: Not on file   Physical Activity: Not on file   Stress: Not on file   Social Connections: Not on file   Intimate Partner Violence: Not on file   Housing Stability: Not on file      Family History:     Family History   Problem Relation Age of Onset   • Hypertension Mother    • Hyperlipidemia Mother    • Osteoarthritis Mother    • Thyroid disease Mother    • Hyperthyroidism Mother    • Asthma Mother    • Heart attack Father    • Heart failure Father          from heart attack  at age 68   • No Known Problems Sister    • No Known Problems Brother    • No Known Problems Daughter    • Heart block Son    • Arthritis Family    • Osteoporosis Family       Current Medications:     Current Outpatient Medications   Medication Sig Dispense Refill   • Calcium Carbonate (CALTRATE 600 PO) Take by mouth in the morning     • cetirizine (ZyrTEC) 10 MG chewable tablet Chew 10 mg daily     • levothyroxine 75 mcg tablet Take 75 mcg by mouth daily     • Mometasone Furoate (NASONEX NA) into each nostril daily at bedtime     • omeprazole (PriLOSEC) 20 mg delayed release capsule Take 1 capsule (20 mg total) by mouth daily 90 capsule 3     No current facility-administered medications for this visit  Allergies:     No Known Allergies   Physical Exam:     /80   Pulse 81   Resp 16   Ht 5' 6" (1 676 m)   Wt 64 kg (141 lb)   LMP  (LMP Unknown)   SpO2 99%   BMI 22 76 kg/m²     Physical Exam  Vitals and nursing note reviewed  Constitutional:       General: She is not in acute distress  Appearance: Normal appearance  She is not ill-appearing  HENT:      Head: Normocephalic and atraumatic  Right Ear: Tympanic membrane and ear canal normal  There is no impacted cerumen  Left Ear: Tympanic membrane and ear canal normal  There is no impacted cerumen  Nose: Nose normal  No congestion or rhinorrhea  Mouth/Throat:      Mouth: Mucous membranes are moist       Pharynx: Oropharynx is clear  No oropharyngeal exudate or posterior oropharyngeal erythema  Eyes:      General: No scleral icterus  Right eye: No discharge  Left eye: No discharge  Extraocular Movements: Extraocular movements intact  Conjunctiva/sclera: Conjunctivae normal       Pupils: Pupils are equal, round, and reactive to light  Cardiovascular:      Rate and Rhythm: Normal rate and regular rhythm  Heart sounds: Normal heart sounds  No murmur heard  Pulmonary:      Effort: Pulmonary effort is normal       Breath sounds: Normal breath sounds  No wheezing or rales  Abdominal:      General: Abdomen is flat  Bowel sounds are normal  There is no distension  Palpations: Abdomen is soft  There is no mass  Tenderness: There is no abdominal tenderness  Musculoskeletal:         General: No swelling, tenderness or deformity  Normal range of motion  Cervical back: Normal range of motion and neck supple  No muscular tenderness  Right lower leg: No edema  Left lower leg: No edema  Lymphadenopathy:      Cervical: No cervical adenopathy  Skin:     Coloration: Skin is not jaundiced or pale  Findings: No erythema, lesion or rash  Neurological:      General: No focal deficit present  Mental Status: She is alert and oriented to person, place, and time        Gait: Gait normal    Psychiatric:         Mood and Affect: Mood normal          Behavior: Behavior normal           Diya Haro MD  [place] [id number]

## 2022-12-07 ENCOUNTER — ANESTHESIA EVENT (OUTPATIENT)
Dept: GASTROENTEROLOGY | Facility: AMBULARY SURGERY CENTER | Age: 57
End: 2022-12-07

## 2022-12-07 ENCOUNTER — HOSPITAL ENCOUNTER (OUTPATIENT)
Dept: GASTROENTEROLOGY | Facility: AMBULARY SURGERY CENTER | Age: 57
Setting detail: OUTPATIENT SURGERY
Discharge: HOME | End: 2022-12-07
Attending: INTERNAL MEDICINE

## 2022-12-07 ENCOUNTER — ANESTHESIA (OUTPATIENT)
Dept: GASTROENTEROLOGY | Facility: AMBULARY SURGERY CENTER | Age: 57
End: 2022-12-07

## 2022-12-07 VITALS
DIASTOLIC BLOOD PRESSURE: 66 MMHG | OXYGEN SATURATION: 95 % | WEIGHT: 141 LBS | HEIGHT: 66 IN | TEMPERATURE: 97.7 F | BODY MASS INDEX: 22.66 KG/M2 | HEART RATE: 71 BPM | RESPIRATION RATE: 25 BRPM | SYSTOLIC BLOOD PRESSURE: 105 MMHG

## 2022-12-07 DIAGNOSIS — R13.19 OTHER DYSPHAGIA: ICD-10-CM

## 2022-12-07 DIAGNOSIS — R05.3 CHRONIC COUGH: Primary | ICD-10-CM

## 2022-12-07 PROBLEM — E03.9 HYPOTHYROIDISM: Status: ACTIVE | Noted: 2022-12-07

## 2022-12-07 RX ORDER — LIDOCAINE HYDROCHLORIDE 20 MG/ML
INJECTION, SOLUTION EPIDURAL; INFILTRATION; INTRACAUDAL; PERINEURAL AS NEEDED
Status: DISCONTINUED | OUTPATIENT
Start: 2022-12-07 | End: 2022-12-07

## 2022-12-07 RX ORDER — SODIUM CHLORIDE, SODIUM LACTATE, POTASSIUM CHLORIDE, CALCIUM CHLORIDE 600; 310; 30; 20 MG/100ML; MG/100ML; MG/100ML; MG/100ML
75 INJECTION, SOLUTION INTRAVENOUS CONTINUOUS
Status: DISCONTINUED | OUTPATIENT
Start: 2022-12-07 | End: 2022-12-11 | Stop reason: HOSPADM

## 2022-12-07 RX ORDER — PROPOFOL 10 MG/ML
INJECTION, EMULSION INTRAVENOUS AS NEEDED
Status: DISCONTINUED | OUTPATIENT
Start: 2022-12-07 | End: 2022-12-07

## 2022-12-07 RX ADMIN — PROPOFOL 90 MG: 10 INJECTION, EMULSION INTRAVENOUS at 08:57

## 2022-12-07 RX ADMIN — LIDOCAINE HYDROCHLORIDE 100 MG: 20 INJECTION, SOLUTION EPIDURAL; INFILTRATION; INTRACAUDAL; PERINEURAL at 08:55

## 2022-12-07 RX ADMIN — PROPOFOL 60 MG: 10 INJECTION, EMULSION INTRAVENOUS at 08:55

## 2022-12-07 RX ADMIN — PROPOFOL 50 MG: 10 INJECTION, EMULSION INTRAVENOUS at 08:59

## 2022-12-07 RX ADMIN — PROPOFOL 50 MG: 10 INJECTION, EMULSION INTRAVENOUS at 09:01

## 2022-12-07 RX ADMIN — SODIUM CHLORIDE, SODIUM LACTATE, POTASSIUM CHLORIDE, AND CALCIUM CHLORIDE 75 ML/HR: .6; .31; .03; .02 INJECTION, SOLUTION INTRAVENOUS at 07:30

## 2022-12-07 NOTE — ANESTHESIA POSTPROCEDURE EVALUATION
Post-Op Assessment Note    CV Status:  Stable    Pain management: adequate     Mental Status:  Alert and awake   Hydration Status:  Euvolemic   PONV Controlled:  Controlled   Airway Patency:  Patent      Post Op Vitals Reviewed: Yes            No notable events documented      BP   132/70   Temp      Pulse  70   Resp   20   SpO2   100

## 2022-12-07 NOTE — H&P
History and Physical - [facility]  Angelita Mckeon 65 y o  female MRN: [id number]                  HPI: Angelita Mckeon is a 65y o  year old female who presents for dysphagia  REVIEW OF SYSTEMS: Per the HPI, and otherwise unremarkable      Historical Information   Past Medical History:   Diagnosis Date   • Allergic rhinitis     Resolved 2017    • Cervical neuritis    • Contact lens/glasses fitting    • COVID-19        • Disease of thyroid gland    • Dysphagia     mild stasis, dysmotility   • Hashimoto's thyroiditis    • Migraine 2020    Daily headaches   • Miscarriage    • Primary localized osteoarthritis of right knee 2018   • Sleep apnea     mild   • Varicella     As a child   • Vitamin D deficiency      Past Surgical History:   Procedure Laterality Date   •  SECTION     • COLONOSCOPY     • DILATION AND CURETTAGE OF UTERUS     • KNEE SURGERY Left     meniscus     Social History   Social History     Substance and Sexual Activity   Alcohol Use No     Social History     Substance and Sexual Activity   Drug Use No     Social History     Tobacco Use   Smoking Status Never   Smokeless Tobacco Never     Family History   Problem Relation Age of Onset   • Hypertension Mother    • Hyperlipidemia Mother    • Osteoarthritis Mother    • Thyroid disease Mother    • Hyperthyroidism Mother    • Asthma Mother    • Heart attack Father    • Heart failure Father          from heart attack  at age 68   • No Known Problems Sister    • No Known Problems Brother    • No Known Problems Daughter    • Heart block Son    • Arthritis Family    • Osteoporosis Family        Meds/Allergies       Current Outpatient Medications:   •  Calcium Carbonate (CALTRATE 600 PO)  •  cetirizine (ZyrTEC) 10 MG chewable tablet  •  levothyroxine 75 mcg tablet  •  Mometasone Furoate (NASONEX NA)  •  omeprazole (PriLOSEC) 20 mg delayed release capsule    Current Facility-Administered Medications:   •  lactated ringers infusion, 75 mL/hr, Intravenous, Continuous, 75 mL/hr at 12/07/22 0730    No Known Allergies    Objective     /66   Pulse 80   Temp 97 7 °F (36 5 °C) (Temporal)   Resp 18   Ht 5' 6" (1 676 m)   Wt 64 kg (141 lb)   LMP  (LMP Unknown)   SpO2 98%   BMI 22 76 kg/m²       PHYSICAL EXAM    Gen: NAD  Head: NCAT  CV: RRR  CHEST: Clear  ABD: soft, NT/ND  EXT: no edema      ASSESSMENT/PLAN:  This is a 65y o  year old female here for EGD, and she is stable and optimized for her procedure

## 2022-12-07 NOTE — ANESTHESIA PREPROCEDURE EVALUATION
Procedure:  EGD    Relevant Problems   CARDIO   (+) Mixed hyperlipidemia (no meds)   (+) Premature atrial complex   (+) Premature ventricular contraction      ENDO   (+) Hypothyroidism      GI/HEPATIC   (+) Other dysphagia      MUSCULOSKELETAL   (+) Primary localized osteoarthritis of right knee      NEURO/PSYCH   (+) Anxiety      PULMONARY   (+) DARON (obstructive sleep apnea)        Physical Exam    Airway    Mallampati score: II  TM Distance: >3 FB  Neck ROM: full     Dental       Cardiovascular  Rhythm: regular, Rate: normal,     Pulmonary  Breath sounds clear to auscultation,     Other Findings        Anesthesia Plan  ASA Score- 2     Anesthesia Type- IV sedation with anesthesia with ASA Monitors  Additional Monitors:   Airway Plan:           Plan Factors-    Chart reviewed  Patient is not a current smoker  Induction- intravenous  Postoperative Plan-     Informed Consent- Anesthetic plan and risks discussed with patient  I personally reviewed this patient with the CRNA  Discussed and agreed on the Anesthesia Plan with the RITIKA Alicea

## 2023-01-09 NOTE — PROGRESS NOTES
Pulmonary Follow Up Note   Antonio Paulino 65 y o  female MRN: [id number]  1/10/2023      Assessment:  1  Shortness of breath  • Post COVID - normal spirometry, normal CT imaging, improved with pulmonary rehab - suspect deconditioning component now resolved  • Dyspnea has resolved, cough persists - eval/treatment as below     2  Chronic Cough, globus sensation  • Spirometry normal, noted upper airway inflammation and now more globus sensation  • Add trial of nasal atrovent  • Refer to ENT     3  Allergic Rhinitis   • Continue OTC antihistamine with allegra or Zyrtec  • Consider resuming nasal saline irrigation  • Given trial of Nasal atrovent  • Referral to ENT     4  Dysphagia  • EGD results as listed  • Add prn Pepcid and to discuss weaning off PPI with GI     5  DARON - Await CPAP titration      Follow up in 3 months or sooner as needed  Plan:    Diagnoses and all orders for this visit:    Chronic cough  -     ipratropium (ATROVENT) 0 03 % nasal spray; 2 sprays into each nostril every 12 (twelve) hours  -     Ambulatory Referral to Otolaryngology; Future    DARON (obstructive sleep apnea)    Other dysphagia    Other orders  -     fluticasone (FLONASE) 50 mcg/act nasal spray; 1 spray into each nostril daily        Return in about 3 months (around 4/10/2023) for Recheck  History of Present Illness   HPI:  Antonio Paulino is a 65 y o  female who has DARON, hypothyroidism/Hashimoto's thyroiditis, migraine HA, and COVID-19 infection in late 2020  She was seen by Dr Senia Lakhani in April 2022 for evaluation of dyspnea, dizziness and fatigue since her COVID-19 infection  She completed pulmonary rehab in August 2022 and had completion 6MWT distance of 430 meters, no desaturations  She had repeat COVID-19 infection in August 2022 (8/3/2022 +) and was given Paxlovid  She was last seen by me in Oct 2022 with plans for rescheduled CPAP titration, check upper GI and refer to GI given dysphagia   She had EGD with some erythematous mucosa in the antrum  She presents today for follow up  She had repeat viral infection, non-COVID in 2022  She had noted increased cough and post nasal drainage  She reports she has stopped nasal saline irrigation  She had increased nasal dryness with flonase  Noted persistent post nasal drainage symptoms, globus sensation and frequent throat clearing  Has undergone EGD as listed  Feels more reflux symptoms now than prior to starting PPI      Additional social history:  Non-smoker  Pediatric Nurse  Pet Cat    Review of Systems   Constitutional: Negative for activity change, appetite change, fatigue and fever  HENT: Positive for postnasal drip, sore throat, trouble swallowing and voice change  Negative for ear pain, mouth sores, rhinorrhea and sneezing  Respiratory: Positive for cough  Negative for chest tightness, shortness of breath and wheezing  Cardiovascular: Negative for chest pain and leg swelling  Gastrointestinal: Negative for abdominal pain, nausea and vomiting  Endocrine: Negative for cold intolerance  Musculoskeletal: Negative for gait problem and myalgias  Allergic/Immunologic: Negative for immunocompromised state  Neurological: Negative for headaches  Hematological: Negative for adenopathy  Psychiatric/Behavioral: Negative for sleep disturbance  The patient is not nervous/anxious          Historical Information   Past Medical History:   Diagnosis Date   • Allergic rhinitis     Resolved 2017    • Cervical neuritis    • Contact lens/glasses fitting    • COVID-19        • Disease of thyroid gland    • Dysphagia     mild stasis, dysmotility   • Hashimoto's thyroiditis    • Migraine 2020    Daily headaches   • Miscarriage    • Primary localized osteoarthritis of right knee 2018   • Sleep apnea     mild   • Varicella     As a child   • Vitamin D deficiency      Past Surgical History:   Procedure Laterality Date   •  SECTION     • COLONOSCOPY • DILATION AND CURETTAGE OF UTERUS     • KNEE SURGERY Left     meniscus     Family History   Problem Relation Age of Onset   • Hypertension Mother    • Hyperlipidemia Mother    • Osteoarthritis Mother    • Thyroid disease Mother    • Hyperthyroidism Mother    • Asthma Mother    • Coronary artery disease Mother    • Heart failure Mother          from heart failure after back surgery 10/21   • Heart attack Father    • Heart failure Father          from heart attack  at age 68   • No Known Problems Sister    • No Known Problems Brother    • No Known Problems Daughter    • Heart block Son    • Arthritis Family    • Osteoporosis Family          Meds/Allergies     Current Outpatient Medications:   •  Calcium Carbonate (CALTRATE 600 PO), Take by mouth in the morning, Disp: , Rfl:   •  cetirizine (ZyrTEC) 10 MG chewable tablet, Chew 10 mg daily, Disp: , Rfl:   •  fluticasone (FLONASE) 50 mcg/act nasal spray, 1 spray into each nostril daily, Disp: , Rfl:   •  ipratropium (ATROVENT) 0 03 % nasal spray, 2 sprays into each nostril every 12 (twelve) hours, Disp: 30 mL, Rfl: 1  •  levothyroxine 75 mcg tablet, Take 75 mcg by mouth daily, Disp: , Rfl:   •  omeprazole (PriLOSEC) 20 mg delayed release capsule, Take 1 capsule (20 mg total) by mouth daily, Disp: 90 capsule, Rfl: 3  •  Mometasone Furoate (NASONEX NA), into each nostril daily at bedtime, Disp: , Rfl:   No Known Allergies    Vitals: Blood pressure 128/78, pulse 83, temperature 98 4 °F (36 9 °C), temperature source Tympanic, resp  rate 16, height 5' 6" (1 676 m), weight 62 6 kg (138 lb), SpO2 99 %  Body mass index is 22 27 kg/m²  Oxygen Therapy  SpO2: 99 %  Oxygen Therapy: None (Room air)      Physical Exam  Physical Exam  Vitals reviewed  Constitutional:       General: She is not in acute distress  Appearance: Normal appearance  She is well-developed and normal weight  She is not ill-appearing, toxic-appearing or diaphoretic     HENT:      Head: Normocephalic and atraumatic  Right Ear: External ear normal       Left Ear: External ear normal       Nose: Congestion and rhinorrhea present  Comments: Clear rhinorrhea, nasal turbinate bogginess and erythema     Mouth/Throat:      Mouth: Mucous membranes are moist       Pharynx: No oropharyngeal exudate  Comments: Posterior pharyngeal cobblestoning  Eyes:      General: No scleral icterus  Right eye: No discharge  Left eye: No discharge  Conjunctiva/sclera: Conjunctivae normal       Pupils: Pupils are equal, round, and reactive to light  Neck:      Vascular: No JVD  Trachea: No tracheal deviation  Cardiovascular:      Rate and Rhythm: Normal rate and regular rhythm  Heart sounds: Normal heart sounds  No murmur heard  No gallop  Pulmonary:      Effort: Pulmonary effort is normal  No respiratory distress  Breath sounds: Normal breath sounds  No stridor  No wheezing, rhonchi or rales  Abdominal:      General: Bowel sounds are normal  There is no distension  Palpations: Abdomen is soft  Tenderness: There is no abdominal tenderness  There is no guarding or rebound  Musculoskeletal:         General: No deformity  Right lower leg: No edema  Left lower leg: No edema  Lymphadenopathy:      Cervical: No cervical adenopathy  Skin:     General: Skin is warm and dry  Coloration: Skin is not jaundiced  Findings: No erythema or rash  Neurological:      General: No focal deficit present  Mental Status: She is alert and oriented to person, place, and time  Mental status is at baseline  Psychiatric:         Mood and Affect: Mood normal          Behavior: Behavior normal          Thought Content: Thought content normal          Labs: I have personally reviewed pertinent lab results    Lab Results   Component Value Date    WBC 8 13 05/16/2022    HGB 15 2 05/16/2022    HCT 45 9 05/16/2022    MCV 95 05/16/2022     05/16/2022 Lab Results   Component Value Date    GLUCOSE 103 02/11/2015    CALCIUM 9 9 05/16/2022     09/12/2017    K 4 0 05/16/2022    CO2 28 05/16/2022     05/16/2022    BUN 15 05/16/2022    CREATININE 0 81 05/16/2022     No results found for: IGE  Lab Results   Component Value Date    ALT 19 05/16/2022    AST 20 05/16/2022    ALKPHOS 70 05/16/2022    BILITOT 0 6 09/12/2017       Imaging and other studies: new images and reports personally reivewed  CT angio 5/16/2022 - no PE, lungs clear, no infiltrates, no effusions, no interstitial changes, no sig mediastinal adenopathy, no changes from CT angio on 3/24/2022     Pulmonary function testing:   Upper GI 11/15/2022 - mild esophageal dysmotility and gastroesophageal reflux    10/21/2022 - Spirometry - Ratio 86%, FVC 3 41L (93%), FEV1 2 92L (102%) - normal spirometry     4/12/2022 - 6MWT on RA - total walk distance 414 meters with lowest SpO2 97%     Diagnostic PSG - 2/11/2022 - AHI 7 2/hr, supine AHI 29 5, REM 20 3, lowest SpO2 84%     EKG, Pathology, and Other Studies: I have personally reviewed pertinent reports  TTE 6/2021 - EF 55-60%, normal RV size/function    Moo Perez, DO, FACP  [facility]    Answers for HPI/ROS submitted by the patient on 1/9/2023  Do you experience frequent throat clearing?: Yes  Do you have a hoarse voice?: Yes  Chronicity: chronic  How often do your symptoms occur?: daily  Since you first noticed this problem, how has it changed?: waxing and waning  Do you have shortness of breath that occurs with effort or exertion?: No  Do you have ear congestion?: No  Do you have heartburn?: No  Do you have fatigue?: No  Do you have nasal congestion?: Yes  Do you have shortness of breath when lying flat?: No  Do you have shortness of breath when you wake up?: No  Do you have sweats?: No  Have you experienced weight loss?: No  Which of the following makes your symptoms worse?: nothing  Which of the following makes your symptoms better?: nothing

## 2023-01-10 ENCOUNTER — OFFICE VISIT (OUTPATIENT)
Dept: PULMONOLOGY | Facility: CLINIC | Age: 58
End: 2023-01-10

## 2023-01-10 VITALS
HEART RATE: 83 BPM | SYSTOLIC BLOOD PRESSURE: 128 MMHG | TEMPERATURE: 98.4 F | OXYGEN SATURATION: 99 % | DIASTOLIC BLOOD PRESSURE: 78 MMHG | HEIGHT: 66 IN | BODY MASS INDEX: 22.18 KG/M2 | RESPIRATION RATE: 16 BRPM | WEIGHT: 138 LBS

## 2023-01-10 DIAGNOSIS — R05.3 CHRONIC COUGH: Primary | ICD-10-CM

## 2023-01-10 DIAGNOSIS — G47.33 OSA (OBSTRUCTIVE SLEEP APNEA): ICD-10-CM

## 2023-01-10 DIAGNOSIS — R13.19 OTHER DYSPHAGIA: ICD-10-CM

## 2023-01-10 PROBLEM — R09.A2 GLOBUS SENSATION: Status: ACTIVE | Noted: 2023-01-10

## 2023-01-10 PROBLEM — R09.89 GLOBUS SENSATION: Status: ACTIVE | Noted: 2023-01-10

## 2023-01-10 RX ORDER — FLUTICASONE PROPIONATE 50 MCG
1 SPRAY, SUSPENSION (ML) NASAL DAILY
COMMUNITY

## 2023-01-10 RX ORDER — IPRATROPIUM BROMIDE 21 UG/1
2 SPRAY, METERED NASAL EVERY 12 HOURS
Qty: 30 ML | Refills: 1 | Status: SHIPPED | OUTPATIENT
Start: 2023-01-10 | End: 2023-03-11

## 2023-01-10 NOTE — PATIENT INSTRUCTIONS
Stop flonase and give trial of Nasal atrovent 1-2 sprays twice daily  Consider prn pepcid for reflux symptoms, d/w GI weaning off priolosec  As needed nasal saline sinus irrigation  Referral to Dr Dorris Fleischer - ENT

## 2023-01-27 ENCOUNTER — OFFICE VISIT (OUTPATIENT)
Dept: GASTROENTEROLOGY | Facility: AMBULARY SURGERY CENTER | Age: 58
End: 2023-01-27

## 2023-01-27 VITALS
HEIGHT: 66 IN | OXYGEN SATURATION: 98 % | BODY MASS INDEX: 22.82 KG/M2 | SYSTOLIC BLOOD PRESSURE: 118 MMHG | WEIGHT: 142 LBS | HEART RATE: 72 BPM | DIASTOLIC BLOOD PRESSURE: 80 MMHG

## 2023-01-27 DIAGNOSIS — R05.3 CHRONIC COUGH: ICD-10-CM

## 2023-01-27 DIAGNOSIS — Z12.11 COLON CANCER SCREENING: ICD-10-CM

## 2023-01-27 DIAGNOSIS — R13.19 ESOPHAGEAL DYSPHAGIA: ICD-10-CM

## 2023-01-27 DIAGNOSIS — R09.89 CHRONIC THROAT CLEARING: Primary | ICD-10-CM

## 2023-01-27 NOTE — PROGRESS NOTES
Assessment and Plan    #1  Chronic throat clearing with cough and esophageal dysphagia: patient does have signs of reflux on esophageal biopsies and UGI series as well as some mild esophageal dysmotility  Her symptoms could be reflux related versus post nasal drip related, or both  She took omeprazole 20 mg for 3 months with no change in her symptoms so stopped this  Possible that this was not a strong enough dose  She has seen pulmonology recently and was recommended to see ENT which appt has not yet been scheduled   -had a long conversation with patient about possible etiologies including GERD, postnasal drip, or combination of multiple etiologies  Advised that she may not have been on a high enough dose of PPI to see any benefit  -At this time would recommend patient follow-up with ENT  They can evaluate her for any ENT issues that could be causing her chronic throat clearing and symptoms  If they feel that her evaluation is unremarkable and that this is more related to reflux, I advised the patient to contact our office and we could try a higher dose of omeprazole  Could consider putting her on omeprazole 40 mg twice daily for a couple months to see if she has any benefit with follow-up in the office  -I advised that if she continues to have symptoms despite being on omeprazole 40 mg twice daily, would recommend pH/manometry study in the future  Patient is hesitant to undergo this but is willing if necessary  I did discuss the testing in detail with her during her visit today    #2  Colon cancer screening  -Due for repeat colonoscopy in 2029    No history of colon polyps  --------------------------------------------------------------------------------------------------------------------    Chief Complaint: f/u chronic throat clearing, cough, dysphagia    HPI: César Graves is a 65 y o  female with a history of Hashimoto's thyroiditis, hypothyroidism, sleep apnea, COVID infection x2 with most recent infection in August and recent URI in November who presents today for follow up for chronic throat clearing, dysphagia, and cough  Patient has had extensive work-up to assess for signs of acid reflux  She had an upper GI series which showed some mild dysmotility as well as reflux to the midesophagus  Her EGD was performed which was notable for some chronic inflammation in the esophagus on biopsies  She reports taking omeprazole 20 mg daily for the last 3 months without any improvement so she stopped this medication  Reports that her symptoms definitely worsened after having COVID in August and taking pack Slo-Bid as well as having her URI in November  She reports that she will have to drink water to help with pills and food going down  Denies any nausea or vomiting  She does not have any typical symptoms of acid reflux or esophageal burning  She was seen by pulmonology as well  She was recommended to follow-up with ENT however this appointment has not yet been scheduled  She was seen by them once before but this was in 2019  She did not have a lower endoscopy at that time  She reports that her bowels are pretty regular for her which is usually once every couple days  Denies any blood in the stool or black tarry stools  Denies any significant constipation or straining  Denies any diarrhea  Weight has been stable  Denies any significant abdominal discomfort          Review of Systems:   General: negative for fatigue, fever, night sweats or unexpected weight loss  Psychological: negative for anxiety or depression  Ophthalmic: negative for blurry vision or scleral icterus  ENT: negative for headaches, oral lesions, sore throat; dry mouth, throat clearing, hoarse voice, dysphagia  Hematological and Lymphatic: negative for pallor or swollen lymph nodes  Respiratory: negative for cough, shortness of breath or wheezing  Cardiovascular: negative for chest pain, edema or murmur  Gastrointestinal: as mentioned in HPI  Genito-Urinary: negative for dysuria or incontinence  Musculoskeletal: negative for joint pain, joint stiffness or joint swelling  Dermatological: negative for pruritus, rash, or jaundice    Current Medications  Current Outpatient Medications   Medication Sig Dispense Refill   • Calcium-Cholecalciferol-Zinc (Viactiv Calcium Immune) 650-20-5 5 MG-MCG-MG CHEW      • Calcium Carbonate (CALTRATE 600 PO) Take by mouth in the morning (Patient not taking: Reported on 2023)     • cetirizine (ZyrTEC) 10 MG chewable tablet Chew 10 mg daily     • fluticasone (FLONASE) 50 mcg/act nasal spray 1 spray into each nostril daily     • ipratropium (ATROVENT) 0 03 % nasal spray 2 sprays into each nostril every 12 (twelve) hours 30 mL 1   • levothyroxine 75 mcg tablet Take 75 mcg by mouth daily     • Mometasone Furoate (NASONEX NA) into each nostril daily at bedtime (Patient not taking: Reported on 2023)     • omeprazole (PriLOSEC) 20 mg delayed release capsule Take 1 capsule (20 mg total) by mouth daily (Patient not taking: Reported on 2023) 90 capsule 3     No current facility-administered medications for this visit         Past Medical History  Past Medical History:   Diagnosis Date   • Allergic rhinitis     Resolved 2017    • Cervical neuritis    • Contact lens/glasses fitting    • COVID-19        • Disease of thyroid gland    • Dysphagia     mild stasis, dysmotility   • Hashimoto's thyroiditis    • Migraine 2020    Daily headaches   • Miscarriage    • Primary localized osteoarthritis of right knee 2018   • Sleep apnea     mild   • Varicella     As a child   • Vitamin D deficiency        Past Surgical History  Past Surgical History:   Procedure Laterality Date   •  SECTION     • COLONOSCOPY     • DILATION AND CURETTAGE OF UTERUS     • KNEE SURGERY Left     meniscus       Past Social History   Social History     Socioeconomic History   • Marital status: /Civil Union     Spouse name: None   • Number of children: 2   • Years of education: None   • Highest education level: Bachelor's degree (e g , BA, AB, BS)   Occupational History   • Occupation: RN, weekends    Tobacco Use   • Smoking status: Never   • Smokeless tobacco: Never   Vaping Use   • Vaping Use: Never used   Substance and Sexual Activity   • Alcohol use: No   • Drug use: No   • Sexual activity: Not Currently     Partners: Male     Birth control/protection: Post-menopausal     Comment: not sexually active and acceptable to both   Other Topics Concern   • None   Social History Narrative    Daily cola consumption (__cans/day)    Daily tea consumption (1 cup/day)    Does not exercise     x25 years     Social Determinants of Health     Financial Resource Strain: Not on file   Food Insecurity: Not on file   Transportation Needs: Not on file   Physical Activity: Not on file   Stress: Not on file   Social Connections: Not on file   Intimate Partner Violence: Not on file   Housing Stability: Not on file       The following portions of the patient's history were reviewed and updated as appropriate: allergies, current medications, past family history, past medical history, past social history, past surgical history and problem list     Vital Signs  Vitals:    01/27/23 1106   BP: 118/80   BP Location: Right arm   Patient Position: Sitting   Cuff Size: Standard   Pulse: 72   SpO2: 98%   Weight: 64 4 kg (142 lb)   Height: 5' 6" (1 676 m)       Physical Exam:  General appearance: alert, cooperative, no distress;    HEENT: normocephalic, anicteric, no eye erythema or discharge, no oropharyngeal thrush; throat clearing during visit, hoarse voice  Neck: supple, trachea midline, no adenopathy  Lungs: CTA b/l, no rales, rhonchi, or wheezing, unlabored respirations  Heart: RRR, no murmur, rubs, or gallops  Abdomen: soft, non-tender, non-distended, normal bowel sounds, no masses or organomegaly  Rectal: deferred  Extremities: no cyanosis, clubbing, or edema  Musculoskeletal: normal gait  Skin: color and texture normal, no jaundice, no rashes or lesions  Psychiatric: alert and oriented, normal affect and behavior

## 2023-02-04 PROBLEM — Z13.6 SCREENING FOR CARDIOVASCULAR CONDITION: Status: RESOLVED | Noted: 2018-10-04 | Resolved: 2023-02-04

## 2023-02-14 ENCOUNTER — ANNUAL EXAM (OUTPATIENT)
Dept: OBGYN CLINIC | Facility: CLINIC | Age: 58
End: 2023-02-14

## 2023-02-14 VITALS
HEIGHT: 66 IN | WEIGHT: 145 LBS | SYSTOLIC BLOOD PRESSURE: 116 MMHG | BODY MASS INDEX: 23.3 KG/M2 | DIASTOLIC BLOOD PRESSURE: 74 MMHG

## 2023-02-14 DIAGNOSIS — Z11.51 SCREENING FOR HPV (HUMAN PAPILLOMAVIRUS): ICD-10-CM

## 2023-02-14 DIAGNOSIS — Z12.4 ENCOUNTER FOR SCREENING FOR MALIGNANT NEOPLASM OF CERVIX: ICD-10-CM

## 2023-02-14 DIAGNOSIS — Z01.419 WELL WOMAN EXAM WITH ROUTINE GYNECOLOGICAL EXAM: Primary | ICD-10-CM

## 2023-02-14 NOTE — PROGRESS NOTES
ASSESSMENT & PLAN: Desiree Mills is a 65 y o  [id number] with normal gynecologic exam     1   Routine well woman exam done today  2    Pap and HPV:Pap with HPV was done today  Current ASCCP Guidelines reviewed  Last Pap  [unfilled] :  no abnormalities  3  Mammogram ordered  Recommend yearly mammography  4   Post menopausal - discussed what to expect  Vaginal atrophy- advised to try OTC vaginal moisturizers 2-3x/ week  Okay to use emollient or barrier cream on vulva  5  The patient is not sexually active  6  The following were reviewed in today's visit: breast self exam, mammography screening ordered and menopause  7  Patient to return to office in 12 months for WWE  All questions have been answered to her satisfaction  CC:  Annual Gynecologic Examination    HPI: Desiree Mills is a 65 y o  [id number] who presents for annual gynecologic examination  She has the following concerns:  Vaginal dryness  Not sexually active  Denies any PMB or vaginal discharge  Used vaginal estradiol tablets a few times prior to this appointment  Having hot flushes  Health Maintenance:    She wears her seatbelt routinely  She does perform regular monthly self breast exams  She feels safe at home  Patients does follow a healthy diet        Past Medical History:   Diagnosis Date   • Allergic rhinitis     Resolved 2017    • Cervical neuritis    • Contact lens/glasses fitting    • COVID-19        • Disease of thyroid gland    • Dysphagia     mild stasis, dysmotility   • Hashimoto's thyroiditis    • Migraine 2020    Daily headaches   • Miscarriage    • Primary localized osteoarthritis of right knee 2018   • Sleep apnea     mild   • Varicella     As a child   • Vitamin D deficiency        Past Surgical History:   Procedure Laterality Date   •  SECTION     • COLONOSCOPY     • DILATION AND CURETTAGE OF UTERUS     • KNEE SURGERY Left     meniscus       Past OB/Gyn History:   No LMP recorded (lmp unknown)  Patient is postmenopausal   Menstrual History:  OB History        3    Para   2    Term   2            AB   1    Living   2       SAB   1    IAB        Ectopic        Multiple        Live Births   2           Obstetric Comments   24 yo daughter at [employer] a job 2 days after graduation, 15 yo son -senior-with autoimmune and heart block; he is a runner and will go to [employer]              No LMP recorded (lmp unknown)  Patient is postmenopausal        History of sexually transmitted infection No  Patient is not currently sexually active  heterosexual Birth control: none  Last Pap  [unfilled] :  no abnormalities  Family History   Problem Relation Age of Onset   • Hypertension Mother    • Hyperlipidemia Mother    • Osteoarthritis Mother    • Thyroid disease Mother    • Hyperthyroidism Mother    • Asthma Mother    • Coronary artery disease Mother    • Heart failure Mother          from heart failure after back surgery 10/21   • Heart attack Father    • Heart failure Father          from heart attack  at age 68   • Heart disease Father          from heart attack at age 68   • No Known Problems Sister    • No Known Problems Brother    • No Known Problems Daughter    • Heart block Son    • Arthritis Family    • Osteoporosis Family        Social History:  Social History     Socioeconomic History   • Marital status: /Civil Union     Spouse name: Not on file   • Number of children: 2   • Years of education: Not on file   • Highest education level:  Bachelor's degree (e g , BA, AB, BS)   Occupational History   • Occupation: RN, weekends    Tobacco Use   • Smoking status: Never   • Smokeless tobacco: Never   Vaping Use   • Vaping Use: Never used   Substance and Sexual Activity   • Alcohol use: No   • Drug use: No   • Sexual activity: Not Currently     Partners: Male     Birth control/protection: Post-menopausal     Comment: not sexually active and acceptable to both   Other Topics Concern   • Not on file   Social History Narrative    Daily cola consumption (__cans/day)    Daily tea consumption (1 cup/day)    Does not exercise     x25 years     Social Determinants of Health     Financial Resource Strain: Not on file   Food Insecurity: Not on file   Transportation Needs: Not on file   Physical Activity: Not on file   Stress: Not on file   Social Connections: Not on file   Intimate Partner Violence: Not on file   Housing Stability: Not on file     Presently lives with spouse  Patient is   Patient is currently employed Peds Nurse   No Known Allergies    Current Outpatient Medications:   •  Calcium-Cholecalciferol-Zinc (Viactiv Calcium Immune) 650-20-5 5 MG-MCG-MG CHEW, , Disp: , Rfl:   •  cetirizine (ZyrTEC) 10 MG chewable tablet, Chew 10 mg daily, Disp: , Rfl:   •  fluticasone (FLONASE) 50 mcg/act nasal spray, 1 spray into each nostril daily, Disp: , Rfl:   •  levothyroxine 75 mcg tablet, Take 75 mcg by mouth daily, Disp: , Rfl:   •  omeprazole (PriLOSEC) 20 mg delayed release capsule, Take 1 capsule (20 mg total) by mouth daily, Disp: 90 capsule, Rfl: 3    Review of Systems:  Review of Systems   Constitutional: Negative for activity change, chills, fever and unexpected weight change  HENT: Negative for congestion, ear pain, hearing loss and sore throat  Respiratory: Negative for cough, chest tightness and shortness of breath  Cardiovascular: Negative for chest pain and leg swelling  Gastrointestinal: Negative for abdominal pain, constipation, diarrhea, nausea and vomiting  Genitourinary: Negative for difficulty urinating, dysuria, frequency, menstrual problem, pelvic pain, vaginal discharge and vaginal pain  Skin: Negative for color change and rash  Neurological: Negative for dizziness, numbness and headaches  Psychiatric/Behavioral: Negative for agitation and confusion         Physical Exam:  /74 (BP Location: Right arm, Patient Position: Sitting, Cuff Size: Standard)   Ht 5' 6" (1 676 m)   Wt 65 8 kg (145 lb)   LMP  (LMP Unknown)   BMI 23 40 kg/m²    Physical Exam  Constitutional:       General: She is not in acute distress  Appearance: Normal appearance  She is normal weight  Genitourinary:      Vulva, bladder, rectum and urethral meatus normal       No lesions in the vagina  Right Labia: No rash, tenderness or lesions  Left Labia: No tenderness, lesions or rash  No labial fusion noted  No vaginal discharge or tenderness  No vaginal prolapse present  Mild vaginal atrophy present  Right Adnexa: not tender, not full and no mass present  Left Adnexa: not tender, not full and no mass present  No cervical motion tenderness or friability  Uterus is not enlarged or prolapsed  Breasts:     Breasts are soft  Right: No inverted nipple, mass, nipple discharge or skin change  Left: No inverted nipple, mass, nipple discharge or skin change  HENT:      Head: Normocephalic and atraumatic  Nose: Nose normal    Eyes:      Conjunctiva/sclera: Conjunctivae normal       Pupils: Pupils are equal, round, and reactive to light  Cardiovascular:      Rate and Rhythm: Normal rate and regular rhythm  Pulses: Normal pulses  Heart sounds: Normal heart sounds  Pulmonary:      Effort: Pulmonary effort is normal  No respiratory distress  Breath sounds: Normal breath sounds  No wheezing  Abdominal:      General: Abdomen is flat  There is no distension  Palpations: Abdomen is soft  Tenderness: There is no abdominal tenderness  There is no guarding  Musculoskeletal:         General: Normal range of motion  Cervical back: Normal range of motion and neck supple  Neurological:      General: No focal deficit present  Mental Status: She is alert and oriented to person, place, and time  Mental status is at baseline     Skin:     General: Skin is warm and dry  Psychiatric:         Mood and Affect: Mood normal          Behavior: Behavior normal          Thought Content: Thought content normal          Judgment: Judgment normal    Vitals and nursing note reviewed

## 2023-02-22 LAB
LAB AP GYN PRIMARY INTERPRETATION: NORMAL
Lab: NORMAL

## 2023-02-23 NOTE — TELEPHONE ENCOUNTER
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]    Admission Date: 2/22/2023  Expected Discharge Date:     Discharge Barriers Identified: None    Payor: [employer]ARE / Plan: [email or web address] CHOICES 65 / Product Type: Medicare Advantage /     Extended Emergency Contact Information  Primary Emergency Contact: Bala Garcia  Address: [address] [place] [address]           [place] [address] [facility]  Home Phone: [phone]  Mobile Phone: [phone]  Relation: Son  Secondary Emergency Contact: Bala Garcia   [place]  Relation: None    Discharge Plan A: Home with family         [facility] - [place], [place] - [address] [facility]  [address] [facility]  [place] [address]  Phone: [phone] Fax: [phone]      Initial Assessment (most recent)       Adult Discharge Assessment - 02/23/23 1110          Discharge Assessment    Assessment Type Discharge Planning Assessment     Confirmed/corrected address, phone number and insurance Yes     Confirmed Demographics Correct on Facesheet     Source of Information patient     Communicated TUCKER with patient/caregiver Date not available/Unable to determine     Reason For Admission abd pain     People in Home child(hiro), adult     Facility Arrived From: home     Do you expect to return to your current living situation? Yes     Do you have help at home or someone to help you manage your care at home? Yes     Who are your caregiver(s) and their phone number(s)? son     Prior to hospitilization cognitive status: Alert/Oriented     Current cognitive status: Alert/Oriented     Walking or Climbing Stairs ambulation difficulty, requires equipment     Equipment Currently Used at Home walker, rolling     Readmission within 30  Spoke with patient who states she has had intermittent facial numbness that "moves" accompanied by headache and occ muscle twitching for 8 days  She started her vaginal estrogen 3 weeks ago  She would like to know if there is any chance the vaginal estrogen could be causing her symptoms  Discussed that it is unlikely as it is not a systemic medication  She is currently using it twice weekly  She is considering a trial off it while her PCP is investigating the potential causes  She will keep me informed  days? No     Patient currently being followed by outpatient case management? No     Do you currently have service(s) that help you manage your care at home? No     Do you take prescription medications? Yes     Do you have prescription coverage? Yes     Do you have any problems affording any of your prescribed medications? No     Is the patient taking medications as prescribed? yes     Who is going to help you get home at discharge? son (Bala)     How do you get to doctors appointments? car, drives self;family or friend will provide     Are you on dialysis? No     Discharge Plan A Home with family     DME Needed Upon Discharge  none     Discharge Plan discussed with: Patient     Discharge Barriers Identified None                      Anticipated DC Dispo: home with son  Prior Level of Function: independent, use of walker  PCP: Baylee Aleman  Comments:  CM met with patient at bedside to introduce role and discuss d/c planning. Patient is independent, lives with adult son who will provide transport home. Patient confirmed address/contact info, denies use of HHC at this time. CM following.

## 2023-03-13 ENCOUNTER — HOSPITAL ENCOUNTER (OUTPATIENT)
Dept: MAMMOGRAPHY | Facility: HOSPITAL | Age: 58
Discharge: HOME | End: 2023-03-13
Attending: FAMILY MEDICINE

## 2023-03-13 VITALS — HEIGHT: 66 IN | WEIGHT: 145.06 LBS | BODY MASS INDEX: 23.31 KG/M2

## 2023-03-13 DIAGNOSIS — Z12.31 ENCOUNTER FOR SCREENING MAMMOGRAM FOR BREAST CANCER: ICD-10-CM

## 2023-03-13 LAB
25(OH)D3 SERPL-MCNC: 27 NG/ML (ref 30–100)
ALBUMIN SERPL-MCNC: 4.3 G/DL (ref 3.6–5.1)
ALBUMIN/GLOB SERPL: 1.5 (CALC) (ref 1–2.5)
ALP SERPL-CCNC: 80 U/L (ref 37–153)
ALT SERPL-CCNC: 16 U/L (ref 6–29)
AST SERPL-CCNC: 18 U/L (ref 10–35)
BASOPHILS # BLD AUTO: 32 CELLS/UL (ref 0–200)
BASOPHILS NFR BLD AUTO: 0.5 %
BILIRUB SERPL-MCNC: 0.5 MG/DL (ref 0.2–1.2)
BUN SERPL-MCNC: 15 MG/DL (ref 7–25)
BUN/CREAT SERPL: NORMAL (CALC) (ref 6–22)
CALCIUM SERPL-MCNC: 9.7 MG/DL (ref 8.6–10.4)
CHLORIDE SERPL-SCNC: 104 MMOL/L (ref 98–110)
CHOLEST SERPL-MCNC: 229 MG/DL
CHOLEST/HDLC SERPL: 3.9 (CALC)
CO2 SERPL-SCNC: 29 MMOL/L (ref 20–32)
CREAT SERPL-MCNC: 0.84 MG/DL (ref 0.5–1.03)
EOSINOPHIL # BLD AUTO: 83 CELLS/UL (ref 15–500)
EOSINOPHIL NFR BLD AUTO: 1.3 %
ERYTHROCYTE [DISTWIDTH] IN BLOOD BY AUTOMATED COUNT: 12.5 % (ref 11–15)
GFR/BSA.PRED SERPLBLD CYS-BASED-ARV: 82 ML/MIN/1.73M2
GLOBULIN SER CALC-MCNC: 2.9 G/DL (CALC) (ref 1.9–3.7)
GLUCOSE SERPL-MCNC: 95 MG/DL (ref 65–99)
HCT VFR BLD AUTO: 43.8 % (ref 35–45)
HDLC SERPL-MCNC: 59 MG/DL
HGB BLD-MCNC: 14.5 G/DL (ref 11.7–15.5)
LDLC SERPL CALC-MCNC: 149 MG/DL (CALC)
LYMPHOCYTES # BLD AUTO: 2502 CELLS/UL (ref 850–3900)
LYMPHOCYTES NFR BLD AUTO: 39.1 %
MCH RBC QN AUTO: 30.3 PG (ref 27–33)
MCHC RBC AUTO-ENTMCNC: 33.1 G/DL (ref 32–36)
MCV RBC AUTO: 91.6 FL (ref 80–100)
MONOCYTES # BLD AUTO: 454 CELLS/UL (ref 200–950)
MONOCYTES NFR BLD AUTO: 7.1 %
NEUTROPHILS # BLD AUTO: 3328 CELLS/UL (ref 1500–7800)
NEUTROPHILS NFR BLD AUTO: 52 %
NONHDLC SERPL-MCNC: 170 MG/DL (CALC)
PLATELET # BLD AUTO: 332 THOUSAND/UL (ref 140–400)
PMV BLD REES-ECKER: 10.1 FL (ref 7.5–12.5)
POTASSIUM SERPL-SCNC: 3.9 MMOL/L (ref 3.5–5.3)
PROT SERPL-MCNC: 7.2 G/DL (ref 6.1–8.1)
RBC # BLD AUTO: 4.78 MILLION/UL (ref 3.8–5.1)
SODIUM SERPL-SCNC: 141 MMOL/L (ref 135–146)
TRIGL SERPL-MCNC: 99 MG/DL
WBC # BLD AUTO: 6.4 THOUSAND/UL (ref 3.8–10.8)

## 2023-03-15 DIAGNOSIS — R92.8 ABNORMAL MAMMOGRAM: Primary | ICD-10-CM

## 2023-03-24 ENCOUNTER — HOSPITAL ENCOUNTER (OUTPATIENT)
Dept: ULTRASOUND IMAGING | Facility: CLINIC | Age: 58
Discharge: HOME | End: 2023-03-24

## 2023-03-24 ENCOUNTER — HOSPITAL ENCOUNTER (OUTPATIENT)
Dept: MAMMOGRAPHY | Facility: CLINIC | Age: 58
Discharge: HOME | End: 2023-03-24

## 2023-03-24 DIAGNOSIS — R92.8 ABNORMAL MAMMOGRAM: ICD-10-CM

## 2023-03-27 DIAGNOSIS — R92.8 ABNORMAL MAMMOGRAM: Primary | ICD-10-CM

## 2023-03-27 NOTE — RESULT ENCOUNTER NOTE
6-month follow-up ultrasound and mammogram is ordered for left breast, patient is aware of this recommendation

## 2023-06-09 ENCOUNTER — APPOINTMENT (OUTPATIENT)
Dept: LAB | Facility: CLINIC | Age: 58
End: 2023-06-09
Payer: COMMERCIAL

## 2023-06-09 DIAGNOSIS — R13.14 PHARYNGOESOPHAGEAL DYSPHAGIA: ICD-10-CM

## 2023-06-09 DIAGNOSIS — E06.3 HASHIMOTO'S THYROIDITIS: ICD-10-CM

## 2023-06-09 DIAGNOSIS — R13.14 DYSPHAGIA, PHARYNGOESOPHAGEAL PHASE: ICD-10-CM

## 2023-06-09 DIAGNOSIS — J38.01 PARESIS OF LEFT VOCAL FOLD: ICD-10-CM

## 2023-06-09 DIAGNOSIS — K21.9 REFLUX LARYNGITIS: ICD-10-CM

## 2023-06-09 DIAGNOSIS — R49.0 DYSPHONIA: ICD-10-CM

## 2023-06-09 DIAGNOSIS — Z91.89 RISK OF EXPOSURE TO LYME DISEASE: ICD-10-CM

## 2023-06-09 DIAGNOSIS — K21.00 GASTROESOPHAGEAL REFLUX DISEASE WITH ESOPHAGITIS WITHOUT HEMORRHAGE: ICD-10-CM

## 2023-06-09 DIAGNOSIS — K11.7 XEROSTOMIA: ICD-10-CM

## 2023-06-09 DIAGNOSIS — K22.4 ESOPHAGEAL DYSMOTILITY: ICD-10-CM

## 2023-06-09 DIAGNOSIS — Z91.89 PERSONAL HISTORY OF POISONING, PRESENTING HAZARDS TO HEALTH: ICD-10-CM

## 2023-06-09 DIAGNOSIS — J04.0 REFLUX LARYNGITIS: ICD-10-CM

## 2023-06-09 LAB
ANA SER QL IA: NEGATIVE
B BURGDOR IGG+IGM SER-ACNC: 0.2 AI

## 2023-06-09 PROCEDURE — 86364 TISS TRNSGLTMNASE EA IG CLAS: CPT

## 2023-06-09 PROCEDURE — 86618 LYME DISEASE ANTIBODY: CPT

## 2023-06-09 PROCEDURE — 36415 COLL VENOUS BLD VENIPUNCTURE: CPT

## 2023-06-09 PROCEDURE — 86003 ALLG SPEC IGE CRUDE XTRC EA: CPT

## 2023-06-09 PROCEDURE — 86038 ANTINUCLEAR ANTIBODIES: CPT

## 2023-06-09 PROCEDURE — 86258 DGP ANTIBODY EACH IG CLASS: CPT

## 2023-07-30 PROBLEM — R13.14 PHARYNGOESOPHAGEAL DYSPHAGIA: Status: ACTIVE | Noted: 2022-10-21

## 2023-07-30 PROBLEM — K21.00 GASTROESOPHAGEAL REFLUX DISEASE WITH ESOPHAGITIS WITHOUT HEMORRHAGE: Status: ACTIVE | Noted: 2023-07-30

## 2023-07-30 PROBLEM — K22.4 ESOPHAGEAL DYSMOTILITY: Status: ACTIVE | Noted: 2023-07-30

## 2023-07-30 PROBLEM — J38.01 PARESIS OF LEFT VOCAL FOLD: Status: ACTIVE | Noted: 2023-07-30

## 2023-07-30 PROBLEM — R49.0 DYSPHONIA: Status: ACTIVE | Noted: 2023-07-30

## 2023-07-30 PROBLEM — K21.9 REFLUX LARYNGITIS: Status: ACTIVE | Noted: 2023-07-30

## 2023-07-30 PROBLEM — K11.7 XEROSTOMIA: Status: ACTIVE | Noted: 2023-07-30

## 2023-07-30 PROBLEM — J04.0 REFLUX LARYNGITIS: Status: ACTIVE | Noted: 2023-07-30

## 2023-09-27 ENCOUNTER — HOSPITAL ENCOUNTER (OUTPATIENT)
Dept: RADIOLOGY | Facility: HOSPITAL | Age: 58
Discharge: HOME | End: 2023-09-27
Attending: FAMILY MEDICINE
Payer: COMMERCIAL

## 2023-09-27 DIAGNOSIS — R92.8 ABNORMAL MAMMOGRAM: ICD-10-CM

## 2023-09-27 PROCEDURE — 77065 DX MAMMO INCL CAD UNI: CPT

## 2023-09-27 PROCEDURE — G0279 TOMOSYNTHESIS, MAMMO: HCPCS

## 2023-09-27 PROCEDURE — 76642 ULTRASOUND BREAST LIMITED: CPT

## 2023-09-28 DIAGNOSIS — R92.8 ABNORMAL MAMMOGRAM: Primary | ICD-10-CM

## 2023-09-28 NOTE — RESULT ENCOUNTER NOTE
Please inform the patient which she has stable nodule, no change from previous studies, and is recommended follow-up ultrasound left breast 6-month and diagnostic mammogram in 6 months for both breast and order has been placed

## 2023-09-29 ENCOUNTER — TELEPHONE (OUTPATIENT)
Dept: FAMILY MEDICINE CLINIC | Facility: CLINIC | Age: 58
End: 2023-09-29

## 2023-09-29 NOTE — TELEPHONE ENCOUNTER
----- Message from Walter Montenegro MD sent at 9/28/2023 10:24 AM EDT -----  Please inform the patient which she has stable nodule, no change from previous studies, and is recommended follow-up ultrasound left breast 6-month and diagnostic mammogram in 6 months for both breast and order has been placed

## 2023-12-12 ENCOUNTER — OFFICE VISIT (OUTPATIENT)
Dept: FAMILY MEDICINE CLINIC | Facility: CLINIC | Age: 58
End: 2023-12-12
Payer: COMMERCIAL

## 2023-12-12 VITALS
HEART RATE: 81 BPM | SYSTOLIC BLOOD PRESSURE: 110 MMHG | DIASTOLIC BLOOD PRESSURE: 70 MMHG | RESPIRATION RATE: 16 BRPM | OXYGEN SATURATION: 99 % | WEIGHT: 143 LBS | HEIGHT: 66 IN | BODY MASS INDEX: 22.98 KG/M2

## 2023-12-12 DIAGNOSIS — E78.2 MIXED HYPERLIPIDEMIA: ICD-10-CM

## 2023-12-12 DIAGNOSIS — Z23 ENCOUNTER FOR IMMUNIZATION: ICD-10-CM

## 2023-12-12 DIAGNOSIS — Z00.00 ANNUAL PHYSICAL EXAM: Primary | ICD-10-CM

## 2023-12-12 DIAGNOSIS — Z78.0 MENOPAUSE: ICD-10-CM

## 2023-12-12 PROCEDURE — 90471 IMMUNIZATION ADMIN: CPT | Performed by: FAMILY MEDICINE

## 2023-12-12 PROCEDURE — 90715 TDAP VACCINE 7 YRS/> IM: CPT | Performed by: FAMILY MEDICINE

## 2023-12-12 PROCEDURE — 99396 PREV VISIT EST AGE 40-64: CPT | Performed by: FAMILY MEDICINE

## 2023-12-12 NOTE — ASSESSMENT & PLAN NOTE
Adacel vaccine is given, she follows ENT for her postnasal drip and she tried omeprazole and does not think is helping but she is trying to wean off slowly,  Labs ordered, previously high cholesterol and she has improved her diet

## 2023-12-12 NOTE — PROGRESS NOTES
ADULT ANNUAL [facility]    NAME: J Carlos Ramos  AGE: 63 y.o. SEX: female  : 1965     DATE: 2023     Assessment and Plan:     Problem List Items Addressed This Visit        Other    Annual physical exam - Primary     Adacel vaccine is given, she follows ENT for her postnasal drip and she tried omeprazole and does not think is helping but she is trying to wean off slowly,  Labs ordered, previously high cholesterol and she has improved her diet         Relevant Orders    CBC and differential    Comprehensive metabolic panel    Lipid panel    Mixed hyperlipidemia    Relevant Orders    CBC and differential    Comprehensive metabolic panel    Lipid panel    Menopause    Relevant Orders    DXA bone density spine hip and pelvis         Immunizations and preventive care screenings were discussed with patient today. Appropriate education was printed on patient's after visit summary. Counseling:  Dental Health: discussed importance of regular tooth brushing, flossing, and dental visits. Exercise: the importance of regular exercise/physical activity was discussed. Recommend exercise 3-5 times per week for at least 30 minutes. Depression Screening and Follow-up Plan: Patient was screened for depression during today's encounter. They screened negative with a PHQ-2 score of 0. No follow-ups on file. Chief Complaint:     Chief Complaint   Patient presents with   • Annual Exam      History of Present Illness:     Adult Annual Physical   Patient here for a comprehensive physical exam. The patient reports no problems. Diet and Physical Activity  Diet/Nutrition: well balanced diet and low fat diet. Exercise: walking.       Depression Screening  PHQ-2/9 Depression Screening    Little interest or pleasure in doing things: 0 - not at all  Feeling down, depressed, or hopeless: 0 - not at all  PHQ-2 Score: 0  PHQ-2 Interpretation: Negative depression screen       General Health  Sleep: sleeps well. Hearing: normal - bilateral.  Vision: no vision problems. Dental: regular dental visits. /GYN Health  Follows with gynecology? yes   Patient is: premenopausal  Last menstrual period: at age 49         Review of Systems:     Review of Systems   Constitutional:  Negative for activity change, appetite change, chills, fatigue, fever and unexpected weight change. HENT:  Negative for congestion, ear discharge, ear pain, nosebleeds, postnasal drip, rhinorrhea, sinus pressure, sneezing, sore throat, trouble swallowing and voice change. Eyes:  Negative for photophobia, pain, discharge, redness and itching. Respiratory:  Negative for cough, chest tightness, shortness of breath and wheezing. Cardiovascular:  Negative for chest pain, palpitations and leg swelling. Gastrointestinal:  Negative for abdominal pain, constipation, diarrhea, nausea and vomiting. Endocrine: Negative for polyuria. Genitourinary:  Negative for dysuria, frequency and urgency. Musculoskeletal:  Negative for arthralgias, back pain, myalgias and neck pain. Skin:  Negative for color change, pallor and rash. Allergic/Immunologic: Negative for environmental allergies and food allergies. Neurological:  Negative for dizziness, weakness, light-headedness and headaches. Hematological:  Negative for adenopathy. Does not bruise/bleed easily. Psychiatric/Behavioral:  Negative for behavioral problems. The patient is not nervous/anxious.        Past Medical History:     Past Medical History:   Diagnosis Date   • Allergic rhinitis     Resolved 8/23/2017    • Cervical neuritis    • Contact lens/glasses fitting    • COVID-19     12/20   • Disease of thyroid gland    • Dysphagia     mild stasis, dysmotility   • Hashimoto's thyroiditis    • Migraine 08/24/2020    Daily headaches   • Miscarriage 1996   • Primary localized osteoarthritis of right knee 07/31/2018   • Sleep apnea     mild   • Varicella     As a child   • Vitamin D deficiency       Past Surgical History:     Past Surgical History:   Procedure Laterality Date   •  SECTION     • COLONOSCOPY     • DILATION AND CURETTAGE OF UTERUS     • KNEE SURGERY Left     meniscus      Social History:     Social History     Socioeconomic History   • Marital status: /Civil Union     Spouse name: None   • Number of children: 2   • Years of education: None   • Highest education level:  Bachelor's degree (e.g., BA, AB, BS)   Occupational History   • Occupation: RN, weekends    Tobacco Use   • Smoking status: Never   • Smokeless tobacco: Never   Vaping Use   • Vaping Use: Never used   Substance and Sexual Activity   • Alcohol use: No   • Drug use: No   • Sexual activity: Not Currently     Partners: Male     Birth control/protection: Post-menopausal     Comment: not sexually active and acceptable to both   Other Topics Concern   • None   Social History Narrative    Daily cola consumption (__cans/day)    Daily tea consumption (1 cup/day)    Does not exercise     x25 years     Social Determinants of Health     Financial Resource Strain: Not on file   Food Insecurity: Not on file   Transportation Needs: Not on file   Physical Activity: Not on file   Stress: Not on file   Social Connections: Not on file   Intimate Partner Violence: Not on file   Housing Stability: Not on file      Family History:     Family History   Problem Relation Age of Onset   • Hypertension Mother    • Hyperlipidemia Mother    • Osteoarthritis Mother    • Thyroid disease Mother    • Hyperthyroidism Mother    • Asthma Mother    • Coronary artery disease Mother    • Heart failure Mother          from heart failure after back surgery 10/21   • Heart attack Father    • Heart failure Father          from heart attack  at age 68   • Heart disease Father          from heart attack at age 68   • No Known Problems Sister    • No Known Problems Daughter    • No Known Problems Brother    • Heart block Son    • No Known Problems Maternal Aunt    • No Known Problems Maternal Aunt    • No Known Problems Maternal Aunt    • No Known Problems Maternal Aunt    • Arthritis Family    • Osteoporosis Family       Current Medications:     Current Outpatient Medications   Medication Sig Dispense Refill   • Cholecalciferol 25 MCG (1000 UT) tablet Take 1,000 Units by mouth daily     • Calcium-Cholecalciferol-Zinc (Viactiv Calcium Immune) 650-20-5.5 MG-MCG-MG CHEW      • cetirizine (ZyrTEC) 10 MG chewable tablet Chew 10 mg daily     • famotidine (PEPCID) 40 MG tablet Take 1 tablet (40 mg total) by mouth daily at bedtime 90 tablet 3   • fluticasone (FLONASE) 50 mcg/act nasal spray 1 spray into each nostril daily     • levothyroxine 75 mcg tablet Take 75 mcg by mouth daily     • omeprazole (PriLOSEC) 20 mg delayed release capsule Take 1 capsule (20 mg total) by mouth every morning 30 min before breakfast 90 capsule 3     No current facility-administered medications for this visit. Allergies:     No Known Allergies   Physical Exam:     /70   Pulse 81   Resp 16   Ht 5' 6" (1.676 m)   Wt 64.9 kg (143 lb)   LMP  (LMP Unknown)   SpO2 99%   BMI 23.08 kg/m²     Physical Exam  Vitals and nursing note reviewed. Constitutional:       General: She is not in acute distress. Appearance: Normal appearance. She is not ill-appearing. HENT:      Head: Normocephalic and atraumatic. Right Ear: Tympanic membrane and ear canal normal.      Left Ear: Tympanic membrane and ear canal normal.      Nose: Nose normal. No congestion or rhinorrhea. Mouth/Throat:      Mouth: Mucous membranes are moist.      Pharynx: Oropharynx is clear. No oropharyngeal exudate or posterior oropharyngeal erythema. Eyes:      General: No scleral icterus. Right eye: No discharge. Left eye: No discharge.       Extraocular Movements: Extraocular movements intact. Conjunctiva/sclera: Conjunctivae normal.      Pupils: Pupils are equal, round, and reactive to light. Cardiovascular:      Rate and Rhythm: Normal rate and regular rhythm. Heart sounds: Normal heart sounds. No murmur heard. Pulmonary:      Effort: Pulmonary effort is normal.      Breath sounds: Normal breath sounds. No wheezing or rales. Abdominal:      General: Abdomen is flat. Bowel sounds are normal. There is no distension. Palpations: Abdomen is soft. There is no mass. Tenderness: There is no abdominal tenderness. Musculoskeletal:         General: No swelling, tenderness or deformity. Normal range of motion. Cervical back: Normal range of motion and neck supple. No muscular tenderness. Right lower leg: No edema. Left lower leg: No edema. Lymphadenopathy:      Cervical: No cervical adenopathy. Skin:     Coloration: Skin is not jaundiced or pale. Findings: No erythema, lesion or rash. Neurological:      General: No focal deficit present. Mental Status: She is alert and oriented to person, place, and time.       Gait: Gait normal.   Psychiatric:         Mood and Affect: Mood normal.         Behavior: Behavior normal.          Tequila Zepeda MD  [address] [place] [address]

## 2023-12-12 NOTE — PATIENT INSTRUCTIONS
Wellness Visit for Adults   AMBULATORY CARE:   A wellness visit  is when you see your healthcare provider to get screened for health problems. Your healthcare provider will also give you advice on how to stay healthy. Write down your questions so you remember to ask them. Ask your healthcare provider how often you should have a wellness visit. What happens at a wellness visit:  Your healthcare provider will ask about your health, and your family history of health problems. This includes high blood pressure, heart disease, and cancer. He or she will ask if you have symptoms that concern you, if you smoke, and about your mood. You may also be asked about your intake of medicines, supplements, food, and alcohol. Any of the following may be done: Your weight  will be checked. Your height may also be checked so your body mass index (BMI) can be calculated. Your BMI shows if you are at a healthy weight. Your blood pressure  and heart rate will be checked. Your temperature may also be checked. Blood and urine tests  may be done. Blood tests may be done to check your cholesterol levels. Abnormal cholesterol levels increase your risk for heart disease and stroke. You may also need a blood or urine test to check for diabetes if you are at increased risk. Urine tests may be done to look for signs of an infection or kidney disease. A physical exam  includes checking your heartbeat and lungs with a stethoscope. Your healthcare provider may also check your skin to look for sun damage. Screening tests  may be recommended. A screening test is done to check for diseases that may not cause symptoms. The screening tests you may need depend on your age, gender, family history, and lifestyle habits. For example, colorectal screening may be recommended if you are 48years old or older. Screening tests you need if you are a woman:   A Pap smear  is used to screen for cervical cancer.  Pap smears are usually done every 3 to 5 years depending on your age. You may need them more often if you have had abnormal Pap smear test results in the past. Ask your healthcare provider how often you should have a Pap smear. A mammogram  is an x-ray of your breasts to screen for breast cancer. Experts recommend mammograms every 2 years starting at age 48 years. You may need a mammogram at age 52 years or younger if you have an increased risk for breast cancer. Talk to your healthcare provider about when you should start having mammograms and how often you need them. Vaccines you may need:   Get an influenza vaccine  every year. The influenza vaccine protects you from the flu. Several types of viruses cause the flu. The viruses change over time, so new vaccines are made each year. Get a tetanus-diphtheria (Td) booster vaccine  every 10 years. This vaccine protects you against tetanus and diphtheria. Tetanus is a severe infection that may cause painful muscle spasms and lockjaw. Diphtheria is a severe bacterial infection that causes a thick covering in the back of your mouth and throat. Get a human papillomavirus (HPV) vaccine  if you are female and aged 23 to 32 or male 23 to 24 and never received it. This vaccine protects you from HPV infection. HPV is the most common infection spread by sexual contact. HPV may also cause vaginal, penile, and anal cancers. Get a pneumococcal vaccine  if you are aged 72 years or older. The pneumococcal vaccine is an injection given to protect you from pneumococcal disease. Pneumococcal disease is an infection caused by pneumococcal bacteria. The infection may cause pneumonia, meningitis, or an ear infection. Get a shingles vaccine  if you are 60 or older, even if you have had shingles before. The shingles vaccine is an injection to protect you from the varicella-zoster virus. This is the same virus that causes chickenpox.  Shingles is a painful rash that develops in people who had chickenpox or have been exposed to the virus. How to eat healthy:  My Plate is a model for planning healthy meals. It shows the types and amounts of foods that should go on your plate. Fruits and vegetables make up about half of your plate, and grains and protein make up the other half. A serving of dairy is included on the side of your plate. The amount of calories and serving sizes you need depends on your age, gender, weight, and height. Examples of healthy foods are listed below:  Eat a variety of vegetables  such as dark green, red, and orange vegetables. You can also include canned vegetables low in sodium (salt) and frozen vegetables without added butter or sauces. Eat a variety of fresh fruits , canned fruit in 100% juice, frozen fruit, and dried fruit. Include whole grains. At least half of the grains you eat should be whole grains. Examples include whole-wheat bread, wheat pasta, brown rice, and whole-grain cereals such as oatmeal.    Eat a variety of protein foods such as seafood (fish and shellfish), lean meat, and poultry without skin (turkey and chicken). Examples of lean meats include pork leg, shoulder, or tenderloin, and beef round, sirloin, tenderloin, and extra lean ground beef. Other protein foods include eggs and egg substitutes, beans, peas, soy products, nuts, and seeds. Choose low-fat dairy products such as skim or 1% milk or low-fat yogurt, cheese, and cottage cheese. Limit unhealthy fats  such as butter, hard margarine, and shortening. Exercise:  Exercise at least 30 minutes per day on most days of the week. Some examples of exercise include walking, biking, dancing, and swimming. You can also fit in more physical activity by taking the stairs instead of the elevator or parking farther away from stores. Include muscle strengthening activities 2 days each week. Regular exercise provides many health benefits.  It helps you manage your weight, and decreases your risk for type 2 diabetes, heart disease, stroke, and high blood pressure. Exercise can also help improve your mood. Ask your healthcare provider about the best exercise plan for you. General health and safety guidelines:   Do not smoke. Nicotine and other chemicals in cigarettes and cigars can cause lung damage. Ask your healthcare provider for information if you currently smoke and need help to quit. E-cigarettes or smokeless tobacco still contain nicotine. Talk to your healthcare provider before you use these products. Limit alcohol. A drink of alcohol is 12 ounces of beer, 5 ounces of wine, or 1½ ounces of liquor. Lose weight, if needed. Being overweight increases your risk of certain health conditions. These include heart disease, high blood pressure, type 2 diabetes, and certain types of cancer. Protect your skin. Do not sunbathe or use tanning beds. Use sunscreen with a SPF 15 or higher. Apply sunscreen at least 15 minutes before you go outside. Reapply sunscreen every 2 hours. Wear protective clothing, hats, and sunglasses when you are outside. Drive safely. Always wear your seatbelt. Make sure everyone in your car wears a seatbelt. A seatbelt can save your life if you are in an accident. Do not use your cell phone when you are driving. This could distract you and cause an accident. Pull over if you need to make a call or send a text message. Practice safe sex. Use latex condoms if are sexually active and have more than one partner. Your healthcare provider may recommend screening tests for sexually transmitted infections (STIs). Wear helmets, lifejackets, and protective gear. Always wear a helmet when you ride a bike or motorcycle, go skiing, or play sports that could cause a head injury. Wear protective equipment when you play sports. Wear a lifejacket when you are on a boat or doing water sports.     © Copyright [employer] 2023 Information is for End User's use only and may not be sold, redistributed or otherwise used for commercial purposes. The above information is an  only. It is not intended as medical advice for individual conditions or treatments. Talk to your doctor, nurse or pharmacist before following any medical regimen to see if it is safe and effective for you.

## 2024-01-30 ENCOUNTER — APPOINTMENT (EMERGENCY)
Dept: RADIOLOGY | Facility: HOSPITAL | Age: 59
End: 2024-01-30
Payer: COMMERCIAL

## 2024-01-30 ENCOUNTER — HOSPITAL ENCOUNTER (EMERGENCY)
Facility: HOSPITAL | Age: 59
Discharge: HOME | End: 2024-01-30
Attending: EMERGENCY MEDICINE
Payer: COMMERCIAL

## 2024-01-30 VITALS
SYSTOLIC BLOOD PRESSURE: 122 MMHG | OXYGEN SATURATION: 99 % | WEIGHT: 143.6 LBS | BODY MASS INDEX: 23.18 KG/M2 | TEMPERATURE: 97.6 F | HEART RATE: 95 BPM | RESPIRATION RATE: 20 BRPM | DIASTOLIC BLOOD PRESSURE: 73 MMHG

## 2024-01-30 DIAGNOSIS — R79.89 ELEVATED D-DIMER: Primary | ICD-10-CM

## 2024-01-30 DIAGNOSIS — R07.9 CHEST PAIN, UNSPECIFIED: ICD-10-CM

## 2024-01-30 LAB
ALBUMIN SERPL BCP-MCNC: 4.5 G/DL (ref 3.5–5)
ALP SERPL-CCNC: 76 U/L (ref 34–104)
ALT SERPL W P-5'-P-CCNC: 15 U/L (ref 7–52)
ANION GAP SERPL CALCULATED.3IONS-SCNC: 9 MMOL/L
APTT PPP: 31 SECONDS (ref 23–37)
AST SERPL W P-5'-P-CCNC: 15 U/L (ref 13–39)
BASOPHILS # BLD AUTO: 0.05 THOUSANDS/ÂΜL (ref 0–0.1)
BASOPHILS NFR BLD AUTO: 1 % (ref 0–1)
BILIRUB SERPL-MCNC: 0.44 MG/DL (ref 0.2–1)
BNP SERPL-MCNC: 38 PG/ML (ref 0–100)
BUN SERPL-MCNC: 13 MG/DL (ref 5–25)
CALCIUM SERPL-MCNC: 9.9 MG/DL (ref 8.4–10.2)
CARDIAC TROPONIN I PNL SERPL HS: <2 NG/L
CHLORIDE SERPL-SCNC: 105 MMOL/L (ref 96–108)
CO2 SERPL-SCNC: 26 MMOL/L (ref 21–32)
CREAT SERPL-MCNC: 0.82 MG/DL (ref 0.6–1.3)
D DIMER PPP FEU-MCNC: 2.83 UG/ML FEU
EOSINOPHIL # BLD AUTO: 0.1 THOUSAND/ÂΜL (ref 0–0.61)
EOSINOPHIL NFR BLD AUTO: 1 % (ref 0–6)
ERYTHROCYTE [DISTWIDTH] IN BLOOD BY AUTOMATED COUNT: 12.2 % (ref 11.6–15.1)
GFR SERPL CREATININE-BSD FRML MDRD: 79 ML/MIN/1.73SQ M
GLUCOSE SERPL-MCNC: 90 MG/DL (ref 65–140)
HCT VFR BLD AUTO: 45.1 % (ref 34.8–46.1)
HGB BLD-MCNC: 15.1 G/DL (ref 11.5–15.4)
IMM GRANULOCYTES # BLD AUTO: 0.02 THOUSAND/UL (ref 0–0.2)
IMM GRANULOCYTES NFR BLD AUTO: 0 % (ref 0–2)
INR PPP: 0.92 (ref 0.84–1.19)
LYMPHOCYTES # BLD AUTO: 2.56 THOUSANDS/ÂΜL (ref 0.6–4.47)
LYMPHOCYTES NFR BLD AUTO: 30 % (ref 14–44)
MCH RBC QN AUTO: 31.3 PG (ref 26.8–34.3)
MCHC RBC AUTO-ENTMCNC: 33.5 G/DL (ref 31.4–37.4)
MCV RBC AUTO: 94 FL (ref 82–98)
MONOCYTES # BLD AUTO: 0.63 THOUSAND/ÂΜL (ref 0.17–1.22)
MONOCYTES NFR BLD AUTO: 7 % (ref 4–12)
NEUTROPHILS # BLD AUTO: 5.27 THOUSANDS/ÂΜL (ref 1.85–7.62)
NEUTS SEG NFR BLD AUTO: 61 % (ref 43–75)
NRBC BLD AUTO-RTO: 0 /100 WBCS
PLATELET # BLD AUTO: 323 THOUSANDS/UL (ref 149–390)
PMV BLD AUTO: 9.7 FL (ref 8.9–12.7)
POTASSIUM SERPL-SCNC: 3.7 MMOL/L (ref 3.5–5.3)
PROT SERPL-MCNC: 7.3 G/DL (ref 6.4–8.4)
PROTHROMBIN TIME: 12.6 SECONDS (ref 11.6–14.5)
RBC # BLD AUTO: 4.82 MILLION/UL (ref 3.81–5.12)
SODIUM SERPL-SCNC: 140 MMOL/L (ref 135–147)
TSH SERPL DL<=0.05 MIU/L-ACNC: 2.21 UIU/ML (ref 0.45–4.5)
WBC # BLD AUTO: 8.63 THOUSAND/UL (ref 4.31–10.16)

## 2024-01-30 PROCEDURE — 99284 EMERGENCY DEPT VISIT MOD MDM: CPT | Performed by: PHYSICIAN ASSISTANT

## 2024-01-30 PROCEDURE — 84443 ASSAY THYROID STIM HORMONE: CPT | Performed by: PHYSICIAN ASSISTANT

## 2024-01-30 PROCEDURE — 96360 HYDRATION IV INFUSION INIT: CPT

## 2024-01-30 PROCEDURE — 99285 EMERGENCY DEPT VISIT HI MDM: CPT

## 2024-01-30 PROCEDURE — 85730 THROMBOPLASTIN TIME PARTIAL: CPT | Performed by: PHYSICIAN ASSISTANT

## 2024-01-30 PROCEDURE — 85610 PROTHROMBIN TIME: CPT | Performed by: PHYSICIAN ASSISTANT

## 2024-01-30 PROCEDURE — G1004 CDSM NDSC: HCPCS

## 2024-01-30 PROCEDURE — 36415 COLL VENOUS BLD VENIPUNCTURE: CPT | Performed by: PHYSICIAN ASSISTANT

## 2024-01-30 PROCEDURE — 93005 ELECTROCARDIOGRAM TRACING: CPT

## 2024-01-30 PROCEDURE — 71275 CT ANGIOGRAPHY CHEST: CPT

## 2024-01-30 PROCEDURE — 85025 COMPLETE CBC W/AUTO DIFF WBC: CPT | Performed by: PHYSICIAN ASSISTANT

## 2024-01-30 PROCEDURE — 83880 ASSAY OF NATRIURETIC PEPTIDE: CPT | Performed by: PHYSICIAN ASSISTANT

## 2024-01-30 PROCEDURE — 80053 COMPREHEN METABOLIC PANEL: CPT | Performed by: PHYSICIAN ASSISTANT

## 2024-01-30 PROCEDURE — 85379 FIBRIN DEGRADATION QUANT: CPT | Performed by: PHYSICIAN ASSISTANT

## 2024-01-30 PROCEDURE — 84484 ASSAY OF TROPONIN QUANT: CPT | Performed by: PHYSICIAN ASSISTANT

## 2024-01-30 RX ADMIN — SODIUM CHLORIDE 1000 ML: 0.9 INJECTION, SOLUTION INTRAVENOUS at 15:59

## 2024-01-30 RX ADMIN — IOHEXOL 85 ML: 350 INJECTION, SOLUTION INTRAVENOUS at 15:29

## 2024-01-30 NOTE — ED PROVIDER NOTES
"History  Chief Complaint   Patient presents with    Shortness of Breath    Chest Pain     States started with SOB 10 days ago after shoveling snow. Also, experiencing intermittent \" chest pressure \" and palpitations. Originally started with these issues in 2020 after having Covid and had seen cardiologist and had a Cardiac workup and Holter monitor. EKG in progress. No pain at present . Seen at a Urgent Care on 1/26 and had EKG done.      58-year-old female presenting today with intermittent chest pain, shortness of breath and palpitations over the past 10 days accompanied with significant fatigue.  States that this has been an ongoing issue over the past few years ever since being diagnosed with COVID, she was told that she likely has long COVID however her symptoms are largely intermittent.  States that 10 days ago she started with some chest tightness lasting approximately 3 hours after she was shoveling.  Patient relates she has been seen multiple times for this and always has an elevated D-dimer however she does become concerned for potential clots.  States that she has had extensive workups for multiple specialists including ENT, cardiology, pulmonology, endocrinology, and has had pelvic ultrasounds etc.  Has not had any recent viral-like illnesses.  Denies nausea, vomiting, current chest or abdominal pain, flank pain, calf pain or swelling.  Differential clues but is not limited to ACS, electrolyte disturbance, etc.        Prior to Admission Medications   Prescriptions Last Dose Informant Patient Reported? Taking?   Calcium-Cholecalciferol-Zinc (Viactiv Calcium Immune) 650-20-5.5 MG-MCG-MG CHEW  Self Yes No   Cholecalciferol 25 MCG (1000 UT) tablet   Yes No   Sig: Take 1,000 Units by mouth daily   cetirizine (ZyrTEC) 10 MG chewable tablet  Self Yes No   Sig: Chew 10 mg daily   famotidine (PEPCID) 40 MG tablet   No No   Sig: Take 1 tablet (40 mg total) by mouth daily at bedtime   fluticasone (FLONASE) 50 " mcg/act nasal spray  Self Yes No   Si spray into each nostril daily   levothyroxine 75 mcg tablet  Self Yes No   Sig: Take 75 mcg by mouth daily   omeprazole (PriLOSEC) 20 mg delayed release capsule   No No   Sig: Take 1 capsule (20 mg total) by mouth every morning 30 min before breakfast      Facility-Administered Medications: None       Past Medical History:   Diagnosis Date    Allergic rhinitis     Resolved 2017     Cervical neuritis     Contact lens/glasses fitting     COVID-19         Disease of thyroid gland     Dysphagia     mild stasis, dysmotility    Hashimoto's thyroiditis     Migraine 2020    Daily headaches    Miscarriage 1996    Primary localized osteoarthritis of right knee 2018    Sleep apnea     mild    Varicella     As a child    Vitamin D deficiency        Past Surgical History:   Procedure Laterality Date     SECTION      COLONOSCOPY      DILATION AND CURETTAGE OF UTERUS      KNEE SURGERY Left     meniscus       Family History   Problem Relation Age of Onset    Hypertension Mother     Hyperlipidemia Mother     Osteoarthritis Mother     Thyroid disease Mother     Hyperthyroidism Mother     Asthma Mother     Coronary artery disease Mother     Heart failure Mother          from heart failure after back surgery 10/21    Heart attack Father     Heart failure Father          from heart attack  at age 76    Heart disease Father          from heart attack at age 76    No Known Problems Sister     No Known Problems Daughter     No Known Problems Brother     Heart block Son     No Known Problems Maternal Aunt     No Known Problems Maternal Aunt     No Known Problems Maternal Aunt     No Known Problems Maternal Aunt     Arthritis Family     Osteoporosis Family      I have reviewed and agree with the history as documented.    E-Cigarette/Vaping    E-Cigarette Use Never User      E-Cigarette/Vaping Substances    Nicotine No     THC No     CBD No      Flavoring No     Other No     Unknown No      Social History     Tobacco Use    Smoking status: Never    Smokeless tobacco: Never   Vaping Use    Vaping status: Never Used   Substance Use Topics    Alcohol use: No    Drug use: No       Review of Systems   Constitutional:  Positive for fatigue. Negative for activity change, appetite change, chills and fever.   HENT: Negative.  Negative for congestion, postnasal drip, rhinorrhea and sore throat.    Eyes: Negative.    Respiratory:  Positive for chest tightness and shortness of breath. Negative for apnea, cough, choking, wheezing and stridor.    Cardiovascular:  Positive for chest pain. Negative for palpitations and leg swelling.   Gastrointestinal: Negative.  Negative for abdominal pain, diarrhea, nausea and vomiting.   Endocrine: Negative.    Genitourinary: Negative.    Musculoskeletal: Negative.    Skin: Negative.    Neurological: Negative.    Hematological: Negative.    Psychiatric/Behavioral: Negative.     All other systems reviewed and are negative.      Physical Exam  Physical Exam  Vitals and nursing note reviewed.   Constitutional:       General: She is not in acute distress.     Appearance: Normal appearance. She is well-developed and normal weight. She is not diaphoretic.   HENT:      Head: Normocephalic and atraumatic.      Right Ear: External ear normal.      Left Ear: External ear normal.      Nose: Nose normal.      Mouth/Throat:      Pharynx: No oropharyngeal exudate.   Eyes:      General: No scleral icterus.        Right eye: No discharge.         Left eye: No discharge.      Conjunctiva/sclera: Conjunctivae normal.      Pupils: Pupils are equal, round, and reactive to light.   Cardiovascular:      Rate and Rhythm: Normal rate and regular rhythm.      Pulses: Normal pulses.      Heart sounds: Normal heart sounds. No murmur heard.     No friction rub. No gallop.   Pulmonary:      Effort: Pulmonary effort is normal. No respiratory distress.      Breath  sounds: Normal breath sounds. No stridor. No wheezing, rhonchi or rales.   Chest:      Chest wall: No tenderness.   Abdominal:      General: Abdomen is flat. Bowel sounds are normal. There is no distension.      Palpations: Abdomen is soft. There is no mass.      Tenderness: There is no abdominal tenderness. There is no right CVA tenderness, guarding or rebound.      Hernia: No hernia is present.   Musculoskeletal:         General: Normal range of motion.      Cervical back: Normal range of motion and neck supple.      Right lower leg: No edema.      Left lower leg: No edema.   Lymphadenopathy:      Cervical: No cervical adenopathy.   Skin:     General: Skin is warm and dry.      Capillary Refill: Capillary refill takes less than 2 seconds.      Coloration: Skin is not pale.      Findings: No erythema or rash.   Neurological:      General: No focal deficit present.      Mental Status: She is alert and oriented to person, place, and time. Mental status is at baseline.   Psychiatric:         Mood and Affect: Mood normal.         Behavior: Behavior normal.         Thought Content: Thought content normal.         Judgment: Judgment normal.         Vital Signs  ED Triage Vitals [01/30/24 1234]   Temperature Pulse Respirations Blood Pressure SpO2   97.6 °F (36.4 °C) 95 20 122/73 99 %      Temp Source Heart Rate Source Patient Position - Orthostatic VS BP Location FiO2 (%)   Tympanic Monitor Sitting Left arm --      Pain Score       No Pain           Vitals:    01/30/24 1234   BP: 122/73   Pulse: 95   Patient Position - Orthostatic VS: Sitting         Visual Acuity      ED Medications  Medications   sodium chloride 0.9 % bolus 1,000 mL (1,000 mL Intravenous New Bag 1/30/24 1559)   iohexol (OMNIPAQUE) 350 MG/ML injection (MULTI-DOSE) 85 mL (85 mL Intravenous Given 1/30/24 1529)       Diagnostic Studies  Results Reviewed       Procedure Component Value Units Date/Time    Comprehensive metabolic panel [[id number]] Collected:  01/30/24 1401    Lab Status: Final result Specimen: Blood from Line, Venous Updated: 01/30/24 1525     Sodium 140 mmol/L      Potassium 3.7 mmol/L      Chloride 105 mmol/L      CO2 26 mmol/L      ANION GAP 9 mmol/L      BUN 13 mg/dL      Creatinine 0.82 mg/dL      Glucose 90 mg/dL      Calcium 9.9 mg/dL      AST 15 U/L      ALT 15 U/L      Alkaline Phosphatase 76 U/L      Total Protein 7.3 g/dL      Albumin 4.5 g/dL      Total Bilirubin 0.44 mg/dL      eGFR 79 ml/min/1.73sq m     Narrative:      National Kid[employer] guidelines for Chronic Kidney Disease (CKD):     Stage 1 with normal or high GFR (GFR > 90 mL/min/1.73 square meters)    Stage 2 Mild CKD (GFR = 60-89 mL/min/1.73 square meters)    Stage 3A Moderate CKD (GFR = 45-59 mL/min/1.73 square meters)    Stage 3B Moderate CKD (GFR = 30-44 mL/min/1.73 square meters)    Stage 4 Severe CKD (GFR = 15-29 mL/min/1.73 square meters)    Stage 5 End Stage CKD (GFR <15 mL/min/1.73 square meters)  Note: GFR calculation is accurate only with a steady state creatinine    TSH, 3rd generation with Free T4 reflex [[id number]]  (Normal) Collected: 01/30/24 1401    Lab Status: Final result Specimen: Blood from Line, Venous Updated: 01/30/24 1448     TSH 3RD GENERATON 2.205 uIU/mL     HS Troponin 0hr (reflex protocol) [[id number]]  (Normal) Collected: 01/30/24 1401    Lab Status: Final result Specimen: Blood from Line, Venous Updated: 01/30/24 1432     hs TnI 0hr <2 ng/L     B-Type Natriuretic Peptide(BNP) [[id number]]  (Normal) Collected: 01/30/24 1401    Lab Status: Final result Specimen: Blood from Line, Venous Updated: 01/30/24 1431     BNP 38 pg/mL     D-dimer, quantitative [[id number]]  (Abnormal) Collected: 01/30/24 1401    Lab Status: Final result Specimen: Blood from Line, Venous Updated: 01/30/24 1430     D-Dimer, Quant 2.83 ug/ml FEU     Narrative:      In the evaluation for possible pulmonary embolism, in the appropriate (Well's Score of 4 or less) patient,  the age adjusted d-dimer cutoff for this patient can be calculated as:    Age x 0.01 (in ug/mL) for Age-adjusted D-dimer exclusion threshold for a patient over 50 years.    Protime-INR [[id number]]  (Normal) Collected: 01/30/24 1401    Lab Status: Final result Specimen: Blood from Line, Venous Updated: 01/30/24 1428     Protime 12.6 seconds      INR 0.92    APTT [[id number]]  (Normal) Collected: 01/30/24 1401    Lab Status: Final result Specimen: Blood from Line, Venous Updated: 01/30/24 1428     PTT 31 seconds     CBC and differential [[id number]] Collected: 01/30/24 1401    Lab Status: Final result Specimen: Blood from Line, Venous Updated: 01/30/24 1421     WBC 8.63 Thousand/uL      RBC 4.82 Million/uL      Hemoglobin 15.1 g/dL      Hematocrit 45.1 %      MCV 94 fL      MCH 31.3 pg      MCHC 33.5 g/dL      RDW 12.2 %      MPV 9.7 fL      Platelets 323 Thousands/uL      nRBC 0 /100 WBCs      Neutrophils Relative 61 %      Immat GRANS % 0 %      Lymphocytes Relative 30 %      Monocytes Relative 7 %      Eosinophils Relative 1 %      Basophils Relative 1 %      Neutrophils Absolute 5.27 Thousands/µL      Immature Grans Absolute 0.02 Thousand/uL      Lymphocytes Absolute 2.56 Thousands/µL      Monocytes Absolute 0.63 Thousand/µL      Eosinophils Absolute 0.10 Thousand/µL      Basophils Absolute 0.05 Thousands/µL                    CTA ED chest PE Study   Final Result by Rafita Granda MD (01/30 1607)      No PE.   Cholelithiasis.               Workstation performed: [id number]                    Procedures  Procedures         ED Course  ED Course as of 01/30/24 1634   Tue Jan 30, 2024   1436 Patient updated regarding elevated D-dimer and would like to move forward with CT imaging.   1508 Calling lab, they have not run the CMP yet                                SBIRT 20yo+      Flowsheet Row Most Recent Value   Initial Alcohol Screen: US AUDIT-C     1. How often do you have a drink containing alcohol? 0 Filed at: 01/30/2024  1236   Audit-C Score 0 Filed at: 01/30/2024 1236   MARU: How many times in the past year have you...    Used an illegal drug or used a prescription medication for non-medical reasons? Never Filed at: 01/30/2024 1236                      Medical Decision Making  Discussed elevated D-dimer at 2.8 which is higher than patient's usual.  Patient would like to move forward with CT PE study to ensure no clot formation at this point in time.    Thorough conversation with patient regarding lab work and imaging studies.  CTA is negative for PE, continued cholelithiasis, no signs of cholecystitis.  Will have patient continue to follow-up with her pulmonologist and cardiologist.    Patient is informed to return to the emergency department for worsening of symptoms and was given proper education regarding their diagnosis and symptoms. Otherwise the patient is informed to follow up with their primary care doctor for re-evaluation. The patient verbalizes understanding and agrees with above assessment and plan. All questions were answered.          Amount and/or Complexity of Data Reviewed  Labs: ordered.  Radiology: ordered.    Risk  Prescription drug management.             Disposition  Final diagnoses:   Elevated d-dimer   Chest pain, unspecified     Time reflects when diagnosis was documented in both MDM as applicable and the Disposition within this note       Time User Action Codes Description Comment    1/30/2024  4:28 PM Angie Leo Add [R79.89] Elevated d-dimer     1/30/2024  4:28 PM Angie Leo Add [R07.9] Chest pain, unspecified           ED Disposition       ED Disposition   Discharge    Condition   Stable    Date/Time   Tue Jan 30, 2024 1628    Comment   Michelle Gutierrez discharge to home/self care.                   Follow-up Information       Follow up With Specialties Details Why Contact Info Additional Information    [facility] Emergency Medicine Go to  If symptoms  worsen, otherwise please follow up with your family doctor and cardiologist [address] [place] [address] [id number] [phone] [facility], [facility] Emergency Department, [address], [place], [address]            Patient's Medications   Discharge Prescriptions    No medications on file       No discharge procedures on file.    PDMP Review       None            ED Provider  Electronically Signed by             Angie Leo PA-C  01/30/24 3028

## 2024-02-01 LAB
ATRIAL RATE: 87 BPM
P AXIS: 48 DEGREES
PR INTERVAL: 138 MS
QRS AXIS: -2 DEGREES
QRSD INTERVAL: 84 MS
QT INTERVAL: 366 MS
QTC INTERVAL: 440 MS
T WAVE AXIS: 24 DEGREES
VENTRICULAR RATE: 87 BPM

## 2024-02-12 ENCOUNTER — OFFICE VISIT (OUTPATIENT)
Dept: CARDIOLOGY CLINIC | Facility: CLINIC | Age: 59
End: 2024-02-12
Payer: COMMERCIAL

## 2024-02-12 VITALS
WEIGHT: 144.4 LBS | DIASTOLIC BLOOD PRESSURE: 89 MMHG | OXYGEN SATURATION: 99 % | BODY MASS INDEX: 23.21 KG/M2 | HEIGHT: 66 IN | SYSTOLIC BLOOD PRESSURE: 139 MMHG | HEART RATE: 90 BPM

## 2024-02-12 DIAGNOSIS — R79.89 POSITIVE D DIMER: ICD-10-CM

## 2024-02-12 DIAGNOSIS — E03.9 HYPOTHYROIDISM, UNSPECIFIED TYPE: Primary | ICD-10-CM

## 2024-02-12 DIAGNOSIS — R06.09 DOE (DYSPNEA ON EXERTION): ICD-10-CM

## 2024-02-12 DIAGNOSIS — R00.2 PALPITATIONS: Primary | ICD-10-CM

## 2024-02-12 PROCEDURE — 99214 OFFICE O/P EST MOD 30 MIN: CPT | Performed by: NURSE PRACTITIONER

## 2024-02-12 NOTE — LETTER
2024     Jade Everett MD   [facility]  [facility] [address]    Patient: Michelle Gutierrez   YOB: 1965   Date of Visit: 2024       Dear Dr. Everett:    Thank you for referring Michelle Gutierrez to me for evaluation. Below are my notes for this consultation.    If you have questions, please do not hesitate to call me. I look forward to following your patient along with you.         Sincerely,        [facility]        CC: No Recipients    [facility]  2024  4:55 PM  Sign when Signing Visit  Cardiology  Acute   Office Visit Note  Michelle Gutierrez   58 y.o.   female   MRN: [id number]  [facility] [place]  [address] [facility] [address]    [place] [address]  [phone]    PCP: Jade Everett MD  Cardiologist: Dr. Rodriguez             Summary of recommendations  -I encouraged hydration as she has been.  She has been consuming about 60 to 70 ounces of fluid a day  -She is avoiding cardiac stimulants  -2-week Zio patch  -Refer to hematology regarding a chronically elevated D-dimer.  Whenever she goes to the ED and is determined she has an elevated D-dimer, she then is subject to further CAT scans.  She has not had an evaluation thus far with a hematologist  -Follow up will be scheduled with Dr Rodriguez in 3 m [facility]  Colon Ca screenin2019 , up-to-date        Assessment/plan  Palpitations, occurring daily with shortness of breath.  This may be related to deconditioning.  However, will pursue a 2-week event recorder  Chronically elevated [id number] to hematology  Hyperlipidemia.  Heart healthy diet, regular exercise.  Her ASCVD risk score is 2.4%.   Latest Reference Range & Units 22 09:55 23 09:56   Cholesterol <200 mg/dL 220 (H) 229 (H)   Triglycerides <150 mg/dL 70 99   HDL > OR = 50 mg/dL 61 59   Non-HDL Cholesterol <130 mg/dL (calc) 159 (H) 170 (H)   LDL Calculated mg/dL (calc) 143 (H) 149 (H)   Chol HDLC Ratio <5.0 (calc) 3.6  3.9   Hypothyroidism/thyroid nodules.  She follows with endocrinology.  Maintained on levothyroxine  DARON,mild obstructive sleep apnea.  Diagnostic sleep study 12/20/2021. follows with pulmonology  Hx GERD with dysmotility.  She follows with GI.  COVID-19 infection late 2020, also August 2022.  During the latter, she was given Paxlovid.  She had some respiratory related issues,ie.SOB.  She follows with pulmonology.  She underwent pulmonary rehab with resolved sx  Cardiac testing  Holter, 12/2015:  rare PACs, atrial tachycardia of 6 beats, and PVCs   TTE 04/01/2015 EF 53%, possible mild anteroseptal hypokinesia  Stress test 04/01/2015:  EKG was negative for ischemia  TTE, 06/16/2021:EF 55%,Trace MR, mild MR  Nuclear stress test, 06/16/2021 normal  48 hr Holter 6/2021  1. 48 hour holter tracing report without any significant abnormalities or arrhythmia  2. Rhythm was sinus throughout monitoring period.  3. There were very rare episodes of Supraventricular ectopic activity.  4. There were very rare episodes of Ventricular ectopic activity.  5.  There were no episodes of SVT, PSVT,  NSVT, VT, atrial fibrillation or flutter  5. Patient's symptoms of heart fluttering, lightheadedness and dizziness corresponded to  Sinus rhythm and mild sinus tachycardia on monitor.                      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  She is a non-smoker.  She has a history of Hashimoto's thyroiditis, migraines, GERD with dysmotility, and obstructive sleep apnea.  She had COVID in 2020, along with intermittent dyspnea and palpitations since.  She had a Holter monitor that was relatively normal.  She also underwent a nuclear stress test and echocardiogram that were normal.    She initially followed with cardiologist Dr. Mohan in [place], more recently Dr. Rodriguez.  She was last seen May 2022    She complained of intermittent dizziness sometimes when standing.  She has had multiple ER visits and evaluations.   She has had evaluation by both cardiology, pulmonology, GI and ENT..  She underwent CTA of the chest 5/16/22 that was unrevealing.  She underwent pulmonary rehab.  She also has a history of rare PACs noted on Holter monitor.  Her lightheadedness was suspected to be related to her hydration status.  Hydration recommended.  She was advised to follow-up with cardiology as needed      1/30/2024 ED adm.  [facility]  Chest pain,SOB and intermittent palpitations, beginning 10 days prior, after shoveling snow.  She was also seen at urgent care a few days prior, and had an EKG  EKG: Normal sinus rhythm nonspecific T T wave changes laterally  CBC, CMP, TSH, BMP all within normal limits  /73  HS  troponin less than 2  D-dimer elevated at 2.83  CTA chest: No PE.  Positive cholelithiasis.  No evidence of cholecystitis    2/12/24  ED follow-up  She denies chest pain.  She has episodic episodes of palpitations with concurrent dyspnea.  She does not currently regularly exercise.  In the past she did go to pulmonary rehab with resolved symptoms.  However then her life became very busy, she started working some extra hours [employer] and was not able to exercise.  She works as a pediatric nurse.  She hydrates with about 70 ounces of fluid a day  She drinks 1 cup of tea a day she does not use energy drinks nor consume alcohol and any excess   ED evaluation reviewed  I reviewed her most recent EKG, with the patient  At this point I will order a 2-week event recorder  I will also refer her to hematology given a chronically elevated D-dimer.  Of note, whenever she goes to the emergency room and someone draws a D-dimer it is elevated and then she subsequently requires additional testing with CT scans.        Assessment  Diagnoses and all orders for this visit:    Palpitations  -     AMB extended holter monitor; Future    Positive D dimer  -     Ambulatory Referral to Hematology / Oncology; Future    GRIGGS (dyspnea on  exertion)          Past Medical History:   Diagnosis Date   • Allergic rhinitis     Resolved 8/23/2017    • Cervical neuritis    • Contact lens/glasses fitting    • COVID-19     12/20   • Disease of thyroid gland    • Dysphagia     mild stasis, dysmotility   • Hashimoto's thyroiditis    • Migraine 08/24/2020    Daily headaches   • Miscarriage 1996   • Primary localized osteoarthritis of right knee 07/31/2018   • Sleep apnea     mild   • Varicella     As a child   • Vitamin D deficiency        ROS    No Known Allergies  .    Current Outpatient Medications:   •  Calcium-Cholecalciferol-Zinc (Viactiv Calcium Immune) 650-20-5.5 MG-MCG-MG CHEW, , Disp: , Rfl:   •  cetirizine (ZyrTEC) 10 MG chewable tablet, Chew 10 mg daily, Disp: , Rfl:   •  Cholecalciferol 25 MCG (1000 UT) tablet, Take 1,000 Units by mouth daily, Disp: , Rfl:   •  fluticasone (FLONASE) 50 mcg/act nasal spray, 1 spray into each nostril daily, Disp: , Rfl:   •  levothyroxine 75 mcg tablet, Take 75 mcg by mouth daily, Disp: , Rfl:   •  omeprazole (PriLOSEC) 20 mg delayed release capsule, Take 1 capsule (20 mg total) by mouth every morning 30 min before breakfast, Disp: 90 capsule, Rfl: 3        Social History     Socioeconomic History   • Marital status: /Civil Union     Spouse name: Not on file   • Number of children: 2   • Years of education: Not on file   • Highest education level: Bachelor's degree (e.g., BA, AB, BS)   Occupational History   • Occupation: RN, weekends    Tobacco Use   • Smoking status: Never   • Smokeless tobacco: Never   Vaping Use   • Vaping status: Never Used   Substance and Sexual Activity   • Alcohol use: No   • Drug use: No   • Sexual activity: Not Currently     Partners: Male     Birth control/protection: Post-menopausal     Comment: not sexually active and acceptable to both   Other Topics Concern   • Not on file   Social History Narrative    Daily cola consumption (__cans/day)    Daily tea consumption (1 cup/day)     "Does not exercise     x25 years     Social Determinants of Health     Financial Resource Strain: Not on file   Food Insecurity: Not on file   Transportation Needs: Not on file   Physical Activity: Not on file   Stress: Not on file   Social Connections: Not on file   Intimate Partner Violence: Not on file   Housing Stability: Not on file       Family History   Problem Relation Age of Onset   • Hypertension Mother    • Hyperlipidemia Mother    • Osteoarthritis Mother    • Thyroid disease Mother    • Hyperthyroidism Mother    • Asthma Mother    • Coronary artery disease Mother    • Heart failure Mother          from heart failure after back surgery 10/21   • Heart attack Father    • Heart failure Father          from heart attack  at age 76   • Heart disease Father          from heart attack at age 76   • No Known Problems Sister    • No Known Problems Daughter    • No Known Problems Brother    • Heart block Son    • No Known Problems Maternal Aunt    • No Known Problems Maternal Aunt    • No Known Problems Maternal Aunt    • No Known Problems Maternal Aunt    • Arthritis Family    • Osteoporosis Family        Physical Exam    Vitals: Blood pressure 139/89, pulse 90, height 5' 6\" (1.676 m), weight 65.5 kg (144 lb 6.4 oz), SpO2 99%.   Wt Readings from Last 3 Encounters:   24 65.5 kg (144 lb 6.4 oz)   24 65.1 kg (143 lb 9.6 oz)   23 64.9 kg (143 lb)         Labs & Results:  Lab Results   Component Value Date    WBC 8.63 2024    HGB 15.1 2024    HCT 45.1 2024    MCV 94 2024     2024     BNP   Date Value Ref Range Status   2024 38 0 - 100 pg/mL Final   2022 28 0 - 100 pg/mL Final     No components found for: \"CHEM\"  Troponin I   Date Value Ref Range Status   2021 <0.02 <=0.04 ng/mL Final     Comment:     Autovalidation override  [employer] Chemistry analyzer 99% cutoff is > 0.04 ng/mL in network labs     o cTnI 99% cutoff is useful " only when applied to patients in the clinical setting of myocardial ischemia   o cTnI 99% cutoff should be interpreted in the context of clinical history, ECG findings and possibly cardiac imaging to establish correct diagnosis.   o cTnI 99% cutoff may be suggestive but clearly not indicative of a coronary event without the clinical setting of myocardial ischemia.       Results for orders placed during the hospital encounter of 21    Echo complete with contrast if indicated    Narrative  [address] [id number] [phone]    Transthoracic Echocardiogram  2D, M-mode, Doppler, and Color Doppler    Study date:  2021    Patient: HELADIO CERRATO  MR number: [id number]  Account number: [id number]  : 1965  Age: 56 years  Gender: Female  Status: Outpatient  Location: Echo lab  Height: 66 in  Weight: 144.8 lb  BP: 122/ 70 mmHg    Indications: Palpitations.    Diagnoses: R00.2 - Palpitations    Sonographer:  ТАТЬЯНА Martin  Primary Physician:  Jade Everett MD  Referring Physician:  Santi Mohan MD  Group:  [facility]  Interpreting Physician:  Tesfaye Bryson MD    SUMMARY    SUMMARY:  Compared to prior echo, normal LV size and systolic function. No major wall motion abnormalities are noted.    LEFT VENTRICLE:  Systolic function was at the lower limits of normal. Ejection fraction was estimated in the range of 55 % to 60 % to be 55 %.  There were no regional wall motion abnormalities.    MITRAL VALVE:  There was trace regurgitation.    PULMONIC VALVE:  There was mild regurgitation.    HISTORY: PRIOR HISTORY: Hashimoto's, Covid-19    PROCEDURE: The procedure was performed in the echo lab. This was a routine study. The transthoracic approach was used. The study included complete 2D imaging, M-mode, complete spectral Doppler, and color Doppler. The heart rate was 77 bpm,  at the start of the study.    LEFT VENTRICLE: Size was  normal. Systolic function was at the lower limits of normal. Ejection fraction was estimated in the range of 55 % to 60 % to be 55 %. There were no regional wall motion abnormalities. Wall thickness was normal. No  evidence of apical thrombus. DOPPLER: Left ventricular diastolic function parameters were normal.    RIGHT VENTRICLE: The size was normal. Systolic function was normal with TAPSE-2.2cm Wall thickness was normal.    LEFT ATRIUM: Size was normal.    RIGHT ATRIUM: Size was normal.    MITRAL VALVE: There was mild thickening. There was normal leaflet separation. DOPPLER: The transmitral velocity was within the normal range. There was no evidence for stenosis. There was trace regurgitation.    AORTIC VALVE: The valve was probably trileaflet. Leaflets exhibited mildly increased thickness and normal cuspal separation. DOPPLER: Transaortic velocity was within the normal range. There was no evidence for stenosis. There was no  significant regurgitation.    TRICUSPID VALVE: The valve structure was normal. There was normal leaflet separation. DOPPLER: The transtricuspid velocity was within the normal range. There was no evidence for stenosis. There was no significant regurgitation.    PULMONIC VALVE: Leaflets exhibited normal thickness, no calcification, and normal cuspal separation. DOPPLER: The transpulmonic velocity was within the normal range. There was mild regurgitation.    PERICARDIUM: There was no pericardial effusion. The pericardium was normal in appearance.    AORTA: The root exhibited normal size.    SYSTEMIC VEINS: IVC: The inferior vena cava was normal in size.    SYSTEM MEASUREMENT TABLES    2D  EF (Teich): 54.75 %  %FS: 28.03 %  Ao Diam: 2.84 cm  EDV(Teich): 72.85 ml  ESV(Teich): 32.97 ml  IVSd: 0.76 cm  LA Area: 10.74 cm2  LA Diam: 2.77 cm  LVEDV MOD A4C: 62.8 ml  LVEF MOD A4C: 68.71 %  LVESV MOD A4C: 19.65 ml  LVIDd: 4.07 cm  LVIDs: 2.93 cm  LVLd A4C: 7.86 cm  LVLs A4C: 6.55 cm  LVPWd: 0.85 cm  RA  Area: 11.73 cm2  RVIDd: 2.25 cm  SV (Teich): 39.88 ml  SV MOD A4C: 43.15 ml    CW  AV Env.Ti: 266.41 ms  AV VTI: 22.14 cm  AV Vmax: 1.24 m/s  AV Vmean: 0.83 m/s  AV maxP.19 mmHg  AV meanPG: 3.06 mmHg    MM  TAPSE: 2.24 cm    PW  MV E/A Ratio: 1.58  E' Sept: 0.13 m/s  E/E' Sept: 7.57  LVOT Env.Ti: 285.44 ms  LVOT VTI: 22.01 cm  LVOT Vmax: 1.12 m/s  LVOT Vmean: 0.77 m/s  LVOT maxP.02 mmHg  LVOT meanP.7 mmHg  MV A Sánchez: 0.64 m/s  MV Dec [place]: 5.2 m/s2  MV DecT: 195.58 ms  MV E Sánchez: 1.02 m/s  MV PHT: 56.72 ms  MVA By PHT: 3.88 cm2    [facility] Accredited Echocardiography Laboratory    Prepared and electronically signed by    Tesfaye Bryson MD  Signed 2021 10:53:07    No results found for this or any previous visit.      This note was completed in part utilizing [employer] direct voice recognition software.   Grammatical errors, random word insertion, spelling mistakes, and incomplete sentences may be an occasional consequence of the system secondary to software limitations, ambient noise and hardware issues. At the time of dictation, efforts were made to edit, clarify and /or correct errors.  Please read the chart carefully and recognize, using context, where substitutions have occurred.  If you have any questions or concerns about the context, text or information contained within the body of this dictation, please contact myself, the provider, for further clarification

## 2024-02-12 NOTE — PROGRESS NOTES
Cardiology  Acute   Office Visit Note  Michelle Gutierrez   58 y.o.   female   MRN: [id number]  [facility]  [address]    [place] [address] [phone]    PCP: Jade Everett MD  Cardiologist: Dr. Rodriguez             Summary of recommendations  -I encouraged hydration as she has been.  She has been consuming about 60 to 70 ounces of fluid a day  -She is avoiding cardiac stimulants  -2-week Zio patch  -Refer to hematology regarding a chronically elevated D-dimer.  Whenever she goes to the ED and is determined she has an elevated D-dimer, she then is subject to further CAT scans.  She has not had an evaluation thus far with a hematologist  -Follow up will be scheduled with Dr Rodriguez in 3 m [facility]  Colon Ca screenin2019 , up-to-date        Assessment/plan  Palpitations, occurring daily with shortness of breath.  This may be related to deconditioning.  However, will pursue a 2-week event recorder  Chronically elevated I-hzand-solka to hematology  Hyperlipidemia.  Heart healthy diet, regular exercise.  Her ASCVD risk score is 2.4%.   Latest Reference Range & Units 22 09:55 23 09:56   Cholesterol <200 mg/dL 220 (H) 229 (H)   Triglycerides <150 mg/dL 70 99   HDL > OR = 50 mg/dL 61 59   Non-HDL Cholesterol <130 mg/dL (calc) 159 (H) 170 (H)   LDL Calculated mg/dL (calc) 143 (H) 149 (H)   Chol HDLC Ratio <5.0 (calc) 3.6 3.9   Hypothyroidism/thyroid nodules.  She follows with endocrinology.  Maintained on levothyroxine  DARON,mild obstructive sleep apnea.  Diagnostic sleep study 2021. follows with pulmonology  Hx GERD with dysmotility.  She follows with GI.  COVID-19 infection late , also 2022.  During the latter, she was given Paxlovid.  She had some respiratory related issues,ie.SOB.  She follows with pulmonology.  She underwent pulmonary rehab with resolved sx  Cardiac testing  Holter, 2015:  rare PACs, atrial tachycardia of 6 beats, and  PVCs   TTE 04/01/2015 EF 53%, possible mild anteroseptal hypokinesia  Stress test 04/01/2015:  EKG was negative for ischemia  TTE, 06/16/2021:EF 55%,Trace MR, mild MR  Nuclear stress test, 06/16/2021 normal  48 hr Holter 6/2021  1. 48 hour holter tracing report without any significant abnormalities or arrhythmia  2. Rhythm was sinus throughout monitoring period.  3. There were very rare episodes of Supraventricular ectopic activity.  4. There were very rare episodes of Ventricular ectopic activity.  5.  There were no episodes of SVT, PSVT,  NSVT, VT, atrial fibrillation or flutter  5. Patient's symptoms of heart fluttering, lightheadedness and dizziness corresponded to  Sinus rhythm and mild sinus tachycardia on monitor.                      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  She is a non-smoker.  She has a history of Hashimoto's thyroiditis, migraines, GERD with dysmotility, and obstructive sleep apnea.  She had COVID in 2020, along with intermittent dyspnea and palpitations since.  She had a Holter monitor that was relatively normal.  She also underwent a nuclear stress test and echocardiogram that were normal.    She initially followed with cardiologist Dr. Mohan in [place], more recently Dr. Rodriguez.  She was last seen May 2022    She complained of intermittent dizziness sometimes when standing.  She has had multiple ER visits and evaluations.  She has had evaluation by both cardiology, pulmonology, GI and ENT..  She underwent CTA of the chest 5/16/22 that was unrevealing.  She underwent pulmonary rehab.  She also has a history of rare PACs noted on Holter monitor.  Her lightheadedness was suspected to be related to her hydration status.  Hydration recommended.  She was advised to follow-up with cardiology as needed      [facility]  Chest pain,SOB and intermittent palpitations, beginning 10 days prior, after shoveling snow.  She was also seen at urgent care  a few days prior, and had an EKG  EKG: Normal sinus rhythm nonspecific T T wave changes laterally  CBC, CMP, TSH, BMP all within normal limits  /73  HS  troponin less than 2  D-dimer elevated at 2.83  CTA chest: No PE.  Positive cholelithiasis.  No evidence of cholecystitis    2/12/24  ED follow-up  She denies chest pain.  She has episodic episodes of palpitations with concurrent dyspnea.  She does not currently regularly exercise.  In the past she did go to pulmonary rehab with resolved symptoms.  However then her life became very busy, she started working some extra hours [employer] and was not able to exercise.  She works as a pediatric nurse.  She hydrates with about 70 ounces of fluid a day  She drinks 1 cup of tea a day she does not use energy drinks nor consume alcohol and any excess   ED evaluation reviewed  I reviewed her most recent EKG, with the patient  At this point I will order a 2-week event recorder  I will also refer her to hematology given a chronically elevated D-dimer.  Of note, whenever she goes to the emergency room and someone draws a D-dimer it is elevated and then she subsequently requires additional testing with CT scans.        Assessment  Diagnoses and all orders for this visit:    Palpitations  -     AMB extended holter monitor; Future    Positive D dimer  -     Ambulatory Referral to Hematology / Oncology; Future    GRIGGS (dyspnea on exertion)          Past Medical History:   Diagnosis Date    Allergic rhinitis     Resolved 8/23/2017     Cervical neuritis     Contact lens/glasses fitting     COVID-19     12/20    Disease of thyroid gland     Dysphagia     mild stasis, dysmotility    Hashimoto's thyroiditis     Migraine 08/24/2020    Daily headaches    Miscarriage 1996    Primary localized osteoarthritis of right knee 07/31/2018    Sleep apnea     mild    Varicella     As a child    Vitamin D deficiency        ROS    No Known Allergies  .    Current Outpatient Medications:      Calcium-Cholecalciferol-Zinc (Viactiv Calcium Immune) 650-20-5.5 MG-MCG-MG CHEW, , Disp: , Rfl:     cetirizine (ZyrTEC) 10 MG chewable tablet, Chew 10 mg daily, Disp: , Rfl:     Cholecalciferol 25 MCG (1000 UT) tablet, Take 1,000 Units by mouth daily, Disp: , Rfl:     fluticasone (FLONASE) 50 mcg/act nasal spray, 1 spray into each nostril daily, Disp: , Rfl:     levothyroxine 75 mcg tablet, Take 75 mcg by mouth daily, Disp: , Rfl:     omeprazole (PriLOSEC) 20 mg delayed release capsule, Take 1 capsule (20 mg total) by mouth every morning 30 min before breakfast, Disp: 90 capsule, Rfl: 3        Social History     Socioeconomic History    Marital status: /Civil Union     Spouse name: Not on file    Number of children: 2    Years of education: Not on file    Highest education level: Bachelor's degree (e.g., BA, AB, BS)   Occupational History    Occupation: RN, weekends    Tobacco Use    Smoking status: Never    Smokeless tobacco: Never   Vaping Use    Vaping status: Never Used   Substance and Sexual Activity    Alcohol use: No    Drug use: No    Sexual activity: Not Currently     Partners: Male     Birth control/protection: Post-menopausal     Comment: not sexually active and acceptable to both   Other Topics Concern    Not on file   Social History Narrative    Daily cola consumption (__cans/day)    Daily tea consumption (1 cup/day)    Does not exercise     x25 years     Social Determinants of Health     Financial Resource Strain: Not on file   Food Insecurity: Not on file   Transportation Needs: Not on file   Physical Activity: Not on file   Stress: Not on file   Social Connections: Not on file   Intimate Partner Violence: Not on file   Housing Stability: Not on file       Family History   Problem Relation Age of Onset    Hypertension Mother     Hyperlipidemia Mother     Osteoarthritis Mother     Thyroid disease Mother     Hyperthyroidism Mother     Asthma Mother     Coronary artery disease Mother      "Heart failure Mother          from heart failure after back surgery 10/21    Heart attack Father     Heart failure Father          from heart attack  at age 76    Heart disease Father          from heart attack at age 76    No Known Problems Sister     No Known Problems Daughter     No Known Problems Brother     Heart block Son     No Known Problems Maternal Aunt     No Known Problems Maternal Aunt     No Known Problems Maternal Aunt     No Known Problems Maternal Aunt     Arthritis Family     Osteoporosis Family        Physical Exam    Vitals: Blood pressure 139/89, pulse 90, height 5' 6\" (1.676 m), weight 65.5 kg (144 lb 6.4 oz), SpO2 99%.   Wt Readings from Last 3 Encounters:   24 65.5 kg (144 lb 6.4 oz)   24 65.1 kg (143 lb 9.6 oz)   23 64.9 kg (143 lb)         Labs & Results:  Lab Results   Component Value Date    WBC 8.63 2024    HGB 15.1 2024    HCT 45.1 2024    MCV 94 2024     2024     BNP   Date Value Ref Range Status   2024 38 0 - 100 pg/mL Final   2022 28 0 - 100 pg/mL Final     No components found for: \"CHEM\"  Troponin I   Date Value Ref Range Status   2021 <0.02 <=0.04 ng/mL Final     Comment:     Autovalidation override  [employer] Chemistry analyzer 99% cutoff is > 0.04 ng/mL in network labs     o cTnI 99% cutoff is useful only when applied to patients in the clinical setting of myocardial ischemia   o cTnI 99% cutoff should be interpreted in the context of clinical history, ECG findings and possibly cardiac imaging to establish correct diagnosis.   o cTnI 99% cutoff may be suggestive but clearly not indicative of a coronary event without the clinical setting of myocardial ischemia.       Results for orders placed during the hospital encounter of 21    Echo complete with contrast if indicated    Narrative  [address] [id number] [phone]    Transthoracic " Echocardiogram  2D, M-mode, Doppler, and Color Doppler    Study date:  2021    Patient: HELADIO CERRATO  MR number: [id number]  Account number: [id number]  : 1965  Age: 56 years  Gender: Female  Status: Outpatient  Location: Echo lab  Height: 66 in  Weight: 144.8 lb  BP: 122/ 70 mmHg    Indications: Palpitations.    Diagnoses: R00.2 - Palpitations    Sonographer:  ТАТЬЯНА Martin  Primary Physician:  Jade Everett MD  Referring Physician:  Santi Mohan MD  Group:  [facility]  Interpreting Physician:  Tesfaye Bryson MD    SUMMARY    SUMMARY:  Compared to prior echo, normal LV size and systolic function. No major wall motion abnormalities are noted.    LEFT VENTRICLE:  Systolic function was at the lower limits of normal. Ejection fraction was estimated in the range of 55 % to 60 % to be 55 %.  There were no regional wall motion abnormalities.    MITRAL VALVE:  There was trace regurgitation.    PULMONIC VALVE:  There was mild regurgitation.    HISTORY: PRIOR HISTORY: Hashimoto's, Covid-19    PROCEDURE: The procedure was performed in the echo lab. This was a routine study. The transthoracic approach was used. The study included complete 2D imaging, M-mode, complete spectral Doppler, and color Doppler. The heart rate was 77 bpm,  at the start of the study.    LEFT VENTRICLE: Size was normal. Systolic function was at the lower limits of normal. Ejection fraction was estimated in the range of 55 % to 60 % to be 55 %. There were no regional wall motion abnormalities. Wall thickness was normal. No  evidence of apical thrombus. DOPPLER: Left ventricular diastolic function parameters were normal.    RIGHT VENTRICLE: The size was normal. Systolic function was normal with TAPSE-2.2cm Wall thickness was normal.    LEFT ATRIUM: Size was normal.    RIGHT ATRIUM: Size was normal.    MITRAL VALVE: There was mild thickening. There was normal leaflet separation. DOPPLER: The transmitral velocity  was within the normal range. There was no evidence for stenosis. There was trace regurgitation.    AORTIC VALVE: The valve was probably trileaflet. Leaflets exhibited mildly increased thickness and normal cuspal separation. DOPPLER: Transaortic velocity was within the normal range. There was no evidence for stenosis. There was no  significant regurgitation.    TRICUSPID VALVE: The valve structure was normal. There was normal leaflet separation. DOPPLER: The transtricuspid velocity was within the normal range. There was no evidence for stenosis. There was no significant regurgitation.    PULMONIC VALVE: Leaflets exhibited normal thickness, no calcification, and normal cuspal separation. DOPPLER: The transpulmonic velocity was within the normal range. There was mild regurgitation.    PERICARDIUM: There was no pericardial effusion. The pericardium was normal in appearance.    AORTA: The root exhibited normal size.    SYSTEMIC VEINS: IVC: The inferior vena cava was normal in size.    SYSTEM MEASUREMENT TABLES    2D  EF (Teich): 54.75 %  %FS: 28.03 %  Ao Diam: 2.84 cm  EDV(Teich): 72.85 ml  ESV(Teich): 32.97 ml  IVSd: 0.76 cm  LA Area: 10.74 cm2  LA Diam: 2.77 cm  LVEDV MOD A4C: 62.8 ml  LVEF MOD A4C: 68.71 %  LVESV MOD A4C: 19.65 ml  LVIDd: 4.07 cm  LVIDs: 2.93 cm  LVLd A4C: 7.86 cm  LVLs A4C: 6.55 cm  LVPWd: 0.85 cm  RA Area: 11.73 cm2  RVIDd: 2.25 cm  SV (Teich): 39.88 ml  SV MOD A4C: 43.15 ml    CW  AV Env.Ti: 266.41 ms  AV VTI: 22.14 cm  AV Vmax: 1.24 m/s  AV Vmean: 0.83 m/s  AV maxP.19 mmHg  AV meanPG: 3.06 mmHg    MM  TAPSE: 2.24 cm    PW  MV E/A Ratio: 1.58  E' Sept: 0.13 m/s  E/E' Sept: 7.57  LVOT Env.Ti: 285.44 ms  LVOT VTI: 22.01 cm  LVOT Vmax: 1.12 m/s  LVOT Vmean: 0.77 m/s  LVOT maxP.02 mmHg  LVOT meanP.7 mmHg  MV A Sánchez: 0.64 m/s  MV Dec [place]: 5.2 m/s2  MV DecT: 195.58 ms  MV E Sánchez: 1.02 m/s  MV PHT: 56.72 ms  MVA By PHT: 3.88 cm2    [facility]    Prepared and electronically signed by    Tesfaye Bryson MD  Signed 17-Jun-2021 10:53:07    No results found for this or any previous visit.      This note was completed in part utilizing [employer] direct voice recognition software.   Grammatical errors, random word insertion, spelling mistakes, and incomplete sentences may be an occasional consequence of the system secondary to software limitations, ambient noise and hardware issues. At the time of dictation, efforts were made to edit, clarify and /or correct errors.  Please read the chart carefully and recognize, using context, where substitutions have occurred.  If you have any questions or concerns about the context, text or information contained within the body of this dictation, please contact myself, the provider, for further clarification

## 2024-02-13 DIAGNOSIS — E03.9 HYPOTHYROIDISM, UNSPECIFIED TYPE: Primary | ICD-10-CM

## 2024-02-13 RX ORDER — LEVOTHYROXINE SODIUM 0.07 MG/1
75 TABLET ORAL DAILY
Qty: 90 TABLET | Refills: 1 | Status: SHIPPED | OUTPATIENT
Start: 2024-02-13

## 2024-02-14 ENCOUNTER — TELEPHONE (OUTPATIENT)
Dept: HEMATOLOGY ONCOLOGY | Facility: CLINIC | Age: 59
End: 2024-02-14

## 2024-02-14 NOTE — TELEPHONE ENCOUNTER
CRITICAL CARE ADMISSION NOTE:

 

DATE OF ADMISSION:

 

HISTORY OF PRESENT ILLNESS:  The patient is a 29-year-old male who presented to 
emergency department with acute onset of right-sided weakness and paraesthesia.
  He is seen by Neurology and tPA is administered.  ICU consultation is then 
requested for further workup and management.  On my arrival at bedside in the 
emergency department, he is awake and alert, reports that he has resolution of 
his symptoms. He had absolutely no complaints and reported that they had abated 
on his second CAT scan apparently while he was having the CTA performed.  The 
tPA was actively infusing at that time.  On my exam, I am able to detect a 
little bit of right-sided facial droop, but beyond that, he does seem to be at 
or near his baseline.  He denies any headache, denies any bleeding diathesis.  
He does report his recent testicular surgery for testicular cancer.  This was 
initially diagnosed in August of 2019.  It has been complicated by some local 
infection for which he is on antibiotics.

 

PAST MEDICAL HISTORY:  Remarkable for hypertension, reactive airway disease, it 
seems to be some mix of COPD and asthma, though I do not have a clear diagnosis 
on looking backwards from his medications.  He has some cognitive delay but was 
entirely appropriate and nondetectable for any delay during my interview with 
him in the emergency department.  He is seen with his mother at the bedside.

 

PAST SURGICAL HISTORY:  Remarkable for orchiectomy just this past month.

 

MEDICATIONS:  Medication list includes:

1.  Acetaminophen 650 mg q.4 hours p.r.n.

2.  Advair Diskus 100/50 one puff b.i.d.

3.  Amlodipine 5 mg daily.

4.  Aspirin 81 mg daily.

5.  Irbesartan/hydrochlorothiazide 300/12.5 one tablet daily.

6.  Proventil HFA q.4 hours p.r.n.

 

ALLERGIES:  No known drug allergies.

 

FAMILY HISTORY:  Not remarkable for early CAD, stroke or clotting disorders.

 

SOCIAL HISTORY:  He is single.  Lives in a supported house.  He is a nonsmoker. 
Generally fairly independent.

 

REVIEW OF SYSTEMS:  As detailed in HPI, additionally denies history of focal 
motor weakness, visual change, headache or neck stiffness.  Denies nausea, 
vomiting, or diarrhea.  Denies bladder or bowel complaints.  Denies productive 
cough, fever, or chills.  Reports compliance with the medication regimen.  
Denies any psychiatric history.

 

                               PHYSICAL EXAMINATION

 

GENERAL:  He is a well-developed -American male in no acute distress.

 

VITAL SIGNS:  He presents with a temperature of 36.4 degrees, heart rate of 110
, respiratory rate of 16, he is saturating 96% on room air, blood pressure of 90
/40.

 

HEENT:  He is normocephalic, atraumatic.  Pupils equal, reactive to light.

 

NECK:  Supple, nontender without JVD.

 

LUNGS:  His lungs are clear bilaterally.

 

HEART:  S1, S2 regular.  I occasionally thought I heard 1/6 systolic murmur.

 

ABDOMEN:  Soft, mildly obese, nontender, nondistended, positive bowel sounds.

 

:  I deferred the penile exam in that setting.  He denies any recent bleeding.

 

EXTREMITIES:  Lower extremities without edema.

 

NEUROLOGIC:  I felt his power was 5/5 times all 4 extremities.  He reported 
sensation intact.  I had his NIHSS at 1 for very mild right-sided droop, best 
seen as a partial smile.

 

 DIAGNOSTIC STUDIES/LAB DATA:  White blood cell count of 2.3, hemoglobin of 13.5
, and platelet count of 110.  White count is consistent with prior.  He has 
been undergoing chemotherapy.  Serum sodium 135, potassium 3.8, chloride 98, 
bicarb 31, BUN/creatinine 10/1.1 with the glucose of 113.  LFTs grossly normal.
  Slightly elevated alk phos of 109.  LDL is 108.

 

ASSESSMENT AND PLAN:  This is a 29-year-old male presents with clinical 
diagnosis of cerebrovascular accident, seen by Neurology, he is given tPA, now 
admitted to the ICU for further workup and management.  I will discuss with 
Neurology about target blood pressure to see whether we need to place him on 
some tara-synephrine given that his presentation blood pressure was quite so low 
and was better when I saw him at the bedside in the ED, but not up to the usual 
160 to 180 target.  He clearly needs a statin while the cholesterol is 
reasonable. At 163, LDL of 108, now for secondary prevention would certainly be 
above the goal.  We will also check a hemoglobin A1c to make sure there is no 
latent diabetes.  He has a slightly elevated blood sugar at this time.  We will 
continue the balance of stroke workup, swallow evaluation.  I have placed him 
on ceftriaxone to assure he gets adequate antibiotics.  He is still on 
amoxicillin/clavulanate on the outpatient side and not knowing precisely when 
he will get his swallow evaluation, I do not want to miss any doses of his 
antibiotic.  We will continue his bronchodilators and hold his 
antihypertensives.

 

TIME SPENT:  The aggregate time providing critical care as well as personally 
interpreting multiple laboratory evaluations, imaging studies, reviewing the 
medical record and discussing the case with other physicians has thus far 
exceeded 40 minutes.

 

 

 

[id number]/CPS #: [id number]

MTDD I called Michelle in response to a referral that was received for patient to establish care with Hematology.     Outreach was made to schedule a consultation.    A consultation was scheduled for patient during this call. Patient is scheduled on 3/26/24 at 10:00am with Dr Tsai at the [facility]      Patient declined sooner appointment due to her work schedule.

## 2024-02-21 ENCOUNTER — ANNUAL EXAM (OUTPATIENT)
Dept: OBGYN CLINIC | Facility: CLINIC | Age: 59
End: 2024-02-21
Payer: COMMERCIAL

## 2024-02-21 VITALS
SYSTOLIC BLOOD PRESSURE: 124 MMHG | DIASTOLIC BLOOD PRESSURE: 78 MMHG | HEIGHT: 66 IN | WEIGHT: 143 LBS | BODY MASS INDEX: 22.98 KG/M2

## 2024-02-21 DIAGNOSIS — R23.2 HOT FLASHES: ICD-10-CM

## 2024-02-21 DIAGNOSIS — Z01.419 WOMEN'S ANNUAL ROUTINE GYNECOLOGICAL EXAMINATION: Primary | ICD-10-CM

## 2024-02-21 DIAGNOSIS — N95.2 POSTMENOPAUSAL ATROPHIC VAGINITIS: ICD-10-CM

## 2024-02-21 PROCEDURE — S0612 ANNUAL GYNECOLOGICAL EXAMINA: HCPCS | Performed by: OBSTETRICS & GYNECOLOGY

## 2024-02-21 NOTE — PATIENT INSTRUCTIONS
Menopause is the time in a woman’s life when she naturally stops having menstrual periods. Menopause marks the end of the reproductive years. The average age of menopause for women in the [place] is 52 years.    Most women enter a transitional phase in the years leading up to menopause called perimenopause. Perimenopause is a time of gradual change in the levels of estrogen, a hormone that helps control the menstrual cycle. Changing estrogen levels can bring on symptoms such as hot flashes and sleep changes. To manage these symptoms, some women may choose to take hormone therapy.      Your Body’s Hormones  During your childbearing years, monthly changes in the production of two hormones--estrogen and progesterone--control your menstrual cycle. These hormones are made by the ovaries. Estrogen causes the endometrium (the lining of the uterus) to grow and thicken to prepare for a possible pregnancy. On about day 14 of your menstrual cycle, an egg is released from one of the ovaries, a process called ovulation. If the egg is not fertilized, no pregnancy occurs. This causes the levels of estrogen and progesterone to decrease, which signals the uterus to shed its lining. This shedding is your monthly period.    Perimenopausal Signs and Symptoms  The signs and symptoms that many women experience during perimenopause are caused by gradually decreasing levels of estrogen. You may have only a few symptoms, or you may have many. Symptoms may be mild, or they may be severe.    Changes in Your Menstrual Cycle  A common sign of perimenopause is a change in your menstrual cycle. Cycles may become longer than usual for you, or they may become shorter. You may begin to skip periods. The amount of flow may become lighter or heavier. Although changes in menstrual bleeding are normal as you approach menopause, you still should report them to your health care provider. Abnormal bleeding may be a sign of a problem. Talk to your  health care provider if you have any of the following:    Bleeding between periods  Bleeding after sex  Spotting at anytime in the menstrual cycle  Bleeding that is heavier or lasts for more days than usual  Any bleeding after menopause  Although the removal of the uterus (a hysterectomy) ends menstrual periods, it does not cause menopause unless the ovaries also are removed. This type of surgery is called an oophorectomy. An oophorectomy causes immediate menopause signs and symptoms if it is done before a woman reaches menopause.    Hot Flashes  Hot flashes are one of the most common symptoms of perimenopause. A hot flash is a sudden feeling of heat that spreads over the face and body. The skin may redden like a blush. You also may break out in a sweat. A hot flash may last from a few seconds to several minutes or longer. Hot flashes are not harmful, but they sometimes are embarrassing and may interfere with daily life.    Some women have hot flashes a few times a month. Others have them several times a day. Hot flashes that happen at night (night sweats) may wake you up and cause you to feel tired and sluggish during the day.    Vaginal and Urinary Tract Changes  As estrogen levels decrease, changes take place in the vagina. Over time, the vaginal lining gets thinner, dryer, and less elastic. Vaginal dryness may cause pain during sexual intercourse. Vaginal infections also may occur more often.    The urinary tract also changes with age. The urethra can become dry, inflamed, or irritated. Some women may need to urinate more often. Women may have an increased risk of urinary tract infections after menopause.    Bone Changes and Osteoporosis  Bones are constantly changing throughout life. Old bone is removed in a process called resorption. New bone is built in a process called formation. During the teen years, bone is formed faster than it is broken down. The amount of bone in the body (sometimes called the “bone  mass”) reaches its peak during the late teen years. In midlife, the process begins to reverse: Bone is broken down faster than it is made. A small amount of bone loss after age 35 years is normal for men and women. But during the first 4-8 years after menopause, women lose bone more rapidly. This rapid loss occurs because of the decreased levels of estrogen. If too much bone is lost, it can increase the risk of osteoporosis. Osteoporosis increases the risk of bone fracture. The bones of the hip, wrist, and spine are affected most often.    Types of Hormone Therapy  Hormone therapy can help relieve the symptoms of perimenopause and menopause. Hormone therapy means taking estrogen and, if you have never had a hysterectomy and still have a uterus, progestin. Progestin is a form of progesterone. Taking progestin helps reduce the risk of cancer of the uterus that occurs when estrogen is used alone. If you do not have a uterus, estrogen is given without progestin. Estrogen plus progestin sometimes is called “combined hormone therapy” or simply “hormone therapy.” Estrogen-only therapy sometimes is called “estrogen therapy.”    Systemic Therapy  Hormone therapy can be either “systemic” or “local.” These two terms describe where and how the hormones act in the body. With systemic therapy, the hormones are released into your bloodstream and travel to the organs and tissues where they are needed. Systemic forms of estrogen include pills, skin patches, and gels and sprays that are applied to the skin. If progestin is prescribed, it can be given separately or combined with estrogen in the same pill or in a patch.    For women taking estrogen-only therapy, estrogen may be taken every day or every few days, depending on the way the estrogen is given. For women taking combined therapy, there are two types of regimens:    Cyclic therapy: Estrogen is taken every day, and progestin is added for several days each month or for several  days every 3 months or 4 months.  Continuous therapy: Estrogen and progestin are taken every day.  Local Therapy    Women who only have vaginal dryness may be prescribed “local” estrogen therapy in the form of a vaginal ring, tablet, or cream. These forms release small doses of estrogen into the vaginal tissue. The estrogen helps restore the thickness and elasticity to the vaginal lining while relieving dryness and irritation.    Benefits, Risks, and Side Effects of Hormone Therapy  Hormone therapy has many benefits, but it also has risks. Side effects also may occur. The following sections summarize the latest information about hormone therapy.    Benefits    Hormone therapy has the following benefits:    Systemic estrogen therapy (with or without progestin) has been shown to be the best treatment for the relief of hot flashes and night sweats.  Systemic and local types of estrogen therapy relieve vaginal dryness.  Systemic estrogen protects against the bone loss that occurs early in menopause and helps prevent hip and spine fractures.  Combined estrogen and progestin therapy may reduce the risk of colon cancer.    Risks    As with any treatment, hormone therapy is not without risks. Hormone therapy may increase the risk of certain types of cancer and other conditions:    Estrogen therapy causes the lining of the uterus to grow and can increase the risk of uterine cancer. Adding progestin decreases the risk of uterine cancer.  Combined hormone therapy is linked to a small increased risk of heart attack. This risk may be related to age, existing medical conditions, and when a woman starts taking hormone therapy. Some research suggests that for women who start combined therapy within 10 years of menopause and who are younger than 60 years, combined therapy may protect against heart attacks. However, combined hormone therapy should not be used solely to protect against heart disease.    Combined hormone therapy and  estrogen-only therapy are associated with a small increased risk of stroke and deep vein thrombosis (DVT). Forms of therapy not taken by mouth (patches, sprays, rings, and others) have less risk of causing DVT than those taken by mouth.    Combined hormone therapy is associated with a small increased risk of breast cancer. Currently, it is recommended that women with a history of hormone-sensitive breast cancer try nonhormonal therapies first for the treatment of menopausal symptoms.  There is a small increased risk of gallbladder disease associated with estrogen therapy with or without progestin. The risk is greatest with forms of therapy taken by mouth.    Side Effects    Combined hormone therapy may cause vaginal spotting. Some women may have heavier bleeding like that of a menstrual period. If you are postmenopausal, it is important to tell your health care provider if you have bleeding. Although it is often an expected side effect of hormone therapy, it also can be a sign of endometrial cancer. All bleeding after menopause should be evaluated.    Other side effects reported by women who take hormone therapy include fluid retention and breast soreness. This soreness usually lasts for a short time.    Current Recommendations  Hormone therapy can help relieve some of the symptoms that affect women at menopause. However, it is important to weigh the benefits and the risks for your individual situation. Before making a decision about hormone therapy, talk to your health care provider about what may work best for you based on your symptoms and your personal and family medical history.    In general, hormone therapy use should be limited to the treatment of menopausal symptoms at the lowest effective dose for the shortest amount of time possible. Continued use should be reevaluated on a yearly basis. Some women may require longer therapy because of persistent symptoms.    Other Therapies  Many women are interested in  therapies other than hormone therapy to treat menopause symptoms. Keep in mind that there are concerns about the safety and effectiveness of some of these options. It is important to talk with your health care provider about the risks and benefits of taking any of these alternatives to hormone therapy.    Medications    An antidepressant is available for the treatment of hot flashes. Gabapentin, an antiseizure medication, and clonidine, a blood pressure medication, are prescription drugs that can be prescribed to reduce hot flashes and ease sleep problems associated with menopause. Selective estrogen receptor modulators (SERMs) are drugs that act on tissues that respond to estrogen. Two drugs that contain SERMs are available for the relief of hot flashes and pain during intercourse caused by vaginal dryness.    Plant-Based Alternatives    Plants and herbs that have been used for menopause symptoms include soy, black cohosh, and Chinese herbal remedies. Only a few of these substances have been studied for safety and effectiveness. Also, the way that these products are made is not regulated by the [place] Food and Drug Administration (FDA). There is no guarantee that the product contains safe ingredients or effective doses of the substance. If you do take one of these products, be sure to let your health care provider know.    Bioidentical Hormones    Bioidentical hormones come from plant sources. They include commercially available products and compounded preparations. Compounded bioidentical hormones are made by a compounding pharmacist from a health care provider’s prescription. Compounded drugs are not regulated by the FDA. They have the same risks as FDA-approved hormone therapies, and they also may have additional risks because of the way they are made. There is no scientific evidence that compounded hormones are safer or more effective than standard hormone therapy.    Vaginal Moisturizers and  Lubricants    Vaginal moisturizers and lubricants are over-the-counter products that can be used to help with vaginal dryness and painful sexual intercourse. They do not contain hormones, so they do not have an effect on the vagina’s thickness or elasticity. Vaginal moisturizers replace moisture and restore the natural acidity of the vagina. You can use a moisturizer every 2-3 days as needed.    Lubricants can be used each time you have sexual intercourse. There are many types of lubricants. Water-soluble lubricants are easily absorbed into the skin and may have to be reapplied frequently. Silicone-based lubricants last longer and tend to be more slippery than water-soluble types. Oil-based lubricants include petroleum jelly, baby oil, or mineral oil. Oil-based types should not be used with latex condoms because they can weaken the latex and cause the condom to break.    Follow-up  If you choose to take hormone therapy, regular follow-up is important. Your need to take hormone therapy may change. Benefits and risks also may change over time. Your health care provider should assess your continued use of hormone therapy on a yearly basis. At your yearly visits, tell your health care provider if you have any new symptoms. Let your health care provider know how well the hormone therapy is working. Report any side effects, especially vaginal bleeding, to your health care provider right away.      [email or web address]/non-[email or web address]-treatments-[email or web address]-symptoms-beyond-the-basics    https://www.menopause.org.au/hp/information-sheets/600-nonhormonal-treatments-[email or web address]-symptoms    Https://www.aafp.org/afp/2006/0201/p457.html    https://www.[facility].org/patient-resources/faqs/womens-health/the-menopause-years

## 2024-02-21 NOTE — PROGRESS NOTES
ASSESSMENT & PLAN:   Diagnoses and all orders for this visit:    Women's annual routine gynecological examination    Hot flashes    Postmenopausal atrophic vaginitis      The following were reviewed in today's visit: ASCCP guidelines, Gardisil vaccination, STD testing breast self exam, mammography screening ordered, menopause, and exercise.    Patient to return to office in yearly for annual exam.     All questions have been answered to her satisfaction.        CC:  Annual Gynecologic Examination  Chief Complaint   Patient presents with    Gynecologic Exam     Pt is here for her yearly exam. Pap utd.       HPI: Michelle Gutierrez is a 58 y.o.  who presents for annual gynecologic examination.  She has the following concerns:  none      Health Maintenance:    Exercise: occasionally  Breast exams/breast awareness: yes  Last mammogram:   Colorectal cancer screenin    Past Medical History:   Diagnosis Date    Allergic rhinitis     Resolved 2017     Cervical neuritis     Contact lens/glasses fitting     COVID-19         Disease of thyroid gland     Dysphagia     mild stasis, dysmotility    Hashimoto's thyroiditis     Migraine 2020    Daily headaches    Miscarriage 1996    Primary localized osteoarthritis of right knee 2018    Sleep apnea     mild    Varicella     As a child    Vitamin D deficiency        Past Surgical History:   Procedure Laterality Date     SECTION      COLONOSCOPY      DILATION AND CURETTAGE OF UTERUS  1996    KNEE SURGERY Left     meniscus       Past OB/Gyn History:   No LMP recorded (lmp unknown). Patient is postmenopausal.    Menopausal status: postmenopausal  Menopausal symptoms: hot flashes, palpitations, vaginal dryness    Last Pap:  : no abnormalities  History of abnormal Pap smear: no    Patient is not currently sexually active.     Family History  Family History   Problem Relation Age of Onset    Hypertension Mother     Hyperlipidemia Mother      Osteoarthritis Mother     Thyroid disease Mother     Hyperthyroidism Mother     Asthma Mother     Coronary artery disease Mother     Heart failure Mother          from heart failure after back surgery 10/21    Heart attack Father     Heart failure Father          from heart attack  at age 76    Heart disease Father          from heart attack at age 76    No Known Problems Sister     No Known Problems Daughter     No Known Problems Brother     Heart block Son     No Known Problems Maternal Aunt     No Known Problems Maternal Aunt     No Known Problems Maternal Aunt     No Known Problems Maternal Aunt     Arthritis Family     Osteoporosis Family        Family history of uterine or ovarian cancer: no  Family history of breast cancer: no  Family history of colon cancer: no    Social History:  Social History     Socioeconomic History    Marital status: /Civil Union     Spouse name: Not on file    Number of children: 2    Years of education: Not on file    Highest education level: Bachelor's degree (e.g., BA, AB, BS)   Occupational History    Occupation: RN, weekends    Tobacco Use    Smoking status: Never    Smokeless tobacco: Never   Vaping Use    Vaping status: Never Used   Substance and Sexual Activity    Alcohol use: No    Drug use: No    Sexual activity: Not Currently     Partners: Male     Birth control/protection: Post-menopausal     Comment: not sexually active and acceptable to both   Other Topics Concern    Not on file   Social History Narrative    Daily cola consumption (__cans/day)    Daily tea consumption (1 cup/day)    Does not exercise     x25 years     Social Determinants of Health     Financial Resource Strain: Not on file   Food Insecurity: Not on file   Transportation Needs: Not on file   Physical Activity: Not on file   Stress: Not on file   Social Connections: Not on file   Intimate Partner Violence: Not on file   Housing Stability: Not on file     Domestic violence  "screen: negative    Allergies:  No Known Allergies    Medications:    Current Outpatient Medications:     Calcium-Cholecalciferol-Zinc (Viactiv Calcium Immune) 650-20-5.5 MG-MCG-MG CHEW, , Disp: , Rfl:     cetirizine (ZyrTEC) 10 MG chewable tablet, Chew 10 mg daily, Disp: , Rfl:     Cholecalciferol 25 MCG (1000 UT) tablet, Take 1,000 Units by mouth daily, Disp: , Rfl:     fluticasone (FLONASE) 50 mcg/act nasal spray, 1 spray into each nostril daily, Disp: , Rfl:     levothyroxine 75 mcg tablet, Take 1 tablet (75 mcg total) by mouth daily, Disp: 90 tablet, Rfl: 1    levothyroxine 75 mcg tablet, Take 1 tablet (75 mcg total) by mouth daily, Disp: 90 tablet, Rfl: 1    omeprazole (PriLOSEC) 20 mg delayed release capsule, Take 1 capsule (20 mg total) by mouth every morning 30 min before breakfast, Disp: 90 capsule, Rfl: 3    Review of Systems:  Review of Systems   Constitutional:  Negative for chills and fever.   Respiratory:  Positive for shortness of breath.    Cardiovascular:  Positive for palpitations. Negative for chest pain.   Gastrointestinal:  Positive for constipation. Negative for abdominal distention, abdominal pain, blood in stool, nausea and vomiting.   Genitourinary:  Positive for dyspareunia and vaginal pain (dryness). Negative for difficulty urinating, dysuria, frequency, pelvic pain, urgency, vaginal bleeding and vaginal discharge.   Neurological:  Headaches: increased over past two months.         Physical Exam:  /78 (BP Location: Left arm, Patient Position: Sitting, Cuff Size: Standard)   Ht 5' 6\" (1.676 m)   Wt 64.9 kg (143 lb)   LMP  (LMP Unknown)   BMI 23.08 kg/m²    Physical Exam  Constitutional:       General: She is awake.      Appearance: Normal appearance. She is well-developed.   Genitourinary:      Vulva, bladder and urethral meatus normal.      Right Labia: No rash, tenderness or lesions.     Left Labia: No tenderness, lesions or rash.     No labial fusion noted.      No vaginal " discharge, erythema, tenderness or bleeding.      No vaginal prolapse present.     Moderate vaginal atrophy present.       Right Adnexa: not tender, not full and no mass present.     Left Adnexa: not tender, not full and no mass present.     Cervix is parous.      No cervical motion tenderness, discharge, lesion or polyp.      Uterus is not enlarged, tender or irregular.      No uterine mass detected.     No urethral prolapse present.      Bladder is not tender.       Pelvic exam was performed with patient in the lithotomy position.   Breasts:     Right: No inverted nipple, mass, nipple discharge, skin change or tenderness.      Left: No inverted nipple, mass, nipple discharge, skin change or tenderness.   HENT:      Head: Normocephalic and atraumatic.   Cardiovascular:      Rate and Rhythm: Normal rate and regular rhythm.      Heart sounds: Normal heart sounds.   Pulmonary:      Effort: Pulmonary effort is normal. No tachypnea or respiratory distress.      Breath sounds: Normal breath sounds.   Abdominal:      General: Abdomen is flat. There is no distension.      Palpations: Abdomen is soft.      Tenderness: There is no abdominal tenderness. There is no guarding or rebound.   Musculoskeletal:      Cervical back: Neck supple.   Lymphadenopathy:      Upper Body:      Right upper body: No supraclavicular or axillary adenopathy.      Left upper body: No supraclavicular or axillary adenopathy.   Neurological:      General: No focal deficit present.      Mental Status: She is alert and oriented to person, place, and time.   Psychiatric:         Mood and Affect: Mood normal.         Behavior: Behavior normal.         Thought Content: Thought content normal.         Judgment: Judgment normal.   Vitals reviewed.

## 2024-03-18 ENCOUNTER — CLINICAL SUPPORT (OUTPATIENT)
Dept: CARDIOLOGY CLINIC | Facility: CLINIC | Age: 59
End: 2024-03-18
Payer: COMMERCIAL

## 2024-03-18 DIAGNOSIS — R00.2 PALPITATIONS: ICD-10-CM

## 2024-03-18 PROCEDURE — 93248 EXT ECG>7D<15D REV&INTERPJ: CPT | Performed by: INTERNAL MEDICINE

## 2024-03-20 ENCOUNTER — HOSPITAL ENCOUNTER (OUTPATIENT)
Dept: RADIOLOGY | Facility: HOSPITAL | Age: 59
Discharge: HOME | End: 2024-03-20
Attending: FAMILY MEDICINE
Payer: COMMERCIAL

## 2024-03-20 VITALS — HEIGHT: 66 IN | BODY MASS INDEX: 22.5 KG/M2 | WEIGHT: 140 LBS

## 2024-03-20 DIAGNOSIS — Z78.0 MENOPAUSE: ICD-10-CM

## 2024-03-20 PROCEDURE — 77080 DXA BONE DENSITY AXIAL: CPT

## 2024-03-21 ENCOUNTER — TELEPHONE (OUTPATIENT)
Dept: FAMILY MEDICINE CLINIC | Facility: CLINIC | Age: 59
End: 2024-03-21

## 2024-03-21 NOTE — TELEPHONE ENCOUNTER
----- Message from Jade Everett MD sent at 3/21/2024 12:30 PM EDT -----  Please inform the patient that she has somewhat weakness of her bones called osteopenia she should continue weightbearing exercise and calcium and vitamin D daily and will repeat DEXA scan in 2 years

## 2024-03-24 NOTE — PROGRESS NOTES
HEMATOLOGY / ONCOLOGY CLINIC FOLLOW UP NOTE    Patient Michelle Gutierrez  MRN: [id number]  : 1965  Date of Encounter 3/26/2024      Referring Provider: Loren Resendiz    Reason for Encounter: follow up       Oncology History    No history exists.           Assessment / Plan:    58 year old PACs, PVCs, sleep apnea, GERD, Hashimoto's thyroiditis with chronically elevated d-dimer since 2015, more recently in 2022, May 2022 and 2024 of 2.56, 2.53 and 2.83 here for evaluation of chronically elevated d-dimer without underlying etiology     Discussion:     Reasons for elevated d-dimer      Thromboembolism:  Arterial  Myocardial infarction  Stroke  Acute limb ischemia  Intracardiac thrombus  Venous  Deep vein thrombosis  Pulmonary embolism  Disseminated intravascular coagulation (DIC) Intravascular thrombosis and fibrinolysis   Inflammation:  COVID-19  Other severe infections  Sepsis  DIC Activation of the acute inflammatory response and coagulation pathway, intravascular thrombosis and fibrinolysis   Surgery/trauma Tissue ischemia, tissue necrosis   Liver disease Reduced clearance of fibrin degradation products   Kidney disease Multiple, including renal vein thrombosis and nephrotic syndrome   Vascular disorders:  Vascular malformations  Sickle cell disease vaso-occlusion Intravascular thrombosis and fibrinolysis   Malignancy Multiple, including vascular abnormalities, cancer procoagulant, and microvascular thrombosis   Thrombolytic therapy Fibrin breakdown   Pregnancy:  Normal pregnancy  Preeclampsia and eclampsia Physiologic changes in the coagulation system  Microvascular thrombosis and fibrin deposition       Elevated d-dimer, chronic    Was found to have elevated d-dimer in 2015; unclear why    More recent evaluations have been in ER for SOB post COVID to rule out PE    As above, long COVID can contribute as can any infection/sepsis.  She has not had an underlying clot, PET, did have phlebitis in  "pregnancy and had no family history of any clotting disorder.  She clearly does not have DIC/chronic DIC although will check fibrinogen level.  She has not had recent surgery, no renal, liver issues and has had no suggestion of any underlying malignancies from scans over past few years as well as clinically    She does have Hashimoto's thryoiditis and does appear to have symptoms consistent with fibromyalgia.  This may be contributing    At this point, will do limited inflammatory workup up, assess fibrinogen and repeat d-dimer    As there appears to be no underlying reason, most likely will be noted in chart but no continued workup.    Follow up    As needed-will call with results          HPI  3/26/2024    58 year old PACs, PVCs, sleep apnea, GERD, Hashimoto's thyroiditis with chronically elevated d-dimer since 2015, more recently in 2022, May 2022 and 2024 of 2.56, 2.53 and 2.83 here for evaluation    She was referred  to hematology regarding a chronically elevated D-dimer.  Whenever she goes to the ED and is determined she has an elevated D-dimer, she then is subject to further CAT scans.  She has not had an evaluation thus far with a hematologist    Colon Ca screenin2019 , up-to-date    Her last admission 2024    Shortness of Breath     Chest Pain       States started with SOB 10 days ago after shoveling snow. Also, experiencing intermittent \" chest pressure \" and palpitations. Originally started with these issues in  after having Covid and had seen cardiologist and had a Cardiac workup and Holter monitor. EKG in progress. No pain at present . Seen at a Urgent Care on  and had EKG done.       58-year-old female presenting today with intermittent chest pain, shortness of breath and palpitations over the past 10 days accompanied with significant fatigue.  States that this has been an ongoing issue over the past few years ever since being diagnosed with COVID, she was told that " she likely has long COVID however her symptoms are largely intermittent.  States that 10 days ago she started with some chest tightness lasting approximately 3 hours after she was shoveling.  Patient relates she has been seen multiple times for this and always has an elevated D-dimer however she does become concerned for potential clots.  States that she has had extensive workups for multiple specialists including ENT, cardiology, pulmonology, endocrinology, and has had pelvic ultrasounds etc.  Has not had any recent viral-like illnesses.  Denies nausea, vomiting, current chest or abdominal pain, flank pain, calf pain or swelling.  Differential clues but is not limited to ACS, electrolyte disturbance, etc.         Latest Reference Range & Units 03/24/22 12:07 05/16/22 14:02 01/30/24 14:01   D-Dimer, Quant <0.50 ug/ml FEU 2.56 (H) 2.53 (H) 2.83 (H)   (H): Data is abnormally high         Palpitations, occurring daily with shortness of breath.  This may be related to deconditioning.  However, will pursue a 2-week event recorder  Chronically elevated [id number] to hematology  Hyperlipidemia.  Heart healthy diet, regular exercise.  Her ASCVD risk score is 2.4%.    Today the patient has no issues.  She is doing well but as above has a history of Hashimoto's thyroiditis.  She has never had a clot, PE, and no family history.  As above, she has no issues that fit any criteria for elevated d-dimer which has been elevated but relatively unchanged.        REVIEW OF SYSTEMS:    Please note that a 14-point review of systems was performed to include Constitutional, HEENT, Respiratory, CVS, GI, , Musculoskeletal, Integumentary, Neurologic, Rheumatologic, Endocrinologic, Psychiatric, Lymphatic, and Hematologic/Oncologic systems were reviewed and are negative unless otherwise stated in HPI. Positive and negative findings pertinent to this evaluation are incorporated into the history of present illness.    As above       ECOG PS:  0    PROBLEM LIST:  Patient Active Problem List   Diagnosis    Primary localized osteoarthritis of right knee    Vaginal atrophy    Annual physical exam    Vitamin D deficiency    Hashimoto's thyroiditis    Premature atrial complex    Premature ventricular contraction    COVID-19 virus infection    Snoring    Mixed hyperlipidemia    DARON (obstructive sleep apnea)    Pharyngoesophageal dysphagia    Globus sensation    Dysphonia    Paresis of left vocal fold    Gastroesophageal reflux disease with esophagitis without hemorrhage    Reflux laryngitis    Esophageal dysmotility    Xerostomia    Menopause       Past Medical History:   has a past medical history of Allergic rhinitis, Cervical neuritis, Contact lens/glasses fitting, COVID-19, Disease of thyroid gland, Dysphagia, Hashimoto's thyroiditis, Migraine (2020), Miscarriage (), Primary localized osteoarthritis of right knee (2018), Sleep apnea, Varicella, and Vitamin D deficiency.    PAST SURGICAL HISTORY:   has a past surgical history that includes  section (); Knee surgery (Left); Dilation and curettage of uterus (); and Colonoscopy.    CURRENT MEDICATIONS  Current Outpatient Medications   Medication Sig Dispense Refill    Calcium-Cholecalciferol-Zinc (Viactiv Calcium Immune) 650-20-5.5 MG-MCG-MG CHEW       cetirizine (ZyrTEC) 10 MG chewable tablet Chew 10 mg daily      Cholecalciferol 25 MCG (1000 UT) tablet Take 1,000 Units by mouth daily      fluticasone (FLONASE) 50 mcg/act nasal spray 1 spray into each nostril daily      levothyroxine 75 mcg tablet Take 1 tablet (75 mcg total) by mouth daily 90 tablet 1    levothyroxine 75 mcg tablet Take 1 tablet (75 mcg total) by mouth daily 90 tablet 1    omeprazole (PriLOSEC) 20 mg delayed release capsule Take 1 capsule (20 mg total) by mouth every morning 30 min before breakfast 90 capsule 3     No current facility-administered medications for this visit.     [unfilled]    SOCIAL HISTORY:    reports that she has never smoked. She has never used smokeless tobacco. She reports that she does not drink alcohol and does not use drugs.     FAMILY HISTORY:  family history includes Arthritis in her family; Asthma in her mother; Coronary artery disease in her mother; Heart attack in her father; Heart block in her son; Heart disease in her father; Heart failure in her father and mother; Hyperlipidemia in her mother; Hypertension in her mother; Hyperthyroidism in her mother; No Known Problems in her brother, daughter, maternal aunt, maternal aunt, maternal aunt, maternal aunt, and sister; Osteoarthritis in her mother; Osteoporosis in her family; Thyroid disease in her mother.     ALLERGIES:  has No Known Allergies.      Physical Exam:  Vital Signs:   Visit Vitals  LMP  (LMP Unknown)   OB Status Postmenopausal   Smoking Status Never     There is no height or weight on file to calculate BMI.  There is no height or weight on file to calculate BSA.    GEN: Alert, awake oriented x3, in no acute distress  HEENT- No pallor, icterus, cyanosis, no oral mucosal lesions,   LAD - no palpable cervical, clavicle, axillary, inguinal LAD  Heart- normal S1 S2, regular rate and rhythm, No murmur, rubs.   Lungs- clear breathing sound bilateral.   Abdomen- soft, Non tender, bowel sounds present  Extremities- No cyanosis, clubbing, edema  Neuro- No focal neurological deficit    Labs:  Lab Results   Component Value Date    WBC 8.63 01/30/2024    HGB 15.1 01/30/2024    HCT 45.1 01/30/2024    MCV 94 01/30/2024     01/30/2024     Lab Results   Component Value Date     09/12/2017    SODIUM 140 01/30/2024    K 3.7 01/30/2024     01/30/2024    CO2 26 01/30/2024    ANIONGAP 7 02/11/2015    AGAP 9 01/30/2024    BUN 13 01/30/2024    CREATININE 0.82 01/30/2024    GLUC 90 01/30/2024    CALCIUM 9.9 01/30/2024    AST 15 01/30/2024    ALT 15 01/30/2024    ALKPHOS 76 01/30/2024    PROT 7.2 09/12/2017    TP 7.3 01/30/2024    BILITOT  "0.6 09/12/2017    TBILI 0.44 01/30/2024    EGFR 79 01/30/2024      Latest Reference Range & Units 06/09/23 14:00 01/30/24 14:01   Sodium 135 - 147 mmol/L  140   Potassium 3.5 - 5.3 mmol/L  3.7   Chloride 96 - 108 mmol/L  105   Carbon Dioxide 21 - 32 mmol/L  26   ANION GAP mmol/L  9   BUN 5 - 25 mg/dL  13   Creatinine 0.60 - 1.30 mg/dL  0.82   GLUCOSE 65 - 140 mg/dL  90   Calcium 8.4 - 10.2 mg/dL  9.9   AST 13 - 39 U/L  15   ALT 7 - 52 U/L  15   ALK PHOS 34 - 104 U/L  76   Total Protein 6.4 - 8.4 g/dL  7.3   Albumin 3.5 - 5.0 g/dL  4.5   Total Bilirubin 0.20 - 1.00 mg/dL  0.44   GFR, Calculated ml/min/1.73sq m  79   hs TnI 0hr \"Refer to ACS Flowchart\"- see link ng/L  <2   BNP 0 - 100 pg/mL  38   WBC 4.31 - 10.16 Thousand/uL  8.63   RBC 3.81 - 5.12 Million/uL  4.82   Hemoglobin 11.5 - 15.4 g/dL  15.1   Hematocrit 34.8 - 46.1 %  45.1   MCV 82 - 98 fL  94   MCH 26.8 - 34.3 pg  31.3   MCHC 31.4 - 37.4 g/dL  33.5   RDW 11.6 - 15.1 %  12.2   Platelet Count 149 - 390 Thousands/uL  323   MPV 8.9 - 12.7 fL  9.7   nRBC /100 WBCs  0   Neutrophils % 43 - 75 %  61   Lymphocytes % 14 - 44 %  30   Monocytes % 4 - 12 %  7   Eosinophils % 0 - 6 %  1   Basophils % 0 - 1 %  1   Immature Grans % 0 - 2 %  0   Absolute Immature Grans 0.00 - 0.20 Thousand/uL  0.02   Absolute Neutrophils 1.85 - 7.62 Thousands/µL  5.27   Absolute Lymphocytes 0.60 - 4.47 Thousands/µL  2.56   Absolute Monocytes 0.17 - 1.22 Thousand/µL  0.63   Absolute Eosinophils 0.00 - 0.61 Thousand/µL  0.10   Absolute Basophils 0.00 - 0.10 Thousands/µL  0.05   PROTIME 11.6 - 14.5 seconds  12.6   POCT INR 0.84 - 1.19   0.92   PTT 23 - 37 seconds  31   TSH 3RD GENERATON 0.450 - 4.500 uIU/mL  2.205   JOHNATHON SCREEN Negative  Negative          I spent 45 minutes on chart review, face to face counseling time, coordination of care and documentation.    Vivian Tsai MD PhD        "

## 2024-03-26 ENCOUNTER — OFFICE VISIT (OUTPATIENT)
Dept: HEMATOLOGY ONCOLOGY | Facility: CLINIC | Age: 59
End: 2024-03-26
Payer: COMMERCIAL

## 2024-03-26 VITALS
TEMPERATURE: 97.7 F | RESPIRATION RATE: 18 BRPM | HEART RATE: 85 BPM | BODY MASS INDEX: 22.98 KG/M2 | DIASTOLIC BLOOD PRESSURE: 72 MMHG | OXYGEN SATURATION: 95 % | HEIGHT: 66 IN | WEIGHT: 143 LBS | SYSTOLIC BLOOD PRESSURE: 128 MMHG

## 2024-03-26 DIAGNOSIS — R79.89 POSITIVE D DIMER: ICD-10-CM

## 2024-03-26 PROCEDURE — 99204 OFFICE O/P NEW MOD 45 MIN: CPT | Performed by: INTERNAL MEDICINE

## 2024-04-03 ENCOUNTER — HOSPITAL ENCOUNTER (OUTPATIENT)
Dept: RADIOLOGY | Facility: HOSPITAL | Age: 59
Discharge: HOME | End: 2024-04-03
Attending: FAMILY MEDICINE
Payer: COMMERCIAL

## 2024-04-03 DIAGNOSIS — R92.8 ABNORMAL MAMMOGRAM: ICD-10-CM

## 2024-04-03 PROCEDURE — 76642 ULTRASOUND BREAST LIMITED: CPT

## 2024-04-03 PROCEDURE — 77066 DX MAMMO INCL CAD BI: CPT

## 2024-04-03 PROCEDURE — G0279 TOMOSYNTHESIS, MAMMO: HCPCS

## 2024-05-24 ENCOUNTER — OFFICE VISIT (OUTPATIENT)
Dept: CARDIOLOGY CLINIC | Facility: CLINIC | Age: 59
End: 2024-05-24
Payer: COMMERCIAL

## 2024-05-24 VITALS
WEIGHT: 143 LBS | OXYGEN SATURATION: 98 % | SYSTOLIC BLOOD PRESSURE: 122 MMHG | HEIGHT: 66 IN | DIASTOLIC BLOOD PRESSURE: 80 MMHG | HEART RATE: 75 BPM | BODY MASS INDEX: 22.98 KG/M2

## 2024-05-24 DIAGNOSIS — I49.1 PREMATURE ATRIAL COMPLEX: Primary | ICD-10-CM

## 2024-05-24 DIAGNOSIS — I49.3 PREMATURE VENTRICULAR CONTRACTION: ICD-10-CM

## 2024-05-24 PROCEDURE — 99214 OFFICE O/P EST MOD 30 MIN: CPT | Performed by: INTERNAL MEDICINE

## 2024-05-24 NOTE — PROGRESS NOTES
Cardiology Follow Up    Michelle Gutierrez  1965  [id number]  [facility]  [address]    [place] [address] [phone]    1. Premature atrial complex        2. Premature ventricular contraction            Interval History: Followup PACs.     Very minimal palpitations. No chest pain or shortness of breath. We reviewed her zio.     Medical Problems       Problem List       Primary localized osteoarthritis of right knee    Vaginal atrophy    Annual physical exam    Vitamin D deficiency    Hashimoto's thyroiditis    Premature atrial complex    Premature ventricular contraction    COVID-19 virus infection    Snoring    Mixed hyperlipidemia    DARON (obstructive sleep apnea)    Pharyngoesophageal dysphagia    Globus sensation    Dysphonia    Paresis of left vocal fold    Gastroesophageal reflux disease with esophagitis without hemorrhage    Reflux laryngitis    Esophageal dysmotility    Xerostomia    Menopause        Past Medical History:   Diagnosis Date    Allergic rhinitis     Resolved 8/23/2017     Cervical neuritis     Contact lens/glasses fitting     COVID-19     12/20    Disease of thyroid gland     Dysphagia     mild stasis, dysmotility    Hashimoto's thyroiditis     Migraine 08/24/2020    Daily headaches    Miscarriage 1996    Primary localized osteoarthritis of right knee 07/31/2018    Sleep apnea     mild    Varicella     As a child    Vitamin D deficiency      Social History     Socioeconomic History    Marital status: /Civil Union     Spouse name: Not on file    Number of children: 2    Years of education: Not on file    Highest education level: Bachelor's degree (e.g., BA, AB, BS)   Occupational History    Occupation: RN, weekends    Tobacco Use    Smoking status: Never    Smokeless tobacco: Never   Vaping Use    Vaping status: Never Used   Substance and Sexual Activity    Alcohol use: No    Drug use: No     Sexual activity: Not Currently     Partners: Male     Birth control/protection: Post-menopausal, None     Comment: not sexually active and acceptable to both   Other Topics Concern    Not on file   Social History Narrative    Daily cola consumption (__cans/day)    Daily tea consumption (1 cup/day)    Does not exercise     x25 years     Social Determinants of Health     Financial Resource Strain: Not on file   Food Insecurity: Not on file   Transportation Needs: Not on file   Physical Activity: Not on file   Stress: Not on file   Social Connections: Not on file   Intimate Partner Violence: Not on file   Housing Stability: Not on file      Family History   Problem Relation Age of Onset    Hypertension Mother     Hyperlipidemia Mother     Osteoarthritis Mother     Thyroid disease Mother     Hyperthyroidism Mother     Asthma Mother     Coronary artery disease Mother     Heart failure Mother          from heart failure after back surgery 10/21    Heart attack Father     Heart failure Father          from heart attack  at age 76    Heart disease Father          from heart attack at age 76    No Known Problems Sister     No Known Problems Daughter     No Known Problems Brother     Heart block Son     No Known Problems Maternal Aunt     No Known Problems Maternal Aunt     No Known Problems Maternal Aunt     No Known Problems Maternal Aunt     Arthritis Family     Osteoporosis Family      Past Surgical History:   Procedure Laterality Date     SECTION      COLONOSCOPY      DILATION AND CURETTAGE OF UTERUS      KNEE SURGERY Left     meniscus       Current Outpatient Medications:     Calcium-Cholecalciferol-Zinc (Viactiv Calcium Immune) 650-20-5.5 MG-MCG-MG CHEW, , Disp: , Rfl:     cetirizine (ZyrTEC) 10 MG chewable tablet, Chew 10 mg daily, Disp: , Rfl:     Cholecalciferol 25 MCG (1000 UT) tablet, Take 1,000 Units by mouth daily, Disp: , Rfl:     fluticasone (FLONASE) 50 mcg/act nasal  "spray, 1 spray into each nostril daily, Disp: , Rfl:     levothyroxine 75 mcg tablet, Take 1 tablet (75 mcg total) by mouth daily, Disp: 90 tablet, Rfl: 1    levothyroxine 75 mcg tablet, Take 1 tablet (75 mcg total) by mouth daily, Disp: 90 tablet, Rfl: 1    omeprazole (PriLOSEC) 20 mg delayed release capsule, Take 1 capsule (20 mg total) by mouth every morning 30 min before breakfast (Patient not taking: Reported on 3/26/2024), Disp: 90 capsule, Rfl: 3  No Known Allergies    Labs:     Chemistry        Component Value Date/Time     09/12/2017 0844    K 3.7 01/30/2024 1401    K 3.9 03/13/2023 0956     01/30/2024 1401     03/13/2023 0956    CO2 26 01/30/2024 1401    CO2 29 03/13/2023 0956    BUN 13 01/30/2024 1401    BUN 15 03/13/2023 0956    CREATININE 0.82 01/30/2024 1401    CREATININE 0.76 09/12/2017 0844        Component Value Date/Time    CALCIUM 9.9 01/30/2024 1401    CALCIUM 9.7 03/13/2023 0956    ALKPHOS 76 01/30/2024 1401    ALKPHOS 80 03/13/2023 0956    AST 15 01/30/2024 1401    AST 18 03/13/2023 0956    ALT 15 01/30/2024 1401    ALT 16 03/13/2023 0956    BILITOT 0.6 09/12/2017 0844            No results found for: \"CHOL\"  Lab Results   Component Value Date    HDL 59 03/13/2023    HDL 61 03/28/2022    HDL 67 11/07/2019     Lab Results   Component Value Date    LDLCALC 149 (H) 03/13/2023    LDLCALC 143 (H) 03/28/2022    LDLCALC 123 (H) 11/07/2019     Lab Results   Component Value Date    TRIG 99 03/13/2023    TRIG 70 03/28/2022    TRIG 72 11/07/2019     No results found for: \"CHOLHDL\"    Imaging: No results found.    EKG: .    Review of Systems   Constitutional: Positive for malaise/fatigue.   HENT: Negative.     Eyes: Negative.    Cardiovascular:  Positive for palpitations.   Respiratory: Negative.     Endocrine: Negative.    Hematologic/Lymphatic: Negative.    Skin: Negative.    Musculoskeletal: Negative.    Gastrointestinal: Negative.    Genitourinary: Negative.    Neurological: Negative. "    Psychiatric/Behavioral: Negative.     Allergic/Immunologic: Negative.        Vitals:    05/24/24 1138   BP: 122/80   Pulse: 75   SpO2: 98%           Physical Exam  Vitals and nursing note reviewed.   Constitutional:       Appearance: Normal appearance.   HENT:      Head: Normocephalic.      Nose: Nose normal.      Mouth/Throat:      Mouth: Mucous membranes are moist.   Eyes:      General: No scleral icterus.     Conjunctiva/sclera: Conjunctivae normal.   Cardiovascular:      Rate and Rhythm: Normal rate and regular rhythm.      Heart sounds: No murmur heard.     No gallop.   Pulmonary:      Effort: Pulmonary effort is normal. No respiratory distress.      Breath sounds: Normal breath sounds. No wheezing or rales.   Abdominal:      General: Abdomen is flat. Bowel sounds are normal. There is no distension.      Palpations: Abdomen is soft.      Tenderness: There is no abdominal tenderness. There is no guarding.   Musculoskeletal:      Cervical back: Normal range of motion and neck supple.      Right lower leg: No edema.      Left lower leg: No edema.   Skin:     General: Skin is warm and dry.   Neurological:      General: No focal deficit present.      Mental Status: She is alert and oriented to person, place, and time.   Psychiatric:         Mood and Affect: Mood normal.         Behavior: Behavior normal.         Discussion/Summary:    Palpitations: She had some pacs and pvcs on her zio.  She is currently minimally symptomatic. Zio reviewed. No changes today.       The patient was counseled regarding diagnostic results, instructions for management, risk factor reductions, impressions. total time of encounter was 25 minutes and 15 minutes was spent counseling.

## 2024-08-10 LAB
ALBUMIN SERPL-MCNC: 4.5 G/DL (ref 3.6–5.1)
ALBUMIN/GLOB SERPL: 1.7 (CALC) (ref 1–2.5)
ALP SERPL-CCNC: 42 U/L (ref 37–153)
ALT SERPL-CCNC: 11 U/L (ref 6–29)
AST SERPL-CCNC: 17 U/L (ref 10–35)
BASOPHILS # BLD AUTO: 29 CELLS/UL (ref 0–200)
BASOPHILS NFR BLD AUTO: 0.4 %
BILIRUB SERPL-MCNC: 1.3 MG/DL (ref 0.2–1.2)
BUN SERPL-MCNC: 9 MG/DL (ref 7–25)
BUN/CREAT SERPL: ABNORMAL (CALC) (ref 6–22)
CALCIUM SERPL-MCNC: 9.2 MG/DL (ref 8.6–10.4)
CHLORIDE SERPL-SCNC: 106 MMOL/L (ref 98–110)
CHOLEST SERPL-MCNC: 143 MG/DL
CHOLEST/HDLC SERPL: 2.7 (CALC)
CO2 SERPL-SCNC: 26 MMOL/L (ref 20–32)
CREAT SERPL-MCNC: 0.54 MG/DL (ref 0.5–1.03)
D DIMER PPP FEU-MCNC: <0.19 MCG/ML FEU
EOSINOPHIL # BLD AUTO: 86 CELLS/UL (ref 15–500)
EOSINOPHIL NFR BLD AUTO: 1.2 %
ERYTHROCYTE [DISTWIDTH] IN BLOOD BY AUTOMATED COUNT: 12.2 % (ref 11–15)
ERYTHROCYTE [SEDIMENTATION RATE] IN BLOOD BY WESTERGREN METHOD: 2 MM/H
FIBRINOGEN PPP-MCNC: 320 MG/DL (ref 175–425)
GFR/BSA.PRED SERPLBLD CYS-BASED-ARV: 107 ML/MIN/1.73M2
GLOBULIN SER CALC-MCNC: 2.6 G/DL (CALC) (ref 1.9–3.7)
GLUCOSE SERPL-MCNC: 77 MG/DL (ref 65–99)
HCT VFR BLD AUTO: 42.5 % (ref 35–45)
HDLC SERPL-MCNC: 53 MG/DL
HGB BLD-MCNC: 14.4 G/DL (ref 11.7–15.5)
LDLC SERPL CALC-MCNC: 76 MG/DL (CALC)
LYMPHOCYTES # BLD AUTO: 2412 CELLS/UL (ref 850–3900)
LYMPHOCYTES NFR BLD AUTO: 33.5 %
MCH RBC QN AUTO: 31.3 PG (ref 27–33)
MCHC RBC AUTO-ENTMCNC: 33.9 G/DL (ref 32–36)
MCV RBC AUTO: 92.4 FL (ref 80–100)
MONOCYTES # BLD AUTO: 461 CELLS/UL (ref 200–950)
MONOCYTES NFR BLD AUTO: 6.4 %
NEUTROPHILS # BLD AUTO: 4212 CELLS/UL (ref 1500–7800)
NEUTROPHILS NFR BLD AUTO: 58.5 %
NONHDLC SERPL-MCNC: 90 MG/DL (CALC)
PLATELET # BLD AUTO: 359 THOUSAND/UL (ref 140–400)
PMV BLD REES-ECKER: 9.9 FL (ref 7.5–12.5)
POTASSIUM SERPL-SCNC: 4.2 MMOL/L (ref 3.5–5.3)
PROT SERPL-MCNC: 7.1 G/DL (ref 6.1–8.1)
RBC # BLD AUTO: 4.6 MILLION/UL (ref 3.8–5.1)
SODIUM SERPL-SCNC: 139 MMOL/L (ref 135–146)
TRIGL SERPL-MCNC: 60 MG/DL
WBC # BLD AUTO: 7.2 THOUSAND/UL (ref 3.8–10.8)

## 2024-08-12 LAB
CRP SERPL-MCNC: <3 MG/L
D DIMER PPP FEU-MCNC: <0.19 MCG/ML FEU
ERYTHROCYTE [SEDIMENTATION RATE] IN BLOOD BY WESTERGREN METHOD: 2 MM/H
FIBRINOGEN PPP-MCNC: 320 MG/DL (ref 175–425)
LDH SERPL-CCNC: 111 U/L (ref 120–250)
RHEUMATOID FACT SERPL-ACNC: 10 IU/ML

## 2024-11-14 ENCOUNTER — OFFICE VISIT (OUTPATIENT)
Dept: FAMILY MEDICINE CLINIC | Facility: CLINIC | Age: 59
End: 2024-11-14
Payer: COMMERCIAL

## 2024-11-14 VITALS
DIASTOLIC BLOOD PRESSURE: 76 MMHG | WEIGHT: 145 LBS | BODY MASS INDEX: 23.3 KG/M2 | RESPIRATION RATE: 18 BRPM | SYSTOLIC BLOOD PRESSURE: 128 MMHG | HEIGHT: 66 IN | TEMPERATURE: 98.1 F

## 2024-11-14 DIAGNOSIS — J20.9 ACUTE BRONCHITIS, UNSPECIFIED ORGANISM: Primary | ICD-10-CM

## 2024-11-14 PROCEDURE — 99213 OFFICE O/P EST LOW 20 MIN: CPT | Performed by: FAMILY MEDICINE

## 2024-11-14 RX ORDER — ONDANSETRON 4 MG/1
4 TABLET, FILM COATED ORAL EVERY 8 HOURS PRN
Qty: 20 TABLET | Refills: 0 | Status: SHIPPED | OUTPATIENT
Start: 2024-11-14

## 2024-11-14 RX ORDER — AZITHROMYCIN 250 MG/1
TABLET, FILM COATED ORAL
Qty: 6 TABLET | Refills: 0 | Status: SHIPPED | OUTPATIENT
Start: 2024-11-14 | End: 2024-11-18

## 2024-11-14 NOTE — ASSESSMENT & PLAN NOTE
- Patient has had exposure to mycoplasma pneumonia through work.  The only way to figure out if that is what she has would be to send her for a respiratory panel however she will be leaving for [place] for next week    -Respiratory panel would be very expensive to perform and she would be away when results return.  I would rather just placed the patient on course of Zithromax.  She verbalized understanding    -Patient will also be given prescription for as needed Zofran to take as needed for nausea    -Follow-up if no improvement or resolution of symptoms

## 2024-11-14 NOTE — PROGRESS NOTES
Subjective:      Patient ID: Michelle Gutierrez is a 59 y.o. female.    59-year-old female presents to the office complaining of of several days of sore throat, chest congestion, dry cough.  No fevers or chills.  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  She states that they have seen an unprecedented number of cases of mycoplasma pneumonia in children upwards of 20/day in their office that have been testing positive.  She did swab herself for influenza as well as COVID-19 both of which were negative.  She will be traveling to [place] in 2 days to visit family.        Past Medical History:   Diagnosis Date    Allergic rhinitis     Resolved 2017     Cervical neuritis     Contact lens/glasses fitting     COVID-19         Disease of thyroid gland     Dysphagia     mild stasis, dysmotility    Hashimoto's thyroiditis     Migraine 2020    Daily headaches    Miscarriage 1996    Primary localized osteoarthritis of right knee 2018    Sleep apnea     mild    Varicella     As a child    Vitamin D deficiency        Family History   Problem Relation Age of Onset    Hypertension Mother     Hyperlipidemia Mother     Osteoarthritis Mother     Thyroid disease Mother     Hyperthyroidism Mother     Asthma Mother     Coronary artery disease Mother     Heart failure Mother          from heart failure after back surgery 10/21    Heart attack Father     Heart failure Father          from heart attack  at age 76    Heart disease Father          from heart attack at age 76    No Known Problems Sister     No Known Problems Daughter     No Known Problems Brother     Heart block Son     No Known Problems Maternal Aunt     No Known Problems Maternal Aunt     No Known Problems Maternal Aunt     No Known Problems Maternal Aunt     Arthritis Family     Osteoporosis Family        Past Surgical History:   Procedure Laterality Date     SECTION      COLONOSCOPY      DILATION AND  "CURETTAGE OF UTERUS  1996    KNEE SURGERY Left     meniscus        reports that she has never smoked. She has never used smokeless tobacco. She reports that she does not drink alcohol and does not use drugs.      Current Outpatient Medications:     azithromycin (ZITHROMAX) 250 mg tablet, Take 2 tablets today then 1 tablet daily x 4 days, Disp: 6 tablet, Rfl: 0    Calcium-Cholecalciferol-Zinc (Viactiv Calcium Immune) 650-20-5.5 MG-MCG-MG CHEW, , Disp: , Rfl:     cetirizine (ZyrTEC) 10 MG chewable tablet, Chew 10 mg daily, Disp: , Rfl:     Cholecalciferol 25 MCG (1000 UT) tablet, Take 1,000 Units by mouth daily, Disp: , Rfl:     fluticasone (FLONASE) 50 mcg/act nasal spray, 1 spray into each nostril daily, Disp: , Rfl:     levothyroxine 75 mcg tablet, Take 1 tablet (75 mcg total) by mouth daily, Disp: 90 tablet, Rfl: 1    ondansetron (ZOFRAN) 4 mg tablet, Take 1 tablet (4 mg total) by mouth every 8 (eight) hours as needed for nausea or vomiting, Disp: 20 tablet, Rfl: 0    The following portions of the patient's history were reviewed and updated as appropriate: allergies, current medications, past family history, past medical history, past social history, past surgical history and problem list.    Review of Systems   Constitutional:  Positive for fatigue. Negative for activity change, appetite change, chills, diaphoresis, fever and unexpected weight change.   HENT:  Negative for congestion, ear pain, postnasal drip, rhinorrhea and sore throat.    Respiratory:  Positive for cough. Negative for chest tightness, shortness of breath, wheezing and stridor.    Cardiovascular: Negative.    Gastrointestinal:  Positive for nausea.   All other systems reviewed and are negative.          Objective:    /76   Temp 98.1 °F (36.7 °C) (Tympanic)   Resp 18   Ht 5' 6\" (1.676 m)   Wt 65.8 kg (145 lb)   LMP  (LMP Unknown)   BMI 23.40 kg/m²      Physical Exam  Vitals and nursing note reviewed.   Constitutional:       General: " She is not in acute distress.     Appearance: She is well-developed. She is not ill-appearing or toxic-appearing.   HENT:      Head: Normocephalic and atraumatic.      Right Ear: Hearing, tympanic membrane, ear canal and external ear normal.      Left Ear: Hearing, tympanic membrane and external ear normal.      Nose: No nasal deformity, septal deviation or mucosal edema.      Right Sinus: No maxillary sinus tenderness or frontal sinus tenderness.      Left Sinus: No maxillary sinus tenderness or frontal sinus tenderness.      Mouth/Throat:      Pharynx: No oropharyngeal exudate.   Eyes:      Conjunctiva/sclera: Conjunctivae normal.      Pupils: Pupils are equal, round, and reactive to light.   Cardiovascular:      Rate and Rhythm: Normal rate and regular rhythm.   Pulmonary:      Effort: Pulmonary effort is normal. No accessory muscle usage or respiratory distress.      Breath sounds: No wheezing, rhonchi or rales.   Musculoskeletal:      Cervical back: Normal range of motion and neck supple.           No results found for this or any previous visit (from the past 6 weeks).    Assessment/Plan:    Acute bronchitis  - Patient has had exposure to mycoplasma pneumonia through work.  The only way to figure out if that is what she has would be to send her for a respiratory panel however she will be leaving for [place] for next week    -Respiratory panel would be very expensive to perform and she would be away when results return.  I would rather just placed the patient on course of Zithromax.  She verbalized understanding    -Patient will also be given prescription for as needed Zofran to take as needed for nausea    -Follow-up if no improvement or resolution of symptoms          Problem List Items Addressed This Visit          Respiratory    Acute bronchitis - Primary    - Patient has had exposure to mycoplasma pneumonia through work.  The only way to figure out if that is what she has would be to send her for a  respiratory panel however she will be leaving for [place] for next week    -Respiratory panel would be very expensive to perform and she would be away when results return.  I would rather just placed the patient on course of Zithromax.  She verbalized understanding    -Patient will also be given prescription for as needed Zofran to take as needed for nausea    -Follow-up if no improvement or resolution of symptoms         Relevant Medications    azithromycin (ZITHROMAX) 250 mg tablet    ondansetron (ZOFRAN) 4 mg tablet

## 2025-01-14 ENCOUNTER — OFFICE VISIT (OUTPATIENT)
Dept: FAMILY MEDICINE CLINIC | Facility: CLINIC | Age: 60
End: 2025-01-14
Payer: COMMERCIAL

## 2025-01-14 VITALS
OXYGEN SATURATION: 98 % | WEIGHT: 144 LBS | HEIGHT: 66 IN | BODY MASS INDEX: 23.14 KG/M2 | DIASTOLIC BLOOD PRESSURE: 80 MMHG | RESPIRATION RATE: 16 BRPM | HEART RATE: 92 BPM | SYSTOLIC BLOOD PRESSURE: 120 MMHG

## 2025-01-14 DIAGNOSIS — Z00.00 ANNUAL PHYSICAL EXAM: Primary | ICD-10-CM

## 2025-01-14 DIAGNOSIS — Z13.6 SCREENING FOR CARDIOVASCULAR CONDITION: ICD-10-CM

## 2025-01-14 DIAGNOSIS — Z12.31 ENCOUNTER FOR SCREENING MAMMOGRAM FOR BREAST CANCER: ICD-10-CM

## 2025-01-14 DIAGNOSIS — Z13.1 SCREENING FOR DIABETES MELLITUS: ICD-10-CM

## 2025-01-14 PROBLEM — J20.9 ACUTE BRONCHITIS: Status: RESOLVED | Noted: 2024-11-14 | Resolved: 2025-01-14

## 2025-01-14 PROCEDURE — 99396 PREV VISIT EST AGE 40-64: CPT | Performed by: FAMILY MEDICINE

## 2025-01-14 NOTE — PROGRESS NOTES
Adult Annual Physical  Name: Michelle Gutierrez      : 1965      MRN: [id number]  Encounter Provider: Jade Everett MD  Encounter Date: 2025   Encounter department: [facility] [place]    Assessment & Plan  Annual physical exam  Advised to get shingles vaccine, she will think about it,  Will be repeated in 2 years       Encounter for screening mammogram for breast cancer    Orders:  •  Mammo screening bilateral w 3d and cad; Future    Screening for diabetes mellitus  Labs ordered  Orders:  •  Comprehensive metabolic panel; Future    Screening for cardiovascular condition    Orders:  •  CBC and differential; Future  •  Lipid panel; Future    Immunizations and preventive care screenings were discussed with patient today. Appropriate education was printed on patient's after visit summary.    Counseling:  Dental Health: discussed importance of regular tooth brushing, flossing, and dental visits.  Exercise: the importance of regular exercise/physical activity was discussed. Recommend exercise 3-5 times per week for at least 30 minutes.          History of Present Illness     Adult Annual Physical:  Patient presents for annual physical. Physical,   Overall she is fine, goes to endocrinologist and take levothyroxine, and they monitor her thyroid nodule,  She had always elevated D-dimer and was seen by hematologist, recent workup is negative, and D-dimer was normal, she also have irregularity in bowel movements, and always feel slight tender in the right lower abdomen for that CAT scan was done for same reason in  and it was normal, and she is up-to-date on colonoscopy, she also has appointment with gynecologist.     Diet and Physical Activity:  - Diet/Nutrition: well balanced diet.  - Exercise: walking.    Depression Screening:  - PHQ-2 Score: 0    General Health:  - Sleep: sleeps well.  - Hearing: normal hearing bilateral ears.  - Vision:. Follows ophthalmologist  - Dental: regular dental  "visits.    /GYN Health:  - Follows with GYN: yes.   - History of STDs: no    Review of Systems   Constitutional:  Negative for activity change, appetite change, chills, fatigue, fever and unexpected weight change.   HENT:  Negative for congestion, ear discharge, ear pain, nosebleeds, postnasal drip, rhinorrhea, sinus pressure, sneezing, sore throat, trouble swallowing and voice change.    Eyes:  Negative for photophobia, pain, discharge, redness and itching.   Respiratory:  Negative for cough, chest tightness, shortness of breath and wheezing.    Cardiovascular:  Negative for chest pain, palpitations and leg swelling.   Gastrointestinal:  Negative for abdominal pain, constipation, diarrhea, nausea and vomiting.   Endocrine: Negative for polyuria.   Genitourinary:  Negative for dysuria, frequency and urgency.   Musculoskeletal:  Negative for arthralgias, back pain, myalgias and neck pain.   Skin:  Negative for color change, pallor and rash.   Allergic/Immunologic: Negative for environmental allergies and food allergies.   Neurological:  Negative for dizziness, weakness, light-headedness and headaches.   Hematological:  Negative for adenopathy. Does not bruise/bleed easily.   Psychiatric/Behavioral:  Negative for behavioral problems. The patient is not nervous/anxious.          Objective   /80   Pulse 92   Resp 16   Ht 5' 6\" (1.676 m)   Wt 65.3 kg (144 lb)   LMP  (LMP Unknown)   SpO2 98%   BMI 23.24 kg/m²     Physical Exam  Vitals and nursing note reviewed.   Constitutional:       General: She is not in acute distress.     Appearance: Normal appearance. She is not ill-appearing.   HENT:      Head: Normocephalic and atraumatic.      Right Ear: Tympanic membrane and ear canal normal. There is no impacted cerumen.      Left Ear: Tympanic membrane and ear canal normal. There is no impacted cerumen.      Nose: Nose normal. No congestion or rhinorrhea.      Mouth/Throat:      Mouth: Mucous membranes are " moist.      Pharynx: Oropharynx is clear. No oropharyngeal exudate or posterior oropharyngeal erythema.   Eyes:      General: No scleral icterus.        Right eye: No discharge.         Left eye: No discharge.      Extraocular Movements: Extraocular movements intact.      Conjunctiva/sclera: Conjunctivae normal.   Cardiovascular:      Rate and Rhythm: Normal rate and regular rhythm.      Heart sounds: Normal heart sounds. No murmur heard.  Pulmonary:      Effort: Pulmonary effort is normal.      Breath sounds: Normal breath sounds. No wheezing or rales.   Abdominal:      General: Abdomen is flat. Bowel sounds are normal. There is no distension.      Palpations: Abdomen is soft. There is no mass.      Tenderness: There is no abdominal tenderness.   Musculoskeletal:         General: No swelling, tenderness or deformity. Normal range of motion.      Cervical back: Normal range of motion and neck supple. No muscular tenderness.      Right lower leg: No edema.      Left lower leg: No edema.   Lymphadenopathy:      Cervical: No cervical adenopathy.   Skin:     Coloration: Skin is not jaundiced or pale.      Findings: No erythema, lesion or rash.   Neurological:      General: No focal deficit present.      Mental Status: She is alert and oriented to person, place, and time.      Gait: Gait normal.   Psychiatric:         Mood and Affect: Mood normal.         Behavior: Behavior normal.

## 2025-01-14 NOTE — PATIENT INSTRUCTIONS
"Patient Education     Routine physical for adults   The Basics   Written by the doctors and editors at [facility]   What is a physical? -- A physical is a routine visit, or \"check-up,\" with your doctor. You might also hear it called a \"wellness visit\" or \"preventive visit.\"  During each visit, the doctor will:   Ask about your physical and mental health   Ask about your habits, behaviors, and lifestyle   Do an exam   Give you vaccines if needed   Talk to you about any medicines you take   Give advice about your health   Answer your questions  Getting regular check-ups is an important part of taking care of your health. It can help your doctor find and treat any problems you have. But it's also important for preventing health problems.  A routine physical is different from a \"sick visit.\" A sick visit is when you see a doctor because of a health concern or problem. Since physicals are scheduled ahead of time, you can think about what you want to ask the doctor.  How often should I get a physical? -- It depends on your age and health. In general, for people age 21 years and older:   If you are younger than 50 years, you might be able to get a physical every 3 years.   If you are 50 years or older, your doctor might recommend a physical every year.  If you have an ongoing health condition, like diabetes or high blood pressure, your doctor will probably want to see you more often.  What happens during a physical? -- In general, each visit will include:   Physical exam - The doctor or nurse will check your height, weight, heart rate, and blood pressure. They will also look at your eyes and ears. They will ask about how you are feeling and whether you have any symptoms that bother you.   Medicines - It's a good idea to bring a list of all the medicines you take to each doctor visit. Your doctor will talk to you about your medicines and answer any questions. Tell them if you are having any side effects that bother you. You " "should also tell them if you are having trouble paying for any of your medicines.   Habits and behaviors - This includes:   Your diet   Your exercise habits   Whether you smoke, drink alcohol, or use drugs   Whether you are sexually active   Whether you feel safe at home  Your doctor will talk to you about things you can do to improve your health and lower your risk of health problems. They will also offer help and support. For example, if you want to quit smoking, they can give you advice and might prescribe medicines. If you want to improve your diet or get more physical activity, they can help you with this, too.   Lab tests, if needed - The tests you get will depend on your age and situation. For example, your doctor might want to check your:   Cholesterol   Blood sugar   Iron level   Vaccines - The recommended vaccines will depend on your age, health, and what vaccines you already had. Vaccines are very important because they can prevent certain serious or deadly infections.   Discussion of screening - \"Screening\" means checking for diseases or other health problems before they cause symptoms. Your doctor can recommend screening based on your age, risk, and preferences. This might include tests to check for:   Cancer, such as breast, prostate, cervical, ovarian, colorectal, prostate, lung, or skin cancer   Sexually transmitted infections, such as chlamydia and gonorrhea   Mental health conditions like depression and anxiety  Your doctor will talk to you about the different types of screening tests. They can help you decide which screenings to have. They can also explain what the results might mean.   Answering questions - The physical is a good time to ask the doctor or nurse questions about your health. If needed, they can refer you to other doctors or specialists, too.  Adults older than 65 years often need other care, too. As you get older, your doctor will talk to you about:   How to prevent falling at " home   Hearing or vision tests   Memory testing   How to take your medicines safely   Making sure that you have the help and support you need at home  All topics are updated as new evidence becomes available and our peer review process is complete.  This topic retrieved from [employer] on: May 02, 2024.  Topic 645361 Version 1.0  Release: 32.4.3 - C32.122  © 2024 UpToDate, Inc. and/or its affiliates. All rights reserved.  Consumer Information Use and Disclaimer   Disclaimer: This generalized information is a limited summary of diagnosis, treatment, and/or medication information. It is not meant to be comprehensive and should be used as a tool to help the user understand and/or assess potential diagnostic and treatment options. It does NOT include all information about conditions, treatments, medications, side effects, or risks that may apply to a specific patient. It is not intended to be medical advice or a substitute for the medical advice, diagnosis, or treatment of a health care provider based on the health care provider's examination and assessment of a patient's specific and unique circumstances. Patients must speak with a health care provider for complete information about their health, medical questions, and treatment options, including any risks or benefits regarding use of medications. This information does not endorse any treatments or medications as safe, effective, or approved for treating a specific patient. UpToDate, Inc. and its affiliates disclaim any warranty or liability relating to this information or the use thereof.The use of this information is governed by the Terms of Use, available at https://www.[employer]uwer.com/en/know/clinical-effectiveness-terms. 2024© UpToDate, Inc. and its affiliates and/or licensors. All rights reserved.  Copyright   © 2024 Up[employer]Date, Inc. and/or its affiliates. All rights reserved.

## 2025-02-13 PROBLEM — Z13.6 SCREENING FOR CARDIOVASCULAR CONDITION: Status: RESOLVED | Noted: 2018-10-04 | Resolved: 2025-02-13

## 2025-02-13 PROBLEM — Z13.1 SCREENING FOR DIABETES MELLITUS: Status: RESOLVED | Noted: 2018-10-04 | Resolved: 2025-02-13

## 2025-02-24 ENCOUNTER — ANNUAL EXAM (OUTPATIENT)
Dept: OBGYN CLINIC | Facility: CLINIC | Age: 60
End: 2025-02-24
Payer: COMMERCIAL

## 2025-02-24 VITALS
WEIGHT: 144.8 LBS | DIASTOLIC BLOOD PRESSURE: 76 MMHG | HEIGHT: 66 IN | SYSTOLIC BLOOD PRESSURE: 112 MMHG | BODY MASS INDEX: 23.27 KG/M2

## 2025-02-24 DIAGNOSIS — Z12.4 SCREENING FOR MALIGNANT NEOPLASM OF THE CERVIX: ICD-10-CM

## 2025-02-24 DIAGNOSIS — Z11.51 SCREENING FOR HPV (HUMAN PAPILLOMAVIRUS): ICD-10-CM

## 2025-02-24 DIAGNOSIS — N95.2 POSTMENOPAUSAL ATROPHIC VAGINITIS: ICD-10-CM

## 2025-02-24 DIAGNOSIS — Z01.419 ROUTINE GYNECOLOGICAL EXAMINATION: Primary | ICD-10-CM

## 2025-02-24 PROCEDURE — G0476 HPV COMBO ASSAY CA SCREEN: HCPCS | Performed by: PHYSICIAN ASSISTANT

## 2025-02-24 PROCEDURE — S0612 ANNUAL GYNECOLOGICAL EXAMINA: HCPCS | Performed by: PHYSICIAN ASSISTANT

## 2025-02-24 PROCEDURE — G0145 SCR C/V CYTO,THINLAYER,RESCR: HCPCS | Performed by: PHYSICIAN ASSISTANT

## 2025-02-24 RX ORDER — ESTRADIOL 0.1 MG/G
1 CREAM VAGINAL 2 TIMES WEEKLY
Qty: 42.5 G | Refills: 1 | Status: SHIPPED | OUTPATIENT
Start: 2025-02-24

## 2025-02-24 NOTE — PROGRESS NOTES
ASSESSMENT & PLAN:   Diagnoses and all orders for this visit:    Routine gynecological examination  -     Liquid-based pap, screening; Future  -     Liquid-based pap, screening    Screening for malignant neoplasm of the cervix  -     Liquid-based pap, screening; Future  -     Liquid-based pap, screening    Screening for HPV (human papillomavirus)  -     Liquid-based pap, screening; Future  -     Liquid-based pap, screening    Postmenopausal atrophic vaginitis  -     estradiol (ESTRACE VAGINAL) 0.1 mg/g vaginal cream; Insert 1 g into the vagina 2 (two) times a week          The following were reviewed in today's visit: ASCCP guidelines, Gardisil vaccination, STD testing breast self exam, mammography screening ordered, STD testing, menopause, exercise, and healthy diet.    Patient to return to office in yearly for annual exam.     All questions have been answered to her satisfaction.        CC:  Annual Gynecologic Examination  Chief Complaint   Patient presents with    Gynecologic Exam     Patient presents for her yearly exam.  Neg pap/neg HPV 10/12/20, 23  Diag mammo 3/24/23 - scheduled 4/3/24  DXA - scheduled 3/20/24  colonoscopy 19 due in 10 years           HPI: Michelle Gutierrez is a 59 y.o.  who presents for annual gynecologic examination.  She has the following concerns:  none at this time. Doing well overall.       Health Maintenance:    Exercise: intermittently  Breast exams/breast awareness: yes  Last mammogram:   Colorectal cancer screenin    Past Medical History:   Diagnosis Date    Allergic rhinitis     Resolved 2017     Cervical neuritis     Contact lens/glasses fitting     COVID-19         Disease of thyroid gland     Dysphagia     mild stasis, dysmotility    Hashimoto's thyroiditis     Migraine 2020    Daily headaches    Miscarriage 1996    Primary localized osteoarthritis of right knee 2018    Sleep apnea     mild    Varicella     As a child    Vitamin D  deficiency        Past Surgical History:   Procedure Laterality Date     SECTION      COLONOSCOPY      DILATION AND CURETTAGE OF UTERUS      KNEE SURGERY Left     meniscus       Past OB/Gyn History:   No LMP recorded (lmp unknown). Patient is postmenopausal.    Menopausal status: postmenopausal  Menopausal symptoms: vaginal dryness    Last Pap:  : no abnormalities  History of abnormal Pap smear: no    Patient is not currently sexually active.   STD testing: no  Current contraception: abstinence      Family History  Family History   Problem Relation Age of Onset    Hypertension Mother     Hyperlipidemia Mother     Osteoarthritis Mother     Thyroid disease Mother     Hyperthyroidism Mother     Asthma Mother     Coronary artery disease Mother     Heart failure Mother          from heart failure after back surgery 10/21    Heart attack Father     Heart failure Father          from heart attack  at age 76    Heart disease Father          from heart attack at age 76    No Known Problems Sister     No Known Problems Daughter     No Known Problems Brother     Heart block Son     No Known Problems Maternal Aunt     No Known Problems Maternal Aunt     No Known Problems Maternal Aunt     No Known Problems Maternal Aunt     Arthritis Family     Osteoporosis Family        Family history of uterine or ovarian cancer: no  Family history of breast cancer: no  Family history of colon cancer: no    Social History:  Social History     Socioeconomic History    Marital status: /Civil Union     Spouse name: Not on file    Number of children: 2    Years of education: Not on file    Highest education level: Bachelor's degree (e.g., BA, AB, BS)   Occupational History    Occupation: RN, weekends    Tobacco Use    Smoking status: Never    Smokeless tobacco: Never   Vaping Use    Vaping status: Never Used   Substance and Sexual Activity    Alcohol use: No    Drug use: No    Sexual activity: Not  "Currently     Partners: Male     Birth control/protection: Post-menopausal, None     Comment: not sexually active and acceptable to both   Other Topics Concern    Not on file   Social History Narrative    Daily cola consumption (__cans/day)    Daily tea consumption (1 cup/day)    Does not exercise     x25 years     Social Drivers of Health     Financial Resource Strain: Not on file   Food Insecurity: Not on file   Transportation Needs: Not on file   Physical Activity: Not on file   Stress: Not on file   Social Connections: Not on file   Intimate Partner Violence: Not on file   Housing Stability: Not on file     Domestic violence screen: negative    Allergies:  No Known Allergies    Medications:    Current Outpatient Medications:     Calcium-Cholecalciferol-Zinc (Viactiv Calcium Immune) 650-20-5.5 MG-MCG-MG CHEW, , Disp: , Rfl:     cetirizine (ZyrTEC) 10 MG chewable tablet, Chew 10 mg daily, Disp: , Rfl:     Cholecalciferol 25 MCG (1000 UT) tablet, Take 1,000 Units by mouth daily, Disp: , Rfl:     estradiol (ESTRACE VAGINAL) 0.1 mg/g vaginal cream, Insert 1 g into the vagina 2 (two) times a week, Disp: 42.5 g, Rfl: 1    fluticasone (FLONASE) 50 mcg/act nasal spray, 1 spray into each nostril daily, Disp: , Rfl:     levothyroxine 75 mcg tablet, Take 1 tablet (75 mcg total) by mouth daily, Disp: 90 tablet, Rfl: 1    Review of Systems:  Review of Systems   Constitutional:  Negative for chills, fever and unexpected weight change.   Respiratory:  Negative for shortness of breath.    Cardiovascular:  Negative for chest pain.   Gastrointestinal:  Negative for abdominal pain, diarrhea, nausea and vomiting.   Skin:  Negative for rash.   Psychiatric/Behavioral:  Negative for dysphoric mood. The patient is not nervous/anxious.          Physical Exam:  /76 (BP Location: Left arm, Patient Position: Sitting, Cuff Size: Standard)   Ht 5' 6\" (1.676 m)   Wt 65.7 kg (144 lb 12.8 oz)   LMP  (LMP Unknown)   Breastfeeding " No   BMI 23.37 kg/m²    Physical Exam  Constitutional:       Appearance: Normal appearance.   Genitourinary:      Vulva and urethral meatus normal.      No lesions in the vagina.      Right Labia: No rash or lesions.     Left Labia: No lesions or rash.     No vaginal discharge, erythema or bleeding.      Severe vaginal atrophy present.       Right Adnexa: not tender and no mass present.     Left Adnexa: not tender and no mass present.     No cervical discharge or lesion.      Uterus is not tender.   Breasts:     Breasts are symmetrical.      Right: No mass or skin change.      Left: No mass or skin change.   HENT:      Head: Normocephalic and atraumatic.   Cardiovascular:      Rate and Rhythm: Normal rate and regular rhythm.      Heart sounds: Normal heart sounds. No murmur heard.     No friction rub. No gallop.   Pulmonary:      Effort: Pulmonary effort is normal.      Breath sounds: Normal breath sounds. No wheezing, rhonchi or rales.   Abdominal:      General: Abdomen is flat. There is no distension.      Palpations: Abdomen is soft.      Tenderness: There is no abdominal tenderness.   Musculoskeletal:      Cervical back: Neck supple.   Lymphadenopathy:      Upper Body:      Right upper body: No axillary adenopathy.      Left upper body: No axillary adenopathy.   Neurological:      General: No focal deficit present.      Mental Status: She is alert.   Skin:     General: Skin is warm and dry.   Psychiatric:         Mood and Affect: Mood normal.         Behavior: Behavior normal.   Vitals reviewed.

## 2025-02-26 ENCOUNTER — RESULTS FOLLOW-UP (OUTPATIENT)
Dept: OBGYN CLINIC | Facility: CLINIC | Age: 60
End: 2025-02-26

## 2025-02-28 LAB
LAB AP GYN PRIMARY INTERPRETATION: NORMAL
Lab: NORMAL

## 2025-04-08 ENCOUNTER — HOSPITAL ENCOUNTER (OUTPATIENT)
Dept: RADIOLOGY | Facility: HOSPITAL | Age: 60
Discharge: HOME | End: 2025-04-08
Attending: FAMILY MEDICINE
Payer: COMMERCIAL

## 2025-04-08 VITALS — HEIGHT: 66 IN | WEIGHT: 142 LBS | BODY MASS INDEX: 22.82 KG/M2

## 2025-04-08 DIAGNOSIS — Z12.31 ENCOUNTER FOR SCREENING MAMMOGRAM FOR BREAST CANCER: ICD-10-CM

## 2025-04-08 PROCEDURE — 77063 BREAST TOMOSYNTHESIS BI: CPT

## 2025-04-08 PROCEDURE — 77067 SCR MAMMO BI INCL CAD: CPT

## 2025-05-20 ENCOUNTER — OFFICE VISIT (OUTPATIENT)
Dept: CARDIOLOGY CLINIC | Facility: CLINIC | Age: 60
End: 2025-05-20
Payer: COMMERCIAL

## 2025-05-20 VITALS
WEIGHT: 145.5 LBS | BODY MASS INDEX: 23.48 KG/M2 | SYSTOLIC BLOOD PRESSURE: 102 MMHG | OXYGEN SATURATION: 98 % | HEART RATE: 79 BPM | DIASTOLIC BLOOD PRESSURE: 80 MMHG

## 2025-05-20 DIAGNOSIS — I49.3 PREMATURE VENTRICULAR CONTRACTION: ICD-10-CM

## 2025-05-20 DIAGNOSIS — R00.2 PALPITATIONS: Primary | ICD-10-CM

## 2025-05-20 PROCEDURE — 99214 OFFICE O/P EST MOD 30 MIN: CPT | Performed by: INTERNAL MEDICINE

## 2025-05-20 NOTE — PROGRESS NOTES
[facility] [place]  [address] [facility] [facility]    [facility] [address]  Phone#  [phone]  Fax#  [phone]  [facility]'s Cardiology Office Follow-up Visit             NAME: Michelle Gutierrez  AGE: 60 y.o. SEX: female   : 1965   MRN: [id number]    DATE: 2025  TIME: 10:12 AM    Cardiology Problem list:  No specialty comments available.    Assessment & Plan  Palpitations  Her symptoms are c/w PVCs. She will try some supplemental magnesium and we will check a holter.     Orders:    POCT ECG    Holter monitor; Future    Premature ventricular contraction    Orders:    Holter monitor; Future           HPI:    Michelle Gutierrez is a 60 y.o.-year-old female who presents to the cardiology clinic for follow up for the above-listed problems.     She has been having palpitations since January. She has palpitations daily and similar to her prior symptoms of pvcs but more frequent.     Past history, family history, social history, current medications, vital signs, recent lab and imaging studies and  prior cardiology studies reviewed independently on this visit.    Allergies[1]  Current Medications[2]    Review of Systems   Constitutional:  Negative for chills and fever.   HENT:  Negative for ear pain and sore throat.    Eyes:  Negative for pain and visual disturbance.   Respiratory:  Negative for cough and shortness of breath.    Cardiovascular:  Negative for chest pain and palpitations.   Gastrointestinal:  Negative for abdominal pain and vomiting.   Genitourinary:  Negative for dysuria and hematuria.   Musculoskeletal:  Negative for arthralgias and back pain.   Skin:  Negative for color change and rash.   Neurological:  Negative for seizures and syncope.   All other systems reviewed and are negative.      Objective:     Vitals:    25 0959   BP: 102/80   Pulse: 79   SpO2: 98%     Wt Readings from Last 3 Encounters:   25 66 kg (145 lb 8 oz)   25 64.4 kg (142 lb)   25 65.7 kg  (144 lb 12.8 oz)     Pulse Readings from Last 3 Encounters:   05/20/25 79   01/14/25 92   05/24/24 75     BP Readings from Last 3 Encounters:   05/20/25 102/80   02/24/25 112/76   01/14/25 120/80     Physical Exam  Vitals and nursing note reviewed.   Constitutional:       General: She is not in acute distress.     Appearance: She is well-developed.   HENT:      Head: Normocephalic and atraumatic.     Eyes:      Conjunctiva/sclera: Conjunctivae normal.       Cardiovascular:      Rate and Rhythm: Normal rate and regular rhythm.      Heart sounds: No murmur heard.  Pulmonary:      Effort: Pulmonary effort is normal. No respiratory distress.      Breath sounds: Normal breath sounds.   Abdominal:      Palpations: Abdomen is soft.      Tenderness: There is no abdominal tenderness.     Musculoskeletal:         General: No swelling.      Cervical back: Neck supple.     Skin:     General: Skin is warm and dry.      Capillary Refill: Capillary refill takes less than 2 seconds.     Neurological:      Mental Status: She is alert.     Psychiatric:         Mood and Affect: Mood normal.         Pertinent Laboratory/Diagnostic Studies:    Laboratory studies reviewed personally by Filipe Rodriguez MD    BMP:   Lab Results   Component Value Date    SODIUM 139 08/09/2024    K 4.2 08/09/2024     08/09/2024    CO2 26 08/09/2024    BUN 9 08/09/2024    CREATININE 0.54 08/09/2024    GLUC 77 08/09/2024    CALCIUM 9.2 08/09/2024     CBC:  Lab Results   Component Value Date    WBC 7.2 08/09/2024    HGB 14.4 08/09/2024    HCT 42.5 08/09/2024    MCV 92.4 08/09/2024     08/09/2024     Lipid Profile:   Lab Results   Component Value Date    HDL 53 08/09/2024     Lab Results   Component Value Date    LDLCALC 76 08/09/2024     Lab Results   Component Value Date    TRIG 60 08/09/2024      Other labs:  Lab Results   Component Value Date    YWZ0DFJBDQNZ 2.205 01/30/2024     Lab Results   Component Value Date    ALT 11 08/09/2024    AST 17  "08/09/2024     Lab Results   Component Value Date    HGBA1C 5.3 03/28/2022         Imaging Studies:     Pertinent imaging studies and cardiac studies were independently reviewed on this visit and findings summarized.    I have spent a total time of 25  minutes in caring for this patient on the day of the visit/encounter including reviewing and entering of medical history, physical examination, diagnostic results, instructions for management, patient education, risk factor reduction, documenting in the medical record, sending communications to other providers, reviewing/ordering tests, medicine, procedures etc.    Filipe Rodriguez MD, [facility]    Portions of the record may have been created with voice recognition software.  Occasional wrong word or \"sound alike\" substitutions may have occurred due to the inherent limitations of voice recognition software.  Read the chart carefully and recognize, using context, where substitutions have occurred. Please reach out to me directly for any clarifications.         [1] No Known Allergies  [2]   Current Outpatient Medications:     Calcium-Cholecalciferol-Zinc (Viactiv Calcium Immune) 650-20-5.5 MG-MCG-MG CHEW, , Disp: , Rfl:     cetirizine (ZyrTEC) 10 MG chewable tablet, Chew 10 mg in the morning., Disp: , Rfl:     Cholecalciferol 25 MCG (1000 UT) tablet, Take 1,000 Units by mouth in the morning., Disp: , Rfl:     estradiol (ESTRACE VAGINAL) 0.1 mg/g vaginal cream, Insert 1 g into the vagina 2 (two) times a week, Disp: 42.5 g, Rfl: 1    fluticasone (FLONASE) 50 mcg/act nasal spray, 1 spray into each nostril in the morning., Disp: , Rfl:     levothyroxine 75 mcg tablet, Take 1 tablet (75 mcg total) by mouth daily, Disp: 90 tablet, Rfl: 1    "

## 2025-05-20 NOTE — ASSESSMENT & PLAN NOTE
Her symptoms are c/w PVCs. She will try some supplemental magnesium and we will check a holter.     Orders:    POCT ECG    Holter monitor; Future

## 2025-06-18 ENCOUNTER — HOSPITAL ENCOUNTER (OUTPATIENT)
Dept: NON INVASIVE DIAGNOSTICS | Facility: CLINIC | Age: 60
Discharge: HOME | End: 2025-06-18
Attending: INTERNAL MEDICINE
Payer: COMMERCIAL

## 2025-06-18 DIAGNOSIS — I49.3 PREMATURE VENTRICULAR CONTRACTION: ICD-10-CM

## 2025-06-18 DIAGNOSIS — R00.2 PALPITATIONS: ICD-10-CM

## 2025-06-18 PROCEDURE — 93225 XTRNL ECG REC<48 HRS REC: CPT

## 2025-06-18 PROCEDURE — 93226 XTRNL ECG REC<48 HR SCAN A/R: CPT

## 2025-06-23 PROCEDURE — 93227 XTRNL ECG REC<48 HR R&I: CPT | Performed by: INTERNAL MEDICINE
